# Patient Record
Sex: FEMALE | Race: OTHER | Employment: UNEMPLOYED
[De-identification: names, ages, dates, MRNs, and addresses within clinical notes are randomized per-mention and may not be internally consistent; named-entity substitution may affect disease eponyms.]

---

## 2017-01-30 ENCOUNTER — OFFICE VISIT (OUTPATIENT)
Dept: FAMILY MEDICINE CLINIC | Facility: CLINIC | Age: 37
End: 2017-01-30

## 2017-01-30 VITALS
HEIGHT: 67 IN | BODY MASS INDEX: 34.78 KG/M2 | WEIGHT: 221.6 LBS | SYSTOLIC BLOOD PRESSURE: 107 MMHG | HEART RATE: 65 BPM | DIASTOLIC BLOOD PRESSURE: 74 MMHG

## 2017-01-30 DIAGNOSIS — E66.9 OBESITY (BMI 30.0-34.9): Primary | ICD-10-CM

## 2017-01-30 DIAGNOSIS — H93.11 TINNITUS, RIGHT: ICD-10-CM

## 2017-01-30 DIAGNOSIS — D23.72 BENIGN NEOPLASM OF SKIN OF LEFT LOWER EXTREMITY: ICD-10-CM

## 2017-01-30 PROCEDURE — 99213 OFFICE O/P EST LOW 20 MIN: CPT | Performed by: FAMILY MEDICINE

## 2017-01-30 RX ORDER — PHENTERMINE HYDROCHLORIDE 37.5 MG/1
37.5 TABLET ORAL
Qty: 30 TABLET | Refills: 0 | Status: SHIPPED | OUTPATIENT
Start: 2017-01-30 | End: 2017-03-01

## 2017-02-23 ENCOUNTER — ANESTHESIA EVENT (OUTPATIENT)
Dept: OPERATING ROOM | Age: 37
End: 2017-02-23
Payer: MEDICARE

## 2017-02-24 ENCOUNTER — HOSPITAL ENCOUNTER (OUTPATIENT)
Age: 37
Setting detail: OUTPATIENT SURGERY
Discharge: HOME | End: 2017-02-24
Attending: SURGERY | Admitting: SURGERY
Payer: MEDICARE

## 2017-02-24 ENCOUNTER — SURGERY (OUTPATIENT)
Age: 37
End: 2017-02-24

## 2017-02-24 ENCOUNTER — ANESTHESIA (OUTPATIENT)
Dept: OPERATING ROOM | Age: 37
End: 2017-02-24
Payer: MEDICARE

## 2017-02-24 VITALS
WEIGHT: 220 LBS | TEMPERATURE: 98.1 F | RESPIRATION RATE: 16 BRPM | SYSTOLIC BLOOD PRESSURE: 107 MMHG | BODY MASS INDEX: 35.36 KG/M2 | DIASTOLIC BLOOD PRESSURE: 74 MMHG | OXYGEN SATURATION: 98 % | HEIGHT: 66 IN | HEART RATE: 63 BPM

## 2017-02-24 VITALS — SYSTOLIC BLOOD PRESSURE: 97 MMHG | DIASTOLIC BLOOD PRESSURE: 43 MMHG | OXYGEN SATURATION: 99 %

## 2017-02-24 LAB
ANION GAP SERPL CALCULATED.3IONS-SCNC: 16 MMOL/L (ref 9–17)
BUN BLDV-MCNC: 11 MG/DL (ref 6–20)
BUN/CREAT BLD: NORMAL (ref 9–20)
CALCIUM SERPL-MCNC: 8.7 MG/DL (ref 8.6–10.4)
CHLORIDE BLD-SCNC: 105 MMOL/L (ref 98–107)
CO2: 20 MMOL/L (ref 20–31)
CREAT SERPL-MCNC: 0.61 MG/DL (ref 0.5–0.9)
GFR AFRICAN AMERICAN: >60 ML/MIN
GFR NON-AFRICAN AMERICAN: >60 ML/MIN
GFR SERPL CREATININE-BSD FRML MDRD: NORMAL ML/MIN/{1.73_M2}
GFR SERPL CREATININE-BSD FRML MDRD: NORMAL ML/MIN/{1.73_M2}
GLUCOSE BLD-MCNC: 77 MG/DL (ref 70–99)
HCG, PREGNANCY URINE (POC): NEGATIVE
POTASSIUM SERPL-SCNC: 3.9 MMOL/L (ref 3.7–5.3)
SODIUM BLD-SCNC: 141 MMOL/L (ref 135–144)

## 2017-02-24 PROCEDURE — 84703 CHORIONIC GONADOTROPIN ASSAY: CPT

## 2017-02-24 PROCEDURE — 3600000002 HC SURGERY LEVEL 2 BASE: Performed by: SURGERY

## 2017-02-24 PROCEDURE — 2500000003 HC RX 250 WO HCPCS: Performed by: SPECIALIST

## 2017-02-24 PROCEDURE — 7100000010 HC PHASE II RECOVERY - FIRST 15 MIN: Performed by: SURGERY

## 2017-02-24 PROCEDURE — 2580000003 HC RX 258: Performed by: ANESTHESIOLOGY

## 2017-02-24 PROCEDURE — 7100000011 HC PHASE II RECOVERY - ADDTL 15 MIN: Performed by: SURGERY

## 2017-02-24 PROCEDURE — 88304 TISSUE EXAM BY PATHOLOGIST: CPT

## 2017-02-24 PROCEDURE — 6360000002 HC RX W HCPCS: Performed by: SURGERY

## 2017-02-24 PROCEDURE — 3700000000 HC ANESTHESIA ATTENDED CARE: Performed by: SURGERY

## 2017-02-24 PROCEDURE — 2500000003 HC RX 250 WO HCPCS: Performed by: SURGERY

## 2017-02-24 PROCEDURE — 80048 BASIC METABOLIC PNL TOTAL CA: CPT

## 2017-02-24 PROCEDURE — 3700000001 HC ADD 15 MINUTES (ANESTHESIA): Performed by: SURGERY

## 2017-02-24 PROCEDURE — 6370000000 HC RX 637 (ALT 250 FOR IP): Performed by: ANESTHESIOLOGY

## 2017-02-24 PROCEDURE — 6360000002 HC RX W HCPCS: Performed by: SPECIALIST

## 2017-02-24 PROCEDURE — 3600000012 HC SURGERY LEVEL 2 ADDTL 15MIN: Performed by: SURGERY

## 2017-02-24 RX ORDER — BUPIVACAINE HYDROCHLORIDE AND EPINEPHRINE 5; 5 MG/ML; UG/ML
INJECTION, SOLUTION PERINEURAL PRN
Status: DISCONTINUED | OUTPATIENT
Start: 2017-02-24 | End: 2017-02-24 | Stop reason: HOSPADM

## 2017-02-24 RX ORDER — OXYCODONE HYDROCHLORIDE AND ACETAMINOPHEN 5; 325 MG/1; MG/1
2 TABLET ORAL ONCE
Status: COMPLETED | OUTPATIENT
Start: 2017-02-24 | End: 2017-02-24

## 2017-02-24 RX ORDER — ONDANSETRON 2 MG/ML
4 INJECTION INTRAMUSCULAR; INTRAVENOUS
Status: DISCONTINUED | OUTPATIENT
Start: 2017-02-24 | End: 2017-02-24 | Stop reason: HOSPADM

## 2017-02-24 RX ORDER — MIDAZOLAM HYDROCHLORIDE 1 MG/ML
INJECTION INTRAMUSCULAR; INTRAVENOUS PRN
Status: DISCONTINUED | OUTPATIENT
Start: 2017-02-24 | End: 2017-02-24 | Stop reason: SDUPTHER

## 2017-02-24 RX ORDER — OXYCODONE HYDROCHLORIDE AND ACETAMINOPHEN 5; 325 MG/1; MG/1
1 TABLET ORAL EVERY 6 HOURS PRN
Qty: 20 TABLET | Refills: 0 | Status: SHIPPED | OUTPATIENT
Start: 2017-02-24 | End: 2017-03-03

## 2017-02-24 RX ORDER — OXYCODONE HYDROCHLORIDE AND ACETAMINOPHEN 5; 325 MG/1; MG/1
1 TABLET ORAL ONCE
Status: COMPLETED | OUTPATIENT
Start: 2017-02-24 | End: 2017-02-24

## 2017-02-24 RX ORDER — FENTANYL CITRATE 50 UG/ML
INJECTION, SOLUTION INTRAMUSCULAR; INTRAVENOUS PRN
Status: DISCONTINUED | OUTPATIENT
Start: 2017-02-24 | End: 2017-02-24 | Stop reason: SDUPTHER

## 2017-02-24 RX ORDER — DIPHENHYDRAMINE HYDROCHLORIDE 50 MG/ML
12.5 INJECTION INTRAMUSCULAR; INTRAVENOUS
Status: DISCONTINUED | OUTPATIENT
Start: 2017-02-24 | End: 2017-02-24 | Stop reason: HOSPADM

## 2017-02-24 RX ORDER — PROPOFOL 10 MG/ML
INJECTION, EMULSION INTRAVENOUS PRN
Status: DISCONTINUED | OUTPATIENT
Start: 2017-02-24 | End: 2017-02-24 | Stop reason: SDUPTHER

## 2017-02-24 RX ORDER — LIDOCAINE HYDROCHLORIDE 10 MG/ML
INJECTION, SOLUTION EPIDURAL; INFILTRATION; INTRACAUDAL; PERINEURAL PRN
Status: DISCONTINUED | OUTPATIENT
Start: 2017-02-24 | End: 2017-02-24 | Stop reason: SDUPTHER

## 2017-02-24 RX ORDER — PROMETHAZINE HYDROCHLORIDE 25 MG/ML
12.5 INJECTION, SOLUTION INTRAMUSCULAR; INTRAVENOUS
Status: DISCONTINUED | OUTPATIENT
Start: 2017-02-24 | End: 2017-02-24 | Stop reason: HOSPADM

## 2017-02-24 RX ORDER — SODIUM CHLORIDE, SODIUM LACTATE, POTASSIUM CHLORIDE, CALCIUM CHLORIDE 600; 310; 30; 20 MG/100ML; MG/100ML; MG/100ML; MG/100ML
INJECTION, SOLUTION INTRAVENOUS CONTINUOUS
Status: DISCONTINUED | OUTPATIENT
Start: 2017-02-24 | End: 2017-02-24 | Stop reason: HOSPADM

## 2017-02-24 RX ORDER — DOCUSATE SODIUM 100 MG/1
100 CAPSULE, LIQUID FILLED ORAL 2 TIMES DAILY
Qty: 60 CAPSULE | Refills: 0 | Status: SHIPPED | OUTPATIENT
Start: 2017-02-24 | End: 2017-03-15 | Stop reason: ALTCHOICE

## 2017-02-24 RX ORDER — LIDOCAINE HYDROCHLORIDE 10 MG/ML
1 INJECTION, SOLUTION EPIDURAL; INFILTRATION; INTRACAUDAL; PERINEURAL
Status: DISCONTINUED | OUTPATIENT
Start: 2017-02-24 | End: 2017-02-24 | Stop reason: HOSPADM

## 2017-02-24 RX ADMIN — PROPOFOL 100 MG: 10 INJECTION, EMULSION INTRAVENOUS at 14:35

## 2017-02-24 RX ADMIN — PROPOFOL 50 MG: 10 INJECTION, EMULSION INTRAVENOUS at 14:55

## 2017-02-24 RX ADMIN — PROPOFOL 100 MG: 10 INJECTION, EMULSION INTRAVENOUS at 14:40

## 2017-02-24 RX ADMIN — FENTANYL CITRATE 100 MCG: 50 INJECTION, SOLUTION INTRAMUSCULAR; INTRAVENOUS at 14:30

## 2017-02-24 RX ADMIN — MIDAZOLAM HYDROCHLORIDE 2 MG: 1 INJECTION, SOLUTION INTRAMUSCULAR; INTRAVENOUS at 14:30

## 2017-02-24 RX ADMIN — BUPIVACAINE HYDROCHLORIDE AND EPINEPHRINE BITARTRATE 10 ML: 5; .005 INJECTION, SOLUTION PERINEURAL at 15:02

## 2017-02-24 RX ADMIN — Medication 2 G: at 14:39

## 2017-02-24 RX ADMIN — LIDOCAINE HYDROCHLORIDE 50 MG: 10 INJECTION, SOLUTION EPIDURAL; INFILTRATION; INTRACAUDAL; PERINEURAL at 14:30

## 2017-02-24 RX ADMIN — PROPOFOL 100 MG: 10 INJECTION, EMULSION INTRAVENOUS at 14:45

## 2017-02-24 RX ADMIN — OXYCODONE HYDROCHLORIDE AND ACETAMINOPHEN 2 TABLET: 5; 325 TABLET ORAL at 15:55

## 2017-02-24 RX ADMIN — PROPOFOL 100 MG: 10 INJECTION, EMULSION INTRAVENOUS at 14:50

## 2017-02-24 RX ADMIN — SODIUM CHLORIDE, POTASSIUM CHLORIDE, SODIUM LACTATE AND CALCIUM CHLORIDE: 600; 310; 30; 20 INJECTION, SOLUTION INTRAVENOUS at 13:18

## 2017-02-24 ASSESSMENT — PAIN DESCRIPTION - LOCATION: LOCATION: LEG

## 2017-02-24 ASSESSMENT — PAIN SCALES - GENERAL
PAINLEVEL_OUTOF10: 7
PAINLEVEL_OUTOF10: 2
PAINLEVEL_OUTOF10: 3
PAINLEVEL_OUTOF10: 0
PAINLEVEL_OUTOF10: 3

## 2017-02-24 ASSESSMENT — PAIN - FUNCTIONAL ASSESSMENT: PAIN_FUNCTIONAL_ASSESSMENT: 0-10

## 2017-02-24 ASSESSMENT — PAIN DESCRIPTION - PAIN TYPE: TYPE: SURGICAL PAIN

## 2017-02-24 ASSESSMENT — ENCOUNTER SYMPTOMS: SHORTNESS OF BREATH: 1

## 2017-02-24 ASSESSMENT — PAIN DESCRIPTION - ORIENTATION: ORIENTATION: LEFT

## 2017-02-28 LAB — DERMATOLOGY PATHOLOGY REPORT: NORMAL

## 2017-03-13 ENCOUNTER — HOSPITAL ENCOUNTER (EMERGENCY)
Age: 37
Discharge: HOME | End: 2017-03-13
Attending: EMERGENCY MEDICINE
Payer: MEDICARE

## 2017-03-13 ENCOUNTER — APPOINTMENT (OUTPATIENT)
Dept: GENERAL RADIOLOGY | Age: 37
End: 2017-03-13
Payer: MEDICARE

## 2017-03-13 VITALS
DIASTOLIC BLOOD PRESSURE: 73 MMHG | TEMPERATURE: 99.9 F | HEART RATE: 89 BPM | BODY MASS INDEX: 35.51 KG/M2 | RESPIRATION RATE: 18 BRPM | SYSTOLIC BLOOD PRESSURE: 125 MMHG | OXYGEN SATURATION: 98 % | WEIGHT: 220 LBS

## 2017-03-13 DIAGNOSIS — S52.202A CLOSED FRACTURE OF SHAFT OF LEFT ULNA, UNSPECIFIED FRACTURE MORPHOLOGY, INITIAL ENCOUNTER: Primary | ICD-10-CM

## 2017-03-13 PROCEDURE — 99284 EMERGENCY DEPT VISIT MOD MDM: CPT

## 2017-03-13 PROCEDURE — 73090 X-RAY EXAM OF FOREARM: CPT

## 2017-03-13 PROCEDURE — 6360000002 HC RX W HCPCS: Performed by: INTERNAL MEDICINE

## 2017-03-13 PROCEDURE — 73590 X-RAY EXAM OF LOWER LEG: CPT

## 2017-03-13 PROCEDURE — 73070 X-RAY EXAM OF ELBOW: CPT

## 2017-03-13 PROCEDURE — 96374 THER/PROPH/DIAG INJ IV PUSH: CPT

## 2017-03-13 PROCEDURE — 96376 TX/PRO/DX INJ SAME DRUG ADON: CPT

## 2017-03-13 PROCEDURE — 73502 X-RAY EXAM HIP UNI 2-3 VIEWS: CPT

## 2017-03-13 PROCEDURE — 96375 TX/PRO/DX INJ NEW DRUG ADDON: CPT

## 2017-03-13 PROCEDURE — 29125 APPL SHORT ARM SPLINT STATIC: CPT

## 2017-03-13 PROCEDURE — 72072 X-RAY EXAM THORAC SPINE 3VWS: CPT

## 2017-03-13 PROCEDURE — 73110 X-RAY EXAM OF WRIST: CPT

## 2017-03-13 PROCEDURE — 73552 X-RAY EXAM OF FEMUR 2/>: CPT

## 2017-03-13 RX ORDER — MORPHINE SULFATE 4 MG/ML
4 INJECTION, SOLUTION INTRAMUSCULAR; INTRAVENOUS
Status: DISCONTINUED | OUTPATIENT
Start: 2017-03-13 | End: 2017-03-13

## 2017-03-13 RX ORDER — OXYCODONE HYDROCHLORIDE AND ACETAMINOPHEN 5; 325 MG/1; MG/1
1 TABLET ORAL EVERY 6 HOURS PRN
Qty: 20 TABLET | Refills: 0 | Status: SHIPPED | OUTPATIENT
Start: 2017-03-13 | End: 2017-03-18

## 2017-03-13 RX ORDER — KETOROLAC TROMETHAMINE 15 MG/ML
30 INJECTION, SOLUTION INTRAMUSCULAR; INTRAVENOUS ONCE
Status: COMPLETED | OUTPATIENT
Start: 2017-03-13 | End: 2017-03-13

## 2017-03-13 RX ADMIN — MORPHINE SULFATE 4 MG: 4 INJECTION, SOLUTION INTRAMUSCULAR; INTRAVENOUS at 18:46

## 2017-03-13 RX ADMIN — HYDROMORPHONE HYDROCHLORIDE 0.5 MG: 1 INJECTION, SOLUTION INTRAMUSCULAR; INTRAVENOUS; SUBCUTANEOUS at 21:43

## 2017-03-13 RX ADMIN — HYDROMORPHONE HYDROCHLORIDE 1 MG: 1 INJECTION, SOLUTION INTRAMUSCULAR; INTRAVENOUS; SUBCUTANEOUS at 20:41

## 2017-03-13 RX ADMIN — KETOROLAC TROMETHAMINE 30 MG: 15 INJECTION, SOLUTION INTRAMUSCULAR; INTRAVENOUS at 22:47

## 2017-03-13 ASSESSMENT — PAIN SCALES - GENERAL
PAINLEVEL_OUTOF10: 10
PAINLEVEL_OUTOF10: 10
PAINLEVEL_OUTOF10: 9
PAINLEVEL_OUTOF10: 10
PAINLEVEL_OUTOF10: 9

## 2017-03-13 ASSESSMENT — ENCOUNTER SYMPTOMS
EYE DISCHARGE: 0
SHORTNESS OF BREATH: 0
WHEEZING: 0
ANAL BLEEDING: 0
BACK PAIN: 1
ABDOMINAL DISTENTION: 0
CHEST TIGHTNESS: 0
EYE PAIN: 0

## 2017-03-13 ASSESSMENT — PAIN DESCRIPTION - ORIENTATION: ORIENTATION: LEFT

## 2017-03-13 ASSESSMENT — PAIN DESCRIPTION - DESCRIPTORS: DESCRIPTORS: SHARP

## 2017-03-13 ASSESSMENT — PAIN DESCRIPTION - LOCATION: LOCATION: ARM

## 2017-03-13 ASSESSMENT — PAIN SCALES - WONG BAKER: WONGBAKER_NUMERICALRESPONSE: 10

## 2017-03-13 ASSESSMENT — PAIN DESCRIPTION - PAIN TYPE: TYPE: ACUTE PAIN

## 2017-03-15 ENCOUNTER — HOSPITAL ENCOUNTER (OUTPATIENT)
Age: 37
Setting detail: SPECIMEN
Discharge: HOME | End: 2017-03-15
Payer: MEDICARE

## 2017-03-15 ENCOUNTER — OFFICE VISIT (OUTPATIENT)
Dept: FAMILY MEDICINE CLINIC | Age: 37
End: 2017-03-15
Payer: MEDICARE

## 2017-03-15 VITALS
SYSTOLIC BLOOD PRESSURE: 107 MMHG | HEIGHT: 66 IN | DIASTOLIC BLOOD PRESSURE: 74 MMHG | OXYGEN SATURATION: 99 % | TEMPERATURE: 97 F | HEART RATE: 64 BPM | RESPIRATION RATE: 12 BRPM

## 2017-03-15 DIAGNOSIS — R30.0 DYSURIA: ICD-10-CM
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
## 2017-03-15 LAB
BILIRUBIN URINE: NEGATIVE
BILIRUBIN, POC: NEGATIVE
BLOOD URINE, POC: NEGATIVE
CLARITY, POC: CLEAR
COLOR, POC: YELLOW
COLOR: YELLOW
COMMENT UA: NORMAL
GLUCOSE URINE, POC: NEGATIVE
GLUCOSE URINE: NEGATIVE
KETONES, POC: NEGATIVE
KETONES, URINE: NEGATIVE
LEUKOCYTE EST, POC: ABNORMAL
LEUKOCYTE ESTERASE, URINE: NEGATIVE
NITRITE, POC: NEGATIVE
NITRITE, URINE: NEGATIVE
PH UA: 6 (ref 5–8)
PH, POC: 5
PROTEIN UA: NEGATIVE
PROTEIN, POC: ABNORMAL
SPECIFIC GRAVITY UA: 1.02 (ref 1–1.03)
SPECIFIC GRAVITY, POC: 1.02
TURBIDITY: CLEAR
URINE HGB: NEGATIVE
UROBILINOGEN, POC: NEGATIVE
UROBILINOGEN, URINE: NORMAL

## 2017-03-15 PROCEDURE — 99214 OFFICE O/P EST MOD 30 MIN: CPT | Performed by: FAMILY MEDICINE

## 2017-03-15 PROCEDURE — 81002 URINALYSIS NONAUTO W/O SCOPE: CPT | Performed by: FAMILY MEDICINE

## 2017-03-15 RX ORDER — NAPROXEN 500 MG/1
500 TABLET ORAL 2 TIMES DAILY WITH MEALS
Qty: 60 TABLET | Refills: 3 | Status: SHIPPED | OUTPATIENT
Start: 2017-03-15 | End: 2017-10-16 | Stop reason: ALTCHOICE

## 2017-03-15 RX ORDER — CRUTCH
1 EACH MISCELLANEOUS DAILY PRN
Qty: 1 EACH | Refills: 0 | Status: SHIPPED | OUTPATIENT
Start: 2017-03-15 | End: 2017-04-24 | Stop reason: ALTCHOICE

## 2017-03-15 RX ORDER — TIZANIDINE 4 MG/1
4 TABLET ORAL 3 TIMES DAILY
Qty: 90 TABLET | Refills: 0 | Status: SHIPPED | OUTPATIENT
Start: 2017-03-15 | End: 2017-04-24 | Stop reason: ALTCHOICE

## 2017-03-16 ENCOUNTER — TELEPHONE (OUTPATIENT)
Dept: FAMILY MEDICINE CLINIC | Age: 37
End: 2017-03-16

## 2017-03-16 LAB
CULTURE: NORMAL
CULTURE: NORMAL
Lab: NORMAL
SPECIMEN DESCRIPTION: NORMAL
STATUS: NORMAL

## 2017-03-16 RX ORDER — HYDROCODONE BITARTRATE AND ACETAMINOPHEN 7.5; 325 MG/1; MG/1
1 TABLET ORAL 2 TIMES DAILY
Qty: 30 TABLET | Refills: 0 | Status: SHIPPED | OUTPATIENT
Start: 2017-03-16 | End: 2017-03-31

## 2017-03-22 ENCOUNTER — OFFICE VISIT (OUTPATIENT)
Dept: FAMILY MEDICINE CLINIC | Age: 37
End: 2017-03-22
Payer: MEDICARE

## 2017-03-22 ENCOUNTER — OFFICE VISIT (OUTPATIENT)
Dept: ORTHOPEDIC SURGERY | Age: 37
End: 2017-03-22
Payer: MEDICARE

## 2017-03-22 VITALS — HEIGHT: 66 IN | BODY MASS INDEX: 36.48 KG/M2 | WEIGHT: 227 LBS

## 2017-03-22 VITALS
RESPIRATION RATE: 12 BRPM | HEIGHT: 66 IN | BODY MASS INDEX: 36.48 KG/M2 | HEART RATE: 79 BPM | DIASTOLIC BLOOD PRESSURE: 80 MMHG | OXYGEN SATURATION: 97 % | SYSTOLIC BLOOD PRESSURE: 112 MMHG | WEIGHT: 227 LBS

## 2017-03-22 DIAGNOSIS — F43.29 STRESS AND ADJUSTMENT REACTION: Primary | ICD-10-CM

## 2017-03-22 DIAGNOSIS — S52.235D CLOSED NONDISPLACED OBLIQUE FRACTURE OF SHAFT OF LEFT ULNA WITH ROUTINE HEALING, SUBSEQUENT ENCOUNTER: Primary | ICD-10-CM

## 2017-03-22 PROBLEM — S52.235A CLOSED NONDISPLACED OBLIQUE FRACTURE OF SHAFT OF LEFT ULNA: Status: ACTIVE | Noted: 2017-03-22

## 2017-03-22 PROCEDURE — 99213 OFFICE O/P EST LOW 20 MIN: CPT | Performed by: STUDENT IN AN ORGANIZED HEALTH CARE EDUCATION/TRAINING PROGRAM

## 2017-03-22 PROCEDURE — 99214 OFFICE O/P EST MOD 30 MIN: CPT | Performed by: FAMILY MEDICINE

## 2017-03-22 RX ORDER — CLONAZEPAM 0.5 MG/1
0.5 TABLET ORAL 2 TIMES DAILY
Qty: 28 TABLET | Refills: 0 | Status: SHIPPED | OUTPATIENT
Start: 2017-03-22 | End: 2017-10-16 | Stop reason: ALTCHOICE

## 2017-04-03 ENCOUNTER — OFFICE VISIT (OUTPATIENT)
Dept: ORTHOPEDIC SURGERY | Age: 37
End: 2017-04-03
Payer: MEDICARE

## 2017-04-03 VITALS — HEIGHT: 66 IN | WEIGHT: 227.07 LBS | BODY MASS INDEX: 36.49 KG/M2

## 2017-04-03 DIAGNOSIS — S52.235D CLOSED NONDISPLACED OBLIQUE FRACTURE OF SHAFT OF LEFT ULNA WITH ROUTINE HEALING, SUBSEQUENT ENCOUNTER: Primary | ICD-10-CM

## 2017-04-03 DIAGNOSIS — S52.235A CLOSED NONDISPLACED OBLIQUE FRACTURE OF SHAFT OF LEFT ULNA, INITIAL ENCOUNTER: Primary | ICD-10-CM

## 2017-04-03 PROBLEM — S52.236D: Status: ACTIVE | Noted: 2017-04-03

## 2017-04-03 PROCEDURE — 99213 OFFICE O/P EST LOW 20 MIN: CPT | Performed by: ORTHOPAEDIC SURGERY

## 2017-04-03 ASSESSMENT — ENCOUNTER SYMPTOMS
COUGH: 0
NAUSEA: 0
EYE PAIN: 0
VOMITING: 0
WHEEZING: 0
ABDOMINAL DISTENTION: 0
ABDOMINAL PAIN: 0
COLOR CHANGE: 0
SHORTNESS OF BREATH: 0

## 2017-04-24 ENCOUNTER — OFFICE VISIT (OUTPATIENT)
Dept: ORTHOPEDIC SURGERY | Age: 37
End: 2017-04-24
Payer: MEDICARE

## 2017-04-24 VITALS — HEIGHT: 66 IN | BODY MASS INDEX: 33.75 KG/M2 | WEIGHT: 210 LBS

## 2017-04-24 DIAGNOSIS — S52.235A CLOSED NONDISPLACED OBLIQUE FRACTURE OF SHAFT OF LEFT ULNA, INITIAL ENCOUNTER: Primary | ICD-10-CM

## 2017-04-24 PROCEDURE — 99213 OFFICE O/P EST LOW 20 MIN: CPT | Performed by: STUDENT IN AN ORGANIZED HEALTH CARE EDUCATION/TRAINING PROGRAM

## 2017-04-24 RX ORDER — PHENTERMINE HYDROCHLORIDE 37.5 MG/1
37.5 TABLET ORAL DAILY
COMMUNITY
Start: 2017-02-03 | End: 2017-10-16 | Stop reason: ALTCHOICE

## 2017-04-24 ASSESSMENT — ENCOUNTER SYMPTOMS
SHORTNESS OF BREATH: 0
ABDOMINAL DISTENTION: 0
WHEEZING: 0

## 2017-04-27 ENCOUNTER — OFFICE VISIT (OUTPATIENT)
Dept: FAMILY MEDICINE CLINIC | Age: 37
End: 2017-04-27
Payer: MEDICARE

## 2017-04-27 VITALS
HEART RATE: 82 BPM | TEMPERATURE: 97.8 F | WEIGHT: 220 LBS | SYSTOLIC BLOOD PRESSURE: 130 MMHG | BODY MASS INDEX: 34.53 KG/M2 | HEIGHT: 67 IN | DIASTOLIC BLOOD PRESSURE: 84 MMHG | OXYGEN SATURATION: 97 %

## 2017-04-27 DIAGNOSIS — E66.9 OBESITY (BMI 30.0-34.9): Primary | ICD-10-CM

## 2017-04-27 PROCEDURE — 99213 OFFICE O/P EST LOW 20 MIN: CPT | Performed by: FAMILY MEDICINE

## 2017-04-27 RX ORDER — DOCUSATE SODIUM 100 MG/1
CAPSULE ORAL
COMMUNITY
Start: 2017-02-24 | End: 2017-07-10 | Stop reason: ALTCHOICE

## 2017-04-27 RX ORDER — OXYCODONE HYDROCHLORIDE AND ACETAMINOPHEN 5; 325 MG/1; MG/1
TABLET ORAL
COMMUNITY
Start: 2017-03-14 | End: 2017-05-22 | Stop reason: ALTCHOICE

## 2017-04-27 RX ORDER — HYDROCODONE BITARTRATE AND ACETAMINOPHEN 7.5; 325 MG/1; MG/1
TABLET ORAL
COMMUNITY
Start: 2017-03-16 | End: 2017-05-22 | Stop reason: ALTCHOICE

## 2017-05-18 DIAGNOSIS — S52.235A CLOSED NONDISPLACED OBLIQUE FRACTURE OF SHAFT OF LEFT ULNA, INITIAL ENCOUNTER: Primary | ICD-10-CM

## 2017-05-22 ENCOUNTER — OFFICE VISIT (OUTPATIENT)
Dept: ORTHOPEDIC SURGERY | Age: 37
End: 2017-05-22
Payer: MEDICARE

## 2017-05-22 VITALS — WEIGHT: 218.7 LBS | BODY MASS INDEX: 34.33 KG/M2 | HEIGHT: 67 IN

## 2017-05-22 DIAGNOSIS — S52.235D CLOSED NONDISPLACED OBLIQUE FRACTURE OF SHAFT OF LEFT ULNA WITH ROUTINE HEALING, SUBSEQUENT ENCOUNTER: Primary | ICD-10-CM

## 2017-05-22 PROCEDURE — 99213 OFFICE O/P EST LOW 20 MIN: CPT | Performed by: ORTHOPAEDIC SURGERY

## 2017-05-22 ASSESSMENT — ENCOUNTER SYMPTOMS
VOMITING: 0
NAUSEA: 0
SHORTNESS OF BREATH: 0

## 2017-06-01 ENCOUNTER — HOSPITAL ENCOUNTER (OUTPATIENT)
Dept: PHYSICAL THERAPY | Age: 37
Setting detail: THERAPIES SERIES
Discharge: HOME | End: 2017-06-01
Payer: MEDICARE

## 2017-06-07 ENCOUNTER — HOSPITAL ENCOUNTER (OUTPATIENT)
Dept: PHYSICAL THERAPY | Age: 37
Setting detail: THERAPIES SERIES
Discharge: HOME | End: 2017-06-07
Payer: MEDICARE

## 2017-06-07 PROCEDURE — 97110 THERAPEUTIC EXERCISES: CPT

## 2017-06-07 PROCEDURE — 97162 PT EVAL MOD COMPLEX 30 MIN: CPT

## 2017-06-08 ENCOUNTER — HOSPITAL ENCOUNTER (OUTPATIENT)
Dept: PHYSICAL THERAPY | Age: 37
Setting detail: THERAPIES SERIES
Discharge: HOME | End: 2017-06-08
Payer: MEDICARE

## 2017-06-08 PROCEDURE — 97140 MANUAL THERAPY 1/> REGIONS: CPT

## 2017-06-08 PROCEDURE — 97110 THERAPEUTIC EXERCISES: CPT

## 2017-06-13 ENCOUNTER — HOSPITAL ENCOUNTER (OUTPATIENT)
Dept: PHYSICAL THERAPY | Age: 37
Setting detail: THERAPIES SERIES
Discharge: HOME | End: 2017-06-13
Payer: MEDICARE

## 2017-06-15 ENCOUNTER — HOSPITAL ENCOUNTER (OUTPATIENT)
Dept: PHYSICAL THERAPY | Age: 37
Setting detail: THERAPIES SERIES
Discharge: HOME | End: 2017-06-15
Payer: MEDICARE

## 2017-06-27 ENCOUNTER — HOSPITAL ENCOUNTER (OUTPATIENT)
Dept: PHYSICAL THERAPY | Age: 37
Setting detail: THERAPIES SERIES
Discharge: HOME | End: 2017-06-27
Payer: MEDICARE

## 2017-06-27 PROCEDURE — 97110 THERAPEUTIC EXERCISES: CPT

## 2017-07-10 ENCOUNTER — OFFICE VISIT (OUTPATIENT)
Dept: ORTHOPEDIC SURGERY | Age: 37
End: 2017-07-10
Payer: MEDICARE

## 2017-07-10 VITALS — BODY MASS INDEX: 34.33 KG/M2 | HEIGHT: 67 IN | WEIGHT: 218.7 LBS

## 2017-07-10 DIAGNOSIS — M25.532 WRIST PAIN, CHRONIC, LEFT: ICD-10-CM

## 2017-07-10 DIAGNOSIS — G89.29 WRIST PAIN, CHRONIC, LEFT: ICD-10-CM

## 2017-07-10 DIAGNOSIS — S80.11XA CONTUSION OF RIGHT TIBIA: ICD-10-CM

## 2017-07-10 DIAGNOSIS — S52.235D CLOSED NONDISPLACED OBLIQUE FRACTURE OF SHAFT OF LEFT ULNA WITH ROUTINE HEALING, SUBSEQUENT ENCOUNTER: Primary | ICD-10-CM

## 2017-07-10 PROCEDURE — 99214 OFFICE O/P EST MOD 30 MIN: CPT | Performed by: ORTHOPAEDIC SURGERY

## 2017-07-10 RX ORDER — NAPROXEN 500 MG/1
500 TABLET ORAL 2 TIMES DAILY WITH MEALS
Qty: 28 TABLET | Refills: 0
Start: 2017-07-10 | End: 2017-10-16 | Stop reason: ALTCHOICE

## 2017-07-21 ENCOUNTER — HOSPITAL ENCOUNTER (OUTPATIENT)
Dept: INTERVENTIONAL RADIOLOGY/VASCULAR | Age: 37
Discharge: HOME | End: 2017-07-21
Payer: MEDICARE

## 2017-07-21 ENCOUNTER — HOSPITAL ENCOUNTER (OUTPATIENT)
Dept: MRI IMAGING | Age: 37
Discharge: HOME | End: 2017-07-21
Payer: MEDICARE

## 2017-07-21 DIAGNOSIS — G89.29 WRIST PAIN, CHRONIC, LEFT: ICD-10-CM

## 2017-07-21 DIAGNOSIS — S52.235D CLOSED NONDISPLACED OBLIQUE FRACTURE OF SHAFT OF LEFT ULNA WITH ROUTINE HEALING, SUBSEQUENT ENCOUNTER: ICD-10-CM

## 2017-07-21 DIAGNOSIS — M25.532 WRIST PAIN, CHRONIC, LEFT: ICD-10-CM

## 2017-07-21 PROCEDURE — 77002 NEEDLE LOCALIZATION BY XRAY: CPT | Performed by: RADIOLOGY

## 2017-07-21 PROCEDURE — 6360000004 HC RX CONTRAST MEDICATION: Performed by: RADIOLOGY

## 2017-07-21 PROCEDURE — 6360000004 HC RX CONTRAST MEDICATION: Performed by: ORTHOPAEDIC SURGERY

## 2017-07-21 PROCEDURE — A9579 GAD-BASE MR CONTRAST NOS,1ML: HCPCS | Performed by: ORTHOPAEDIC SURGERY

## 2017-07-21 PROCEDURE — 73222 MRI JOINT UPR EXTREM W/DYE: CPT

## 2017-07-21 PROCEDURE — 25246 INJECTION FOR WRIST X-RAY: CPT | Performed by: RADIOLOGY

## 2017-07-21 RX ADMIN — IOTHALAMATE MEGLUMINE 14 ML: 430 INJECTION INTRAVASCULAR at 15:04

## 2017-07-21 RX ADMIN — GADOPENTETATE DIMEGLUMINE 1 ML: 469.01 INJECTION INTRAVENOUS at 15:55

## 2017-07-28 ENCOUNTER — HOSPITAL ENCOUNTER (OUTPATIENT)
Dept: PHYSICAL THERAPY | Age: 37
Setting detail: THERAPIES SERIES
Discharge: HOME | End: 2017-07-28
Payer: MEDICARE

## 2017-07-28 PROCEDURE — 97110 THERAPEUTIC EXERCISES: CPT

## 2017-07-28 PROCEDURE — 97164 PT RE-EVAL EST PLAN CARE: CPT

## 2017-08-02 ENCOUNTER — HOSPITAL ENCOUNTER (OUTPATIENT)
Dept: PHYSICAL THERAPY | Age: 37
Setting detail: THERAPIES SERIES
Discharge: HOME | End: 2017-08-02
Payer: MEDICARE

## 2017-08-02 PROCEDURE — 97110 THERAPEUTIC EXERCISES: CPT

## 2017-08-02 PROCEDURE — 97140 MANUAL THERAPY 1/> REGIONS: CPT

## 2017-08-04 ENCOUNTER — HOSPITAL ENCOUNTER (OUTPATIENT)
Dept: PHYSICAL THERAPY | Age: 37
Setting detail: THERAPIES SERIES
Discharge: HOME | End: 2017-08-04
Payer: MEDICARE

## 2017-10-16 ENCOUNTER — OFFICE VISIT (OUTPATIENT)
Dept: FAMILY MEDICINE CLINIC | Age: 37
End: 2017-10-16
Payer: MEDICARE

## 2017-10-16 ENCOUNTER — HOSPITAL ENCOUNTER (OUTPATIENT)
Age: 37
Setting detail: SPECIMEN
Discharge: HOME | End: 2017-10-16
Payer: MEDICARE

## 2017-10-16 VITALS
SYSTOLIC BLOOD PRESSURE: 114 MMHG | OXYGEN SATURATION: 98 % | WEIGHT: 220 LBS | BODY MASS INDEX: 34.53 KG/M2 | HEART RATE: 76 BPM | DIASTOLIC BLOOD PRESSURE: 76 MMHG | RESPIRATION RATE: 20 BRPM | HEIGHT: 67 IN

## 2017-10-16 DIAGNOSIS — E66.09 CLASS 1 OBESITY DUE TO EXCESS CALORIES WITHOUT SERIOUS COMORBIDITY WITH BODY MASS INDEX (BMI) OF 34.0 TO 34.9 IN ADULT: ICD-10-CM

## 2017-10-16 DIAGNOSIS — E66.09 CLASS 1 OBESITY DUE TO EXCESS CALORIES WITHOUT SERIOUS COMORBIDITY WITH BODY MASS INDEX (BMI) OF 34.0 TO 34.9 IN ADULT: Primary | ICD-10-CM

## 2017-10-16 LAB
ALBUMIN SERPL-MCNC: 3.8 G/DL (ref 3.5–5.2)
ALBUMIN/GLOBULIN RATIO: 1.1 (ref 1–2.5)
ALP BLD-CCNC: 58 U/L (ref 35–104)
ALT SERPL-CCNC: 19 U/L (ref 5–33)
ANION GAP SERPL CALCULATED.3IONS-SCNC: 14 MMOL/L (ref 9–17)
AST SERPL-CCNC: 16 U/L
BILIRUB SERPL-MCNC: <0.1 MG/DL (ref 0.3–1.2)
BUN BLDV-MCNC: 10 MG/DL (ref 6–20)
BUN/CREAT BLD: ABNORMAL (ref 9–20)
CALCIUM SERPL-MCNC: 9.1 MG/DL (ref 8.6–10.4)
CHLORIDE BLD-SCNC: 103 MMOL/L (ref 98–107)
CO2: 25 MMOL/L (ref 20–31)
CREAT SERPL-MCNC: 0.6 MG/DL (ref 0.5–0.9)
GFR AFRICAN AMERICAN: >60 ML/MIN
GFR NON-AFRICAN AMERICAN: >60 ML/MIN
GFR SERPL CREATININE-BSD FRML MDRD: ABNORMAL ML/MIN/{1.73_M2}
GFR SERPL CREATININE-BSD FRML MDRD: ABNORMAL ML/MIN/{1.73_M2}
GLUCOSE BLD-MCNC: 92 MG/DL (ref 70–99)
POTASSIUM SERPL-SCNC: 4.5 MMOL/L (ref 3.7–5.3)
SODIUM BLD-SCNC: 142 MMOL/L (ref 135–144)
THYROXINE, FREE: 0.91 NG/DL (ref 0.93–1.7)
TOTAL PROTEIN: 7.2 G/DL (ref 6.4–8.3)
TSH SERPL DL<=0.05 MIU/L-ACNC: 0.98 MIU/L (ref 0.3–5)

## 2017-10-16 PROCEDURE — 99213 OFFICE O/P EST LOW 20 MIN: CPT | Performed by: FAMILY MEDICINE

## 2017-10-16 RX ORDER — PHENTERMINE HYDROCHLORIDE 37.5 MG/1
37.5 TABLET ORAL
Qty: 30 TABLET | Refills: 0 | Status: SHIPPED | OUTPATIENT
Start: 2017-10-16 | End: 2017-11-15

## 2017-10-16 ASSESSMENT — PATIENT HEALTH QUESTIONNAIRE - PHQ9
1. LITTLE INTEREST OR PLEASURE IN DOING THINGS: 0
SUM OF ALL RESPONSES TO PHQ9 QUESTIONS 1 & 2: 0
SUM OF ALL RESPONSES TO PHQ QUESTIONS 1-9: 0
2. FEELING DOWN, DEPRESSED OR HOPELESS: 0

## 2017-10-16 NOTE — PROGRESS NOTES
Visit Information    Have you changed or started any medications since your last visit including any over-the-counter medicines, vitamins, or herbal medicines? no   Have you stopped taking any of your medications? Is so, why? -  no  Are you having any side effects from any of your medications? - no    Have you seen any other physician or provider since your last visit? yes - Orthopedic Surgeon - with [facility], unsure of name   Have you had any other diagnostic tests since your last visit? yes - MRI and xray with [facility]   Have you been seen in the emergency room and/or had an admission in a hospital since we last saw you?  no   Have you had your routine dental cleaning in the past 6 months?  no     Do you have an active MyChart account? If no, what is the barrier?   No: declined    Patient Care Team:  Jalil Kruse MD as PCP - General (Family Medicine)    Medical History Review  Past Medical, Family, and Social History reviewed and does not contribute to the patient presenting condition    Health Maintenance   Topic Date Due    Flu vaccine (1) 09/01/2017    Cervical cancer screen  11/10/2018    DTaP/Tdap/Td vaccine (2 - Td) 08/13/2019    HIV screen  Completed
Thyroid Peroxidase Antibody       Plan:   Patient will start Adipex again. Additional blood work ordered. Patient will continue current diet and start exercise regimen. I discussed with her to exercise at least 30 minutes 5 times a week. Decrease carbohydrate intake. Increase fibers and protein. See me back in 4 weeks for weight check. Call if any changes. Stop Adipex if you have any side effects. Call or return to clinic prn if these symptoms worsen or fail to improve as anticipated. I have reviewed the instructions with the patient, answering all questions to her satisfaction. Return in about 4 weeks (around 11/13/2017), or if symptoms worsen or fail to improve, for weight.   Orders Placed This Encounter   Procedures    Comprehensive Metabolic Panel     Standing Status:   Future     Number of Occurrences:   1     Standing Expiration Date:   10/16/2018    T4, Free     Standing Status:   Future     Number of Occurrences:   1     Standing Expiration Date:   10/16/2018    TSH without Reflex     Standing Status:   Future     Number of Occurrences:   1     Standing Expiration Date:   10/16/2018    Thyroid Peroxidase Antibody     Standing Status:   Future     Number of Occurrences:   1     Standing Expiration Date:   10/16/2018     Orders Placed This Encounter   Medications    phentermine (ADIPEX-P) 37.5 MG tablet     Sig: Take 1 tablet by mouth every morning (before breakfast)     Dispense:  30 tablet     Refill:  0       Electronically signed by Aminata Farfan MD on 10/17/2017 at 5:25 AM       (Please note that portions of this note were completed with a voice recognition program. Efforts were made to edit the dictations but occasionally words are mis-transcribed.)

## 2017-10-18 LAB — THYROID PEROXIDASE (TPO) AB: <10 IU/ML (ref 0–35)

## 2017-11-22 ENCOUNTER — OFFICE VISIT (OUTPATIENT)
Dept: FAMILY MEDICINE CLINIC | Age: 37
End: 2017-11-22
Payer: MEDICARE

## 2017-11-22 VITALS
WEIGHT: 219 LBS | OXYGEN SATURATION: 97 % | HEART RATE: 80 BPM | DIASTOLIC BLOOD PRESSURE: 83 MMHG | SYSTOLIC BLOOD PRESSURE: 122 MMHG | RESPIRATION RATE: 16 BRPM | HEIGHT: 67 IN | BODY MASS INDEX: 34.37 KG/M2

## 2017-11-22 DIAGNOSIS — E66.9 OBESITY (BMI 30.0-34.9): Primary | ICD-10-CM

## 2017-11-22 PROCEDURE — 99213 OFFICE O/P EST LOW 20 MIN: CPT | Performed by: FAMILY MEDICINE

## 2017-11-22 PROCEDURE — G8417 CALC BMI ABV UP PARAM F/U: HCPCS | Performed by: FAMILY MEDICINE

## 2017-11-22 PROCEDURE — G8484 FLU IMMUNIZE NO ADMIN: HCPCS | Performed by: FAMILY MEDICINE

## 2017-11-22 PROCEDURE — 1036F TOBACCO NON-USER: CPT | Performed by: FAMILY MEDICINE

## 2017-11-22 PROCEDURE — G8427 DOCREV CUR MEDS BY ELIG CLIN: HCPCS | Performed by: FAMILY MEDICINE

## 2017-11-22 RX ORDER — PHENTERMINE HYDROCHLORIDE 37.5 MG/1
37.5 TABLET ORAL
Qty: 30 TABLET | Refills: 0 | Status: SHIPPED | OUTPATIENT
Start: 2017-11-22 | End: 2017-12-12 | Stop reason: SDUPTHER

## 2017-11-22 NOTE — PROGRESS NOTES
General FM note    Marci Dandy is a 39 y.o. female who presents today for follow up on her  medical conditions as noted below. Marci Dandy is c/o of   Chief Complaint   Patient presents with    Weight Management     needs medication refill on adipex       Patient Active Problem List:     Suicide attempt     Bipolar 1 disorder ([address]     Postnasal drip     Marijuana abuse     Obesity (BMI 30.0-34. 9)     Closed nondisplaced oblique fracture of shaft of left ulna     Closed nondisplaced oblique fracture of shaft of ulna with routine healing     Past Medical History:   Diagnosis Date    ADD (attention deficit disorder)     ADHD (attention deficit hyperactivity disorder)     Bipolar 1 disorder (HCC)     Bronchitis     Constipation     Depression     Diarrhea     Difficulty swallowing     Dizziness     Fibromyalgia     Fibromyalgia     Headache     HLD (hyperlipidemia)     IBS (irritable bowel syndrome)     Nausea & vomiting     Nervously anxious     SOB (shortness of breath)     Thyroid disorder     Wears glasses     Weight gain       Past Surgical History:   Procedure Laterality Date    DILATION AND CURETTAGE OF UTERUS      EXCISION / BIOPSY SKIN LESION OF LEG Left 2/24/2017    EXCISION MEDIAL THIGH LESION performed by Behzad Ovalles IV, DO at [address] [place] [address] Avenue      2 cone biopsys    PRE-MALIGNANT / BENIGN SKIN LESION EXCISION Left 02/24/2017    inner thigh     TONSILLECTOMY      adenoids     Family History   Problem Relation Age of Onset    Hypertension Mother     Diabetes Father     Brain Cancer Maternal Grandmother     Heart Defect Maternal Grandfather      Current Outpatient Prescriptions   Medication Sig Dispense Refill    phentermine (ADIPEX-P) 37.5 MG tablet Take 1 tablet by mouth every morning (before breakfast) . 30 tablet 0     No current facility-administered medications for this visit.       ALLERGIES:    Allergies   Allergen Reactions    Sulfamethoxazole-Trimethoprim      dont remember RX       Social History   Substance Use Topics    Smoking status: Never Smoker    Smokeless tobacco: Never Used    Alcohol use 0.0 oz/week      Body mass index is 34.37 kg/m². /83   Pulse 80   Resp 16   Ht 5' 6.93\" (1.7 m)   Wt 219 lb (99.3 kg)   LMP 11/05/2017   SpO2 97%   BMI 34.37 kg/m²     Subjective:      HPI  39 at female coming today for recheck. She just lost 1 pound over last 4 weeks. She states she was unable to take the medication daily because of increased back pain. She states that she did not change her diet. She tries not to drink any pop, but otherwise did not make any changes in her daily life. Did not start exercising. Denies any SE. Review of Systems   Constitutional: Negative for fever and unexpected weight change. Respiratory: Negative for cough and shortness of breath. Cardiovascular: Negative for chest pain and leg swelling. Gastrointestinal: Negative for diarrhea, constipation and blood in stool. Skin: Negative for color change and rash. Objective:   Physical Exam  Constitutional: VS (see above). General appearance: normal development, habitus and attention, no deformities. Eyes: normal conjunctiva and lids. CAV: RRR, no RMG. No edema lower extremities. Pulmo: CTA bilateral, no CWR. Skin: no rashes, lesions or ulcers. Musculoskeletal: normal gait. Nails: no clubbing or cyanosis. Psychiatric: alert and oriented to place, time and person. Normal mood and affect. Assessment:      1. Obesity (BMI 30.0-34. 9)         Plan:   Sec script provided. Patient will continue current diet and exercise regimen. I discussed with her to exercise at least 30 minutes 5 times a week. Decrease carbohydrate intake. Increase fibers and protein. See me back in 4 weeks for weight check. Call if any changes. Stop Adipex if you have any side effects.   Call or return to clinic prn if these symptoms worsen or fail to improve as anticipated. I have reviewed the instructions with the patient, answering all questions to her satisfaction. Return in about 4 weeks (around 12/20/2017), or if symptoms worsen or fail to improve, for weight. No orders of the defined types were placed in this encounter. Orders Placed This Encounter   Medications    phentermine (ADIPEX-P) 37.5 MG tablet     Sig: Take 1 tablet by mouth every morning (before breakfast) .      Dispense:  30 tablet     Refill:  0       Electronically signed by Brad Garcia MD on 11/22/2017 at 1:45 PM       (Please note that portions of this note were completed with a voice recognition program. Efforts were made to edit the dictations but occasionally words are mis-transcribed.)

## 2017-12-12 ENCOUNTER — OFFICE VISIT (OUTPATIENT)
Dept: FAMILY MEDICINE CLINIC | Age: 37
End: 2017-12-12
Payer: MEDICARE

## 2017-12-12 VITALS
HEIGHT: 67 IN | OXYGEN SATURATION: 95 % | RESPIRATION RATE: 16 BRPM | SYSTOLIC BLOOD PRESSURE: 125 MMHG | BODY MASS INDEX: 33.74 KG/M2 | DIASTOLIC BLOOD PRESSURE: 85 MMHG | HEART RATE: 78 BPM | WEIGHT: 215 LBS

## 2017-12-12 DIAGNOSIS — E66.9 OBESITY (BMI 30.0-34.9): Primary | ICD-10-CM

## 2017-12-12 PROCEDURE — G8417 CALC BMI ABV UP PARAM F/U: HCPCS | Performed by: FAMILY MEDICINE

## 2017-12-12 PROCEDURE — G8484 FLU IMMUNIZE NO ADMIN: HCPCS | Performed by: FAMILY MEDICINE

## 2017-12-12 PROCEDURE — 99213 OFFICE O/P EST LOW 20 MIN: CPT | Performed by: FAMILY MEDICINE

## 2017-12-12 PROCEDURE — 1036F TOBACCO NON-USER: CPT | Performed by: FAMILY MEDICINE

## 2017-12-12 PROCEDURE — G8427 DOCREV CUR MEDS BY ELIG CLIN: HCPCS | Performed by: FAMILY MEDICINE

## 2017-12-12 RX ORDER — PHENTERMINE HYDROCHLORIDE 37.5 MG/1
37.5 TABLET ORAL
Qty: 30 TABLET | Refills: 0 | Status: SHIPPED | OUTPATIENT
Start: 2017-12-12 | End: 2018-01-11

## 2017-12-12 NOTE — PROGRESS NOTES
General FM note    David Ortiz is a 40 y.o. female who presents today for follow up on her  medical conditions as noted below. David Ortiz is c/o of   Chief Complaint   Patient presents with    Medication Refill       Patient Active Problem List:     Suicide attempt     Bipolar 1 disorder ([address]     Postnasal drip     Marijuana abuse     Obesity (BMI 30.0-34. 9)     Closed nondisplaced oblique fracture of shaft of left ulna     Closed nondisplaced oblique fracture of shaft of ulna with routine healing     Past Medical History:   Diagnosis Date    ADD (attention deficit disorder)     ADHD (attention deficit hyperactivity disorder)     Bipolar 1 disorder (HCC)     Bronchitis     Constipation     Depression     Diarrhea     Difficulty swallowing     Dizziness     Fibromyalgia     Fibromyalgia     Headache     HLD (hyperlipidemia)     IBS (irritable bowel syndrome)     Nausea & vomiting     Nervously anxious     SOB (shortness of breath)     Thyroid disorder     Wears glasses     Weight gain       Past Surgical History:   Procedure Laterality Date    DILATION AND CURETTAGE OF UTERUS      EXCISION / BIOPSY SKIN LESION OF LEG Left 2/24/2017    EXCISION MEDIAL THIGH LESION performed by Hussain Ovalles IV, DO at [address] [place] [address]      2 cone biopsys    PRE-MALIGNANT / BENIGN SKIN LESION EXCISION Left 02/24/2017    inner thigh     TONSILLECTOMY      adenoids     Family History   Problem Relation Age of Onset    Hypertension Mother     Diabetes Father     Brain Cancer Maternal Grandmother     Heart Defect Maternal Grandfather      Current Outpatient Prescriptions   Medication Sig Dispense Refill    phentermine (ADIPEX-P) 37.5 MG tablet Take 1 tablet by mouth every morning (before breakfast) . 30 tablet 0     No current facility-administered medications for this visit.       ALLERGIES:    Allergies   Allergen Reactions    Sulfamethoxazole-Trimethoprim      dont remember [facility] Social History   Substance Use Topics    Smoking status: Never Smoker    Smokeless tobacco: Never Used    Alcohol use 0.0 oz/week      Body mass index is 33.75 kg/m². /85   Pulse 78   Resp 16   Ht 5' 6.93\" (1.7 m)   Wt 215 lb (97.5 kg)   LMP 11/05/2017   SpO2 95%   BMI 33.75 kg/m²     Subjective:      HPI  40-year-old female coming today for weight check. She lost 4 pounds the second prescription of Adipex. Patient tells me that she did cut out pop. She has been trying to change her diet more foods more vegetables, less fast food. She also started medication membership. She has been there now for multiple times. After this appointment. She will go to her again. She denies any side effects from the Adipex. Review of Systems   Constitutional: Negative for fever and unexpected weight change. Respiratory: Negative for cough and shortness of breath. Cardiovascular: Negative for chest pain and leg swelling. Gastrointestinal: Negative for diarrhea, constipation and blood in stool. Skin: Negative for color change and rash. Objective:   Physical Exam  Constitutional: VS (see above). General appearance: normal development, habitus and attention, no deformities. Eyes: normal conjunctiva and lids. CAV: RRR, no RMG. No edema lower extremities. Pulmo: CTA bilateral, no CWR. Skin: no rashes, lesions or ulcers. Musculoskeletal: normal gait. Nails: no clubbing or cyanosis. Psychiatric: alert and oriented to place, time and person. Normal mood and affect. Assessment:      1. Obesity (BMI 30.0-34.9)  phentermine (ADIPEX-P) 37.5 MG tablet       Plan:   3rd prescription of Adipex provided. Patient will continue current diet and exercise regimen. I discussed with her to exercise at least 30 minutes 5 times a week. Decrease carbohydrate intake. Increase fibers and protein. Stop Adipex if you have any side effects.   Call or return to clinic prn if these symptoms worsen or fail

## 2018-05-11 DIAGNOSIS — S52.235D CLOSED NONDISPLACED OBLIQUE FRACTURE OF SHAFT OF LEFT ULNA WITH ROUTINE HEALING, SUBSEQUENT ENCOUNTER: Primary | ICD-10-CM

## 2018-05-14 ENCOUNTER — OFFICE VISIT (OUTPATIENT)
Dept: ORTHOPEDIC SURGERY | Age: 38
End: 2018-05-14
Payer: MEDICARE

## 2018-05-14 VITALS — HEIGHT: 67 IN | WEIGHT: 213.85 LBS | BODY MASS INDEX: 33.56 KG/M2

## 2018-05-14 DIAGNOSIS — S52.571A OTHER CLOSED INTRA-ARTICULAR FRACTURE OF DISTAL END OF RIGHT RADIUS, INITIAL ENCOUNTER: ICD-10-CM

## 2018-05-14 DIAGNOSIS — M25.572 ACUTE LEFT ANKLE PAIN: Primary | ICD-10-CM

## 2018-05-14 PROCEDURE — 1036F TOBACCO NON-USER: CPT | Performed by: STUDENT IN AN ORGANIZED HEALTH CARE EDUCATION/TRAINING PROGRAM

## 2018-05-14 PROCEDURE — G8427 DOCREV CUR MEDS BY ELIG CLIN: HCPCS | Performed by: STUDENT IN AN ORGANIZED HEALTH CARE EDUCATION/TRAINING PROGRAM

## 2018-05-14 PROCEDURE — 99213 OFFICE O/P EST LOW 20 MIN: CPT | Performed by: STUDENT IN AN ORGANIZED HEALTH CARE EDUCATION/TRAINING PROGRAM

## 2018-05-14 PROCEDURE — G8417 CALC BMI ABV UP PARAM F/U: HCPCS | Performed by: STUDENT IN AN ORGANIZED HEALTH CARE EDUCATION/TRAINING PROGRAM

## 2018-05-14 RX ORDER — IBUPROFEN 600 MG/1
600 TABLET ORAL EVERY 6 HOURS PRN
Status: ON HOLD | COMMUNITY
End: 2020-07-01

## 2018-05-14 RX ORDER — ACETAMINOPHEN AND CODEINE PHOSPHATE 300; 30 MG/1; MG/1
1 TABLET ORAL
Qty: 25 TABLET | Refills: 0 | Status: SHIPPED | OUTPATIENT
Start: 2018-05-14 | End: 2018-05-21

## 2018-06-29 DIAGNOSIS — S52.235D CLOSED NONDISPLACED OBLIQUE FRACTURE OF SHAFT OF LEFT ULNA WITH ROUTINE HEALING, SUBSEQUENT ENCOUNTER: Primary | ICD-10-CM

## 2018-07-02 ENCOUNTER — OFFICE VISIT (OUTPATIENT)
Dept: ORTHOPEDIC SURGERY | Age: 38
End: 2018-07-02
Payer: COMMERCIAL

## 2018-07-02 VITALS — HEIGHT: 66 IN | BODY MASS INDEX: 34.07 KG/M2 | WEIGHT: 212 LBS

## 2018-07-02 DIAGNOSIS — S52.571D OTHER CLOSED INTRA-ARTICULAR FRACTURE OF DISTAL END OF RIGHT RADIUS WITH ROUTINE HEALING, SUBSEQUENT ENCOUNTER: Primary | ICD-10-CM

## 2018-07-02 PROBLEM — S52.501D CLOSED FRACTURE OF LOWER END OF RIGHT RADIUS WITH ROUTINE HEALING: Status: ACTIVE | Noted: 2018-07-02

## 2018-07-02 PROCEDURE — G8427 DOCREV CUR MEDS BY ELIG CLIN: HCPCS | Performed by: STUDENT IN AN ORGANIZED HEALTH CARE EDUCATION/TRAINING PROGRAM

## 2018-07-02 PROCEDURE — G8417 CALC BMI ABV UP PARAM F/U: HCPCS | Performed by: STUDENT IN AN ORGANIZED HEALTH CARE EDUCATION/TRAINING PROGRAM

## 2018-07-02 PROCEDURE — 1036F TOBACCO NON-USER: CPT | Performed by: STUDENT IN AN ORGANIZED HEALTH CARE EDUCATION/TRAINING PROGRAM

## 2018-07-02 PROCEDURE — 99213 OFFICE O/P EST LOW 20 MIN: CPT | Performed by: STUDENT IN AN ORGANIZED HEALTH CARE EDUCATION/TRAINING PROGRAM

## 2018-07-02 NOTE — PROGRESS NOTES
stiffness. Patient is able actively flex and extend the fingers. Radial, median, ulnar sensation is intact grossly. Radial/median fall just ulnar/AIN/PIN motor complex intact. Patient does note to have weakness of flexion of the 5th digit which inhibits her  strength.  strength is noted to be 4-5 on the right compared to 4+ out of 5 on the left. Mild tenderness to palpation over the distal radius, tenderness is mostly noted with attempted passive range of motion of the wrist.  Neuro: alert. oriented  Eyes: Extra-ocular muscles intact  Mouth: Oral mucosa moist. No perioral lesions  Pulm: Respirations unlabored and regular. Skin: warm, well perfused  Psych:   Patient has good fund of knowledge and displays understanging of exam, diagnosis, and plan. Radiology:   History:   Distal radius fracture status post fall from horse     Comparison:   Radiographs from May 14, 2018 available for comparison    Findings:   3 views of the right wrist demonstrate a healing distal radius fracture. There has been interval shortening compared to previous radiographs as well as some loss of radial inclination. The radiocarpal joint is noted to have decreased space compared to previous radiographs. Lateral regress demonstrate relative maintenance of the volar tilt, with minimal displacement of the volar shear fragment. Impression:  Healing right distal radius fracture with intra-articular extension, relative maintenance of position with some loss of radial inclination and height compared to previous radiographs    Assessment:      1. Other closed intra-articular fracture of distal end of right radius with routine healing, subsequent encounter       Plan:   1.  Long discussion with the patient due to her lack of follow-up and what this means for her wrist. At this time patient does not require surgery but will need intensive therapy and occupational therapy in order to regain the motion of her right hand especially because

## 2018-08-13 ENCOUNTER — TELEPHONE (OUTPATIENT)
Dept: INTERNAL MEDICINE CLINIC | Age: 38
End: 2018-08-13

## 2018-10-15 ENCOUNTER — OFFICE VISIT (OUTPATIENT)
Dept: FAMILY MEDICINE CLINIC | Age: 38
End: 2018-10-15
Payer: COMMERCIAL

## 2018-10-15 VITALS
HEIGHT: 66 IN | WEIGHT: 224 LBS | TEMPERATURE: 97.7 F | OXYGEN SATURATION: 96 % | RESPIRATION RATE: 16 BRPM | SYSTOLIC BLOOD PRESSURE: 104 MMHG | DIASTOLIC BLOOD PRESSURE: 74 MMHG | BODY MASS INDEX: 36 KG/M2 | HEART RATE: 71 BPM

## 2018-10-15 DIAGNOSIS — R20.0 NUMBNESS AND TINGLING IN RIGHT HAND: Primary | ICD-10-CM

## 2018-10-15 DIAGNOSIS — S52.235S: ICD-10-CM

## 2018-10-15 DIAGNOSIS — R20.2 NUMBNESS AND TINGLING IN RIGHT HAND: Primary | ICD-10-CM

## 2018-10-15 DIAGNOSIS — F31.9 BIPOLAR 1 DISORDER (HCC): ICD-10-CM

## 2018-10-15 DIAGNOSIS — M25.531 PAIN, WRIST, RIGHT: ICD-10-CM

## 2018-10-15 PROCEDURE — G8427 DOCREV CUR MEDS BY ELIG CLIN: HCPCS | Performed by: FAMILY MEDICINE

## 2018-10-15 PROCEDURE — G8417 CALC BMI ABV UP PARAM F/U: HCPCS | Performed by: FAMILY MEDICINE

## 2018-10-15 PROCEDURE — G8484 FLU IMMUNIZE NO ADMIN: HCPCS | Performed by: FAMILY MEDICINE

## 2018-10-15 PROCEDURE — 1036F TOBACCO NON-USER: CPT | Performed by: FAMILY MEDICINE

## 2018-10-15 PROCEDURE — 99213 OFFICE O/P EST LOW 20 MIN: CPT | Performed by: FAMILY MEDICINE

## 2018-10-15 RX ORDER — GABAPENTIN 300 MG/1
300 CAPSULE ORAL 3 TIMES DAILY
Qty: 90 CAPSULE | Refills: 3 | Status: SHIPPED | OUTPATIENT
Start: 2018-10-15 | End: 2019-01-07 | Stop reason: SDUPTHER

## 2018-10-15 ASSESSMENT — PATIENT HEALTH QUESTIONNAIRE - PHQ9
SUM OF ALL RESPONSES TO PHQ9 QUESTIONS 1 & 2: 0
1. LITTLE INTEREST OR PLEASURE IN DOING THINGS: 0
2. FEELING DOWN, DEPRESSED OR HOPELESS: 0
SUM OF ALL RESPONSES TO PHQ QUESTIONS 1-9: 0
SUM OF ALL RESPONSES TO PHQ QUESTIONS 1-9: 0

## 2018-10-15 NOTE — PROGRESS NOTES
Visit Information    Have you changed or started any medications since your last visit including any over-the-counter medicines, vitamins, or herbal medicines? no   Are you having any side effects from any of your medications? -  no  Have you stopped taking any of your medications? Is so, why? -  no    Have you seen any other physician or provider since your last visit? No  Have you had any other diagnostic tests since your last visit? No  Have you been seen in the emergency room and/or had an admission to a hospital since we last saw you? No  Have you had your routine dental cleaning in the past 6 months? yes    Have you activated your [employer] account? If not, what are your barriers?  No:    Patient Care Team:  Rosa Vernon MD as PCP - General (Family Medicine)  Rosa Vernon MD as PCP - [facility] Attributed Provider    Medical History Review  Past Medical, Family, and Social History reviewed and does not contribute to the patient presenting condition    Health Maintenance   Topic Date Due    Cervical cancer screen  11/10/2018    Flu vaccine (1) 10/16/2018 (Originally 9/1/2018)    DTaP/Tdap/Td vaccine (2 - Tdap) 08/13/2019    HIV screen  Completed

## 2018-10-22 ENCOUNTER — TELEPHONE (OUTPATIENT)
Dept: FAMILY MEDICINE CLINIC | Age: 38
End: 2018-10-22

## 2018-10-23 DIAGNOSIS — R20.0 HAND NUMBNESS: Primary | ICD-10-CM

## 2018-10-25 DIAGNOSIS — S52.235D CLOSED NONDISPLACED OBLIQUE FRACTURE OF SHAFT OF LEFT ULNA WITH ROUTINE HEALING, SUBSEQUENT ENCOUNTER: Primary | ICD-10-CM

## 2018-10-29 ENCOUNTER — OFFICE VISIT (OUTPATIENT)
Dept: ORTHOPEDIC SURGERY | Age: 38
End: 2018-10-29
Payer: COMMERCIAL

## 2018-10-29 VITALS — HEIGHT: 66 IN | BODY MASS INDEX: 35.36 KG/M2 | WEIGHT: 220 LBS

## 2018-10-29 DIAGNOSIS — S52.571D OTHER CLOSED INTRA-ARTICULAR FRACTURE OF DISTAL END OF RIGHT RADIUS WITH ROUTINE HEALING, SUBSEQUENT ENCOUNTER: Primary | ICD-10-CM

## 2018-10-29 PROCEDURE — 1036F TOBACCO NON-USER: CPT | Performed by: STUDENT IN AN ORGANIZED HEALTH CARE EDUCATION/TRAINING PROGRAM

## 2018-10-29 PROCEDURE — G8427 DOCREV CUR MEDS BY ELIG CLIN: HCPCS | Performed by: STUDENT IN AN ORGANIZED HEALTH CARE EDUCATION/TRAINING PROGRAM

## 2018-10-29 PROCEDURE — G8417 CALC BMI ABV UP PARAM F/U: HCPCS | Performed by: STUDENT IN AN ORGANIZED HEALTH CARE EDUCATION/TRAINING PROGRAM

## 2018-10-29 PROCEDURE — 99213 OFFICE O/P EST LOW 20 MIN: CPT | Performed by: STUDENT IN AN ORGANIZED HEALTH CARE EDUCATION/TRAINING PROGRAM

## 2018-10-29 PROCEDURE — G8484 FLU IMMUNIZE NO ADMIN: HCPCS | Performed by: STUDENT IN AN ORGANIZED HEALTH CARE EDUCATION/TRAINING PROGRAM

## 2018-10-29 RX ORDER — IBUPROFEN 400 MG/1
800 TABLET ORAL EVERY 8 HOURS PRN
Qty: 120 TABLET | Refills: 0 | Status: ON HOLD | OUTPATIENT
Start: 2018-10-29 | End: 2020-07-01

## 2018-10-29 ASSESSMENT — ENCOUNTER SYMPTOMS
SHORTNESS OF BREATH: 0
NAUSEA: 0
VOMITING: 0

## 2018-10-29 NOTE — PROGRESS NOTES
I performed a history and physical examination of the patient and discussed management with the resident. I reviewed the residents note and agree with the documented findings and plan of care. Any areas of disagreement are noted on the chart. I have personally evaluated this patient and have completed at least one if not all key elements of the E/M (history, physical exam, and MDM). Additional findings are as noted. I agree with the chief complaint, past medical history, past surgical history, allergies, medications, social and family history as documented unless otherwise noted below.      Electronically signed by Jose Angel Thorne DO on 10/29/2018 at 11:53 AM
oriented and sitting comfortably in our office. Ortho Exam    RUE: Deformity to wrist. PROM: 30 degrees of flexion at wrist, 0 degrees of extension, 70 degrees of supination, 80 degrees of pronation. Positive tinel's sign at cubital tunnel and ulnar-sided numbness on Ang's exam.  Neuro: alert and oriented to person and place. Eyes: Extra-ocular muscles intact  Mouth: Oral mucosa moist. No perioral lesions  Pulm: Respirations unlabored and regular. Symmetric chest excursion without outward deformity is noted. Skin: warm, well perfused  Psych: Patient has good fund of knowledge and displays understanging of exam, diagnosis, and plan. Radiology:   History:   40 F with R wrist fx  Comparison:   July 2018  Findings:   3 views of the R wrist demonstrate a healed distal radius fracture with loss of radial height and loss of volar tilt  Impression:  Healed R distal radius fx    Assessment:      1. Other closed intra-articular fracture of distal end of right radius with routine healing, subsequent encounter       Plan:      Had lengthy discussion with pt in regards to treatment moving forward, we will attempt non-surgical management in the form of hand therapy at this time. She relays that since she now has insurance, this should not be an issue. We hope that pt will be able to regain functional extension/flexion but due to the prolonged nature of the immobilization, we are concerned that she will not be to. A prescription for ibuprofen was provided in office today as she was unable to fill her previous prescription. She denies having any issues taking NSAIDs and reports that she tolerates them well. Follow up in 8 weeks for re-evaluation     Follow up:No Follow-up on file. No orders of the defined types were placed in this encounter.         Orders Placed This Encounter   Procedures    XR WRIST RIGHT (MIN 3 VIEWS)     Electronically signed by Mando Ray DO on 10/29/2018 at 11:10 AM

## 2018-11-02 ENCOUNTER — HOSPITAL ENCOUNTER (OUTPATIENT)
Dept: OCCUPATIONAL THERAPY | Age: 38
Setting detail: THERAPIES SERIES
Discharge: HOME | End: 2018-11-02
Payer: COMMERCIAL

## 2018-11-02 PROCEDURE — 97110 THERAPEUTIC EXERCISES: CPT

## 2018-11-02 PROCEDURE — 97140 MANUAL THERAPY 1/> REGIONS: CPT

## 2018-11-02 PROCEDURE — 97165 OT EVAL LOW COMPLEX 30 MIN: CPT

## 2018-11-02 NOTE — CONSULTS
restrictions) [x]  1-2 []  3 []  4+   Decision Making [x]  Low []  Moderate []  High   ? [x]  Low Complexity []  Moderate Complexity []  High Complexity       Total Treatment Time: 35    Time In: 1100    Time Out: 1200       Electronically signed by DINAH Simms on 11/2/2018 at 11:09 AM        Physician Signature: _________________________ Date: _______________  By signing above or cosigning this note, I have reviewed this plan of care and certify a need for medically necessary rehabilitation services.      *PLEASE SIGN ABOVE AND FAX BACK ALL PAGES*

## 2018-11-08 ENCOUNTER — OFFICE VISIT (OUTPATIENT)
Dept: FAMILY MEDICINE CLINIC | Age: 38
End: 2018-11-08
Payer: COMMERCIAL

## 2018-11-08 VITALS
SYSTOLIC BLOOD PRESSURE: 118 MMHG | HEART RATE: 68 BPM | DIASTOLIC BLOOD PRESSURE: 79 MMHG | OXYGEN SATURATION: 100 % | BODY MASS INDEX: 36.48 KG/M2 | WEIGHT: 226 LBS

## 2018-11-08 DIAGNOSIS — S33.5XXD SPRAIN OF LOW BACK, SUBSEQUENT ENCOUNTER: Primary | ICD-10-CM

## 2018-11-08 PROCEDURE — 99213 OFFICE O/P EST LOW 20 MIN: CPT | Performed by: FAMILY MEDICINE

## 2018-11-08 PROCEDURE — G8417 CALC BMI ABV UP PARAM F/U: HCPCS | Performed by: FAMILY MEDICINE

## 2018-11-08 PROCEDURE — G8484 FLU IMMUNIZE NO ADMIN: HCPCS | Performed by: FAMILY MEDICINE

## 2018-11-08 PROCEDURE — 1036F TOBACCO NON-USER: CPT | Performed by: FAMILY MEDICINE

## 2018-11-08 PROCEDURE — G8427 DOCREV CUR MEDS BY ELIG CLIN: HCPCS | Performed by: FAMILY MEDICINE

## 2018-11-08 RX ORDER — METHOCARBAMOL 500 MG/1
500 TABLET, FILM COATED ORAL 4 TIMES DAILY
Qty: 40 TABLET | Refills: 0 | Status: SHIPPED | OUTPATIENT
Start: 2018-11-08 | End: 2018-11-18

## 2018-11-08 RX ORDER — LIDOCAINE 50 MG/G
OINTMENT TOPICAL
Qty: 240 G | Refills: 1 | Status: SHIPPED | OUTPATIENT
Start: 2018-11-08 | End: 2019-07-25 | Stop reason: SDUPTHER

## 2018-11-09 ENCOUNTER — HOSPITAL ENCOUNTER (OUTPATIENT)
Dept: OCCUPATIONAL THERAPY | Age: 38
Setting detail: THERAPIES SERIES
Discharge: HOME | End: 2018-11-09
Payer: COMMERCIAL

## 2018-11-09 PROCEDURE — 97110 THERAPEUTIC EXERCISES: CPT

## 2018-11-09 PROCEDURE — 97140 MANUAL THERAPY 1/> REGIONS: CPT

## 2018-11-12 ENCOUNTER — TELEPHONE (OUTPATIENT)
Dept: FAMILY MEDICINE CLINIC | Age: 38
End: 2018-11-12

## 2018-11-12 DIAGNOSIS — G56.00 CARPAL TUNNEL SYNDROME, UNSPECIFIED LATERALITY: Primary | ICD-10-CM

## 2018-11-12 DIAGNOSIS — R20.2 NUMBNESS AND TINGLING IN RIGHT HAND: ICD-10-CM

## 2018-11-12 DIAGNOSIS — R20.0 NUMBNESS AND TINGLING IN RIGHT HAND: ICD-10-CM

## 2018-11-15 ENCOUNTER — TELEPHONE (OUTPATIENT)
Dept: FAMILY MEDICINE CLINIC | Age: 38
End: 2018-11-15

## 2018-11-15 DIAGNOSIS — S33.5XXS SPRAIN OF LOW BACK, SEQUELA: Primary | ICD-10-CM

## 2018-11-15 RX ORDER — TRAMADOL HYDROCHLORIDE 100 MG/1
100 TABLET, FILM COATED, EXTENDED RELEASE ORAL DAILY
Qty: 7 TABLET | Refills: 0 | Status: SHIPPED | OUTPATIENT
Start: 2018-11-15 | End: 2018-11-22

## 2018-11-15 NOTE — TELEPHONE ENCOUNTER
Pt states she found an old Rx of Tramadol and was able to sleep and wake up feeling like she could really move. Muscle relaxer does not seem to be helping. Wondering if she could be prescribed Tramadol 50 mg. Only has 2 pills left from old script to take.

## 2018-11-16 ENCOUNTER — HOSPITAL ENCOUNTER (OUTPATIENT)
Dept: OCCUPATIONAL THERAPY | Age: 38
Setting detail: THERAPIES SERIES
Discharge: HOME | End: 2018-11-16
Payer: COMMERCIAL

## 2018-11-30 ENCOUNTER — HOSPITAL ENCOUNTER (OUTPATIENT)
Dept: OCCUPATIONAL THERAPY | Age: 38
Setting detail: THERAPIES SERIES
Discharge: HOME | End: 2018-11-30
Payer: COMMERCIAL

## 2018-11-30 PROCEDURE — 97140 MANUAL THERAPY 1/> REGIONS: CPT

## 2018-11-30 PROCEDURE — 97110 THERAPEUTIC EXERCISES: CPT

## 2019-01-07 ENCOUNTER — OFFICE VISIT (OUTPATIENT)
Dept: FAMILY MEDICINE CLINIC | Age: 39
End: 2019-01-07
Payer: MEDICARE

## 2019-01-07 VITALS
WEIGHT: 230 LBS | SYSTOLIC BLOOD PRESSURE: 113 MMHG | OXYGEN SATURATION: 98 % | HEART RATE: 90 BPM | DIASTOLIC BLOOD PRESSURE: 74 MMHG | BODY MASS INDEX: 37.12 KG/M2 | TEMPERATURE: 98.1 F

## 2019-01-07 DIAGNOSIS — R20.0 NUMBNESS AND TINGLING IN RIGHT HAND: ICD-10-CM

## 2019-01-07 DIAGNOSIS — E66.09 CLASS 2 OBESITY DUE TO EXCESS CALORIES WITHOUT SERIOUS COMORBIDITY WITH BODY MASS INDEX (BMI) OF 37.0 TO 37.9 IN ADULT: Primary | ICD-10-CM

## 2019-01-07 DIAGNOSIS — F31.9 BIPOLAR 1 DISORDER (HCC): ICD-10-CM

## 2019-01-07 DIAGNOSIS — R20.2 NUMBNESS AND TINGLING IN RIGHT HAND: ICD-10-CM

## 2019-01-07 DIAGNOSIS — M25.531 PAIN, WRIST, RIGHT: ICD-10-CM

## 2019-01-07 PROCEDURE — 1036F TOBACCO NON-USER: CPT | Performed by: FAMILY MEDICINE

## 2019-01-07 PROCEDURE — G8427 DOCREV CUR MEDS BY ELIG CLIN: HCPCS | Performed by: FAMILY MEDICINE

## 2019-01-07 PROCEDURE — 99213 OFFICE O/P EST LOW 20 MIN: CPT | Performed by: FAMILY MEDICINE

## 2019-01-07 PROCEDURE — G8484 FLU IMMUNIZE NO ADMIN: HCPCS | Performed by: FAMILY MEDICINE

## 2019-01-07 PROCEDURE — G8417 CALC BMI ABV UP PARAM F/U: HCPCS | Performed by: FAMILY MEDICINE

## 2019-01-07 RX ORDER — GABAPENTIN 300 MG/1
300 CAPSULE ORAL 3 TIMES DAILY
Qty: 90 CAPSULE | Refills: 3 | Status: SHIPPED | OUTPATIENT
Start: 2019-01-07 | End: 2019-07-07 | Stop reason: SDUPTHER

## 2019-01-07 ASSESSMENT — PATIENT HEALTH QUESTIONNAIRE - PHQ9
2. FEELING DOWN, DEPRESSED OR HOPELESS: 0
SUM OF ALL RESPONSES TO PHQ QUESTIONS 1-9: 0
1. LITTLE INTEREST OR PLEASURE IN DOING THINGS: 0
SUM OF ALL RESPONSES TO PHQ QUESTIONS 1-9: 0
SUM OF ALL RESPONSES TO PHQ9 QUESTIONS 1 & 2: 0

## 2019-01-14 DIAGNOSIS — E66.09 OBESITY DUE TO EXCESS CALORIES WITHOUT SERIOUS COMORBIDITY, UNSPECIFIED CLASSIFICATION: Primary | ICD-10-CM

## 2019-01-14 RX ORDER — PHENTERMINE HYDROCHLORIDE 37.5 MG/1
TABLET ORAL
Qty: 30 TABLET | Refills: 0 | Status: SHIPPED | OUTPATIENT
Start: 2019-01-14 | End: 2019-02-13

## 2019-02-25 ENCOUNTER — OFFICE VISIT (OUTPATIENT)
Dept: FAMILY MEDICINE CLINIC | Age: 39
End: 2019-02-25
Payer: MEDICARE

## 2019-02-25 VITALS
HEART RATE: 58 BPM | DIASTOLIC BLOOD PRESSURE: 73 MMHG | OXYGEN SATURATION: 95 % | SYSTOLIC BLOOD PRESSURE: 111 MMHG | WEIGHT: 229 LBS | BODY MASS INDEX: 36.96 KG/M2

## 2019-02-25 DIAGNOSIS — M54.41 CHRONIC BILATERAL LOW BACK PAIN WITH BILATERAL SCIATICA: ICD-10-CM

## 2019-02-25 DIAGNOSIS — M54.42 CHRONIC BILATERAL LOW BACK PAIN WITH BILATERAL SCIATICA: ICD-10-CM

## 2019-02-25 DIAGNOSIS — G89.29 CHRONIC BILATERAL LOW BACK PAIN WITH BILATERAL SCIATICA: ICD-10-CM

## 2019-02-25 DIAGNOSIS — M25.562 CHRONIC PAIN OF LEFT KNEE: Primary | ICD-10-CM

## 2019-02-25 DIAGNOSIS — G89.29 CHRONIC PAIN OF LEFT KNEE: Primary | ICD-10-CM

## 2019-02-25 PROCEDURE — 99213 OFFICE O/P EST LOW 20 MIN: CPT | Performed by: FAMILY MEDICINE

## 2019-02-25 PROCEDURE — 1036F TOBACCO NON-USER: CPT | Performed by: FAMILY MEDICINE

## 2019-02-25 PROCEDURE — G8417 CALC BMI ABV UP PARAM F/U: HCPCS | Performed by: FAMILY MEDICINE

## 2019-02-25 PROCEDURE — G8484 FLU IMMUNIZE NO ADMIN: HCPCS | Performed by: FAMILY MEDICINE

## 2019-02-25 PROCEDURE — G8427 DOCREV CUR MEDS BY ELIG CLIN: HCPCS | Performed by: FAMILY MEDICINE

## 2019-07-07 DIAGNOSIS — M25.531 PAIN, WRIST, RIGHT: ICD-10-CM

## 2019-07-07 DIAGNOSIS — R20.2 NUMBNESS AND TINGLING IN RIGHT HAND: ICD-10-CM

## 2019-07-07 DIAGNOSIS — R20.0 NUMBNESS AND TINGLING IN RIGHT HAND: ICD-10-CM

## 2019-07-09 RX ORDER — GABAPENTIN 300 MG/1
CAPSULE ORAL
Qty: 90 CAPSULE | Refills: 3 | Status: SHIPPED | OUTPATIENT
Start: 2019-07-09 | End: 2019-11-09 | Stop reason: SDUPTHER

## 2019-07-25 ENCOUNTER — HOSPITAL ENCOUNTER (OUTPATIENT)
Age: 39
Setting detail: SPECIMEN
Discharge: HOME | End: 2019-07-25
Payer: MEDICARE

## 2019-07-25 ENCOUNTER — OFFICE VISIT (OUTPATIENT)
Dept: FAMILY MEDICINE CLINIC | Age: 39
End: 2019-07-25
Payer: MEDICARE

## 2019-07-25 VITALS
WEIGHT: 219 LBS | SYSTOLIC BLOOD PRESSURE: 110 MMHG | OXYGEN SATURATION: 97 % | BODY MASS INDEX: 35.35 KG/M2 | DIASTOLIC BLOOD PRESSURE: 78 MMHG | HEART RATE: 53 BPM

## 2019-07-25 DIAGNOSIS — E66.09 CLASS 2 OBESITY DUE TO EXCESS CALORIES WITHOUT SERIOUS COMORBIDITY WITH BODY MASS INDEX (BMI) OF 35.0 TO 35.9 IN ADULT: ICD-10-CM

## 2019-07-25 DIAGNOSIS — S33.5XXD SPRAIN OF LOW BACK, SUBSEQUENT ENCOUNTER: ICD-10-CM

## 2019-07-25 DIAGNOSIS — Z00.01 ENCOUNTER FOR WELL ADULT EXAM WITH ABNORMAL FINDINGS: Primary | ICD-10-CM

## 2019-07-25 DIAGNOSIS — Z00.01 ENCOUNTER FOR WELL ADULT EXAM WITH ABNORMAL FINDINGS: ICD-10-CM

## 2019-07-25 LAB
ABSOLUTE EOS #: 0.29 K/UL (ref 0–0.44)
ABSOLUTE IMMATURE GRANULOCYTE: <0.03 K/UL (ref 0–0.3)
ABSOLUTE LYMPH #: 1.35 K/UL (ref 1.1–3.7)
ABSOLUTE MONO #: 0.37 K/UL (ref 0.1–1.2)
ALBUMIN SERPL-MCNC: 3.9 G/DL (ref 3.5–5.2)
ALBUMIN/GLOBULIN RATIO: 1.2 (ref 1–2.5)
ALP BLD-CCNC: 52 U/L (ref 35–104)
ALT SERPL-CCNC: 16 U/L (ref 5–33)
ANION GAP SERPL CALCULATED.3IONS-SCNC: 12 MMOL/L (ref 9–17)
AST SERPL-CCNC: 15 U/L
BASOPHILS # BLD: 1 % (ref 0–2)
BASOPHILS ABSOLUTE: 0.03 K/UL (ref 0–0.2)
BILIRUB SERPL-MCNC: 0.23 MG/DL (ref 0.3–1.2)
BILIRUBIN URINE: NEGATIVE
BUN BLDV-MCNC: 9 MG/DL (ref 6–20)
BUN/CREAT BLD: ABNORMAL (ref 9–20)
CALCIUM SERPL-MCNC: 8.6 MG/DL (ref 8.6–10.4)
CHLORIDE BLD-SCNC: 107 MMOL/L (ref 98–107)
CO2: 23 MMOL/L (ref 20–31)
COLOR: YELLOW
COMMENT UA: NORMAL
CREAT SERPL-MCNC: 0.58 MG/DL (ref 0.5–0.9)
DIFFERENTIAL TYPE: ABNORMAL
EOSINOPHILS RELATIVE PERCENT: 6 % (ref 1–4)
GFR AFRICAN AMERICAN: >60 ML/MIN
GFR NON-AFRICAN AMERICAN: >60 ML/MIN
GFR SERPL CREATININE-BSD FRML MDRD: ABNORMAL ML/MIN/{1.73_M2}
GFR SERPL CREATININE-BSD FRML MDRD: ABNORMAL ML/MIN/{1.73_M2}
GLUCOSE BLD-MCNC: 91 MG/DL (ref 70–99)
GLUCOSE URINE: NEGATIVE
HCT VFR BLD CALC: 39.5 % (ref 36.3–47.1)
HEMOGLOBIN: 12.7 G/DL (ref 11.9–15.1)
IMMATURE GRANULOCYTES: 0 %
KETONES, URINE: NEGATIVE
LEUKOCYTE ESTERASE, URINE: NEGATIVE
LYMPHOCYTES # BLD: 26 % (ref 24–43)
MCH RBC QN AUTO: 30.2 PG (ref 25.2–33.5)
MCHC RBC AUTO-ENTMCNC: 32.2 G/DL (ref 28.4–34.8)
MCV RBC AUTO: 94 FL (ref 82.6–102.9)
MONOCYTES # BLD: 7 % (ref 3–12)
NITRITE, URINE: NEGATIVE
NRBC AUTOMATED: 0 PER 100 WBC
PDW BLD-RTO: 11.8 % (ref 11.8–14.4)
PH UA: 5.5 (ref 5–8)
PLATELET # BLD: 272 K/UL (ref 138–453)
PLATELET ESTIMATE: ABNORMAL
PMV BLD AUTO: 9.9 FL (ref 8.1–13.5)
POTASSIUM SERPL-SCNC: 4.5 MMOL/L (ref 3.7–5.3)
PROTEIN UA: NEGATIVE
RBC # BLD: 4.2 M/UL (ref 3.95–5.11)
RBC # BLD: ABNORMAL 10*6/UL
SEG NEUTROPHILS: 60 % (ref 36–65)
SEGMENTED NEUTROPHILS ABSOLUTE COUNT: 3.21 K/UL (ref 1.5–8.1)
SODIUM BLD-SCNC: 142 MMOL/L (ref 135–144)
SPECIFIC GRAVITY UA: 1.02 (ref 1–1.03)
TOTAL PROTEIN: 7.1 G/DL (ref 6.4–8.3)
TSH SERPL DL<=0.05 MIU/L-ACNC: 1.39 MIU/L (ref 0.3–5)
TURBIDITY: CLEAR
URINE HGB: NEGATIVE
UROBILINOGEN, URINE: NORMAL
WBC # BLD: 5.3 K/UL (ref 3.5–11.3)
WBC # BLD: ABNORMAL 10*3/UL

## 2019-07-25 PROCEDURE — 99395 PREV VISIT EST AGE 18-39: CPT | Performed by: FAMILY MEDICINE

## 2019-07-25 PROCEDURE — G8427 DOCREV CUR MEDS BY ELIG CLIN: HCPCS | Performed by: FAMILY MEDICINE

## 2019-07-25 PROCEDURE — 1036F TOBACCO NON-USER: CPT | Performed by: FAMILY MEDICINE

## 2019-07-25 PROCEDURE — 99213 OFFICE O/P EST LOW 20 MIN: CPT | Performed by: FAMILY MEDICINE

## 2019-07-25 PROCEDURE — G8417 CALC BMI ABV UP PARAM F/U: HCPCS | Performed by: FAMILY MEDICINE

## 2019-07-25 RX ORDER — LIDOCAINE 50 MG/G
OINTMENT TOPICAL
Qty: 240 G | Refills: 1 | Status: ON HOLD | OUTPATIENT
Start: 2019-07-25 | End: 2020-07-01 | Stop reason: ALTCHOICE

## 2019-07-25 RX ORDER — PHENTERMINE HYDROCHLORIDE 37.5 MG/1
37.5 TABLET ORAL
Qty: 30 TABLET | Refills: 0 | Status: SHIPPED | OUTPATIENT
Start: 2019-07-25 | End: 2019-08-22 | Stop reason: SDUPTHER

## 2019-07-25 NOTE — PROGRESS NOTES
Subjective:       Franklyn Watson is a 45 y.o. female and is here for a comprehensive physical exam.  The patient reports problems - : more walking and no pop. The patient has been trying to change her diet so she lost over last couple of months at least 10 pounds. She is very happy with her. She also continues to have some discomfort in her left ankle area where she had a bad fall. She states that the gabapentin helps her quite a bit with her shoulder aches after the fall.      History:  Any STD's in the past? none  Past Medical History:   Diagnosis Date    ADD (attention deficit disorder)     ADHD (attention deficit hyperactivity disorder)     Bipolar 1 disorder (HCC)     Bronchitis     Constipation     Depression     Diarrhea     Difficulty swallowing     Dizziness     Fibromyalgia     Fibromyalgia     Headache     HLD (hyperlipidemia)     IBS (irritable bowel syndrome)     Nausea & vomiting     Nervously anxious     SOB (shortness of breath)     Thyroid disorder     Wears glasses     Weight gain      Patient Active Problem List    Diagnosis Date Noted    Closed fracture of lower end of right radius with routine healing 07/02/2018    Closed nondisplaced oblique fracture of shaft of ulna with routine healing 04/03/2017    Closed nondisplaced oblique fracture of shaft of left ulna 03/22/2017    Postnasal drip 09/17/2015    Marijuana abuse 09/17/2015    Obesity (BMI 30.0-34.9) 09/17/2015    Suicide attempt ([facility] 10/18/2013    Bipolar 1 disorder (HCC)      Past Surgical History:   Procedure Laterality Date    DILATION AND CURETTAGE OF UTERUS      EXCISION / BIOPSY SKIN LESION OF LEG Left 2/24/2017    EXCISION MEDIAL THIGH LESION performed by Miguelito Ovalles IV, DO at [facility]      2 cone biopsys    PRE-MALIGNANT / BENIGN SKIN LESION EXCISION Left 02/24/2017    inner thigh     TONSILLECTOMY      adenoids     Family History   Problem Relation Age of Onset as needed.  phentermine (ADIPEX-P) 37.5 MG tablet 30 tablet 0     Sig: Take 1 tablet by mouth every morning (before breakfast) for 30 days. All patient questions answered. Patient voiced understanding. Quality Measures    Body mass index is 35.35 kg/m². Elevated. Weight control planned discussed medically supervised diet with primary care physician, daily exercise regimen and Healthy diet and regular exercise. BP: 110/78 Blood pressure is normal. Treatment plan consists of No treatment change needed.     Lab Results   Component Value Date    LDLCHOLESTEROL 131 (H) 04/15/2016    (goal LDL reduction with dx if diabetes is 50% LDL reduction)      PHQ Scores 1/7/2019 10/15/2018 10/16/2017 7/18/2016   PHQ2 Score 0 0 0 0   PHQ9 Score 0 0 0 0     Interpretation of Total Score Depression Severity: 1-4 = Minimal depression, 5-9 = Mild depression, 10-14 = Moderate depression, 15-19 = Moderately severe depression, 20-27 = Severe depression     Follow up in 4 weeks     (Please note that portions of this note were completed with a voice recognition program. Efforts were made to edit the dictations but occasionally words are mis-transcribed.)

## 2019-07-25 NOTE — PROGRESS NOTES
Visit Information    Have you changed or started any medications since your last visit including any over-the-counter medicines, vitamins, or herbal medicines? no   Are you having any side effects from any of your medications? -  no  Have you stopped taking any of your medications? Is so, why? -  no    Have you seen any other physician or provider since your last visit? No  Have you had any other diagnostic tests since your last visit? No  Have you been seen in the emergency room and/or had an admission to a hospital since we last saw you? No  Have you had your routine dental cleaning in the past 6 months? no    Have you activated your [employer] account? If not, what are your barriers?  No     Patient Care Team:  Juanis Mcnulty MD as PCP - General (Family Medicine)  Juanis Mcnulty MD as PCP - [facility]    Medical History Review  Past Medical, Family, and Social History reviewed and does not contribute to the patient presenting condition    Health Maintenance   Topic Date Due    Varicella Vaccine (1 of 2 - 13+ 2-dose series) 12/10/1993    Cervical cancer screen  11/10/2018    Flu vaccine (1) 11/08/2019 (Originally 9/1/2019)    DTaP/Tdap/Td vaccine (2 - Tdap) 08/13/2019    HIV screen  Completed    Pneumococcal 0-64 years Vaccine  Aged Out

## 2019-07-31 ENCOUNTER — HOSPITAL ENCOUNTER (OUTPATIENT)
Age: 39
Setting detail: SPECIMEN
Discharge: HOME | End: 2019-07-31
Payer: MEDICARE

## 2019-07-31 ENCOUNTER — OFFICE VISIT (OUTPATIENT)
Dept: OBGYN CLINIC | Age: 39
End: 2019-07-31
Payer: MEDICARE

## 2019-07-31 VITALS
OXYGEN SATURATION: 100 % | WEIGHT: 213 LBS | HEART RATE: 79 BPM | RESPIRATION RATE: 16 BRPM | HEIGHT: 66 IN | SYSTOLIC BLOOD PRESSURE: 116 MMHG | DIASTOLIC BLOOD PRESSURE: 83 MMHG | BODY MASS INDEX: 34.23 KG/M2

## 2019-07-31 DIAGNOSIS — R10.2 PELVIC PAIN: Primary | ICD-10-CM

## 2019-07-31 DIAGNOSIS — N89.8 VAGINAL ITCHING: ICD-10-CM

## 2019-07-31 DIAGNOSIS — R30.0 BURNING WITH URINATION: ICD-10-CM

## 2019-07-31 DIAGNOSIS — Z20.2 POSSIBLE EXPOSURE TO STD: ICD-10-CM

## 2019-07-31 DIAGNOSIS — N94.10 DYSPAREUNIA, FEMALE: ICD-10-CM

## 2019-07-31 DIAGNOSIS — N89.8 VAGINAL DISCHARGE: ICD-10-CM

## 2019-07-31 LAB
HIV AG/AB: NONREACTIVE
T. PALLIDUM, IGG: NONREACTIVE

## 2019-07-31 PROCEDURE — 99203 OFFICE O/P NEW LOW 30 MIN: CPT | Performed by: ADVANCED PRACTICE MIDWIFE

## 2019-07-31 ASSESSMENT — ENCOUNTER SYMPTOMS
RESPIRATORY NEGATIVE: 1
NAUSEA: 0
GASTROINTESTINAL NEGATIVE: 1
ALLERGIC/IMMUNOLOGIC NEGATIVE: 1
EYES NEGATIVE: 1
VOMITING: 0

## 2019-07-31 NOTE — PROGRESS NOTES
Subjective:   CC: pelvic pain, vaginal discharge, itching    HPI:  Reji Acosta" arrives as a new patient reporting pelvic pain for the past few months, intermittent, associated with deep thrusting during intercourse. She reports the pain following intercourse is sharp and resolves with time, and is mostly localized to the left side of her pelvis. She reports she started her menstrual cycle today, states they are usually monthly and associated with some cramping, reports that the previous 2 menstrual cycles seemed heavier with more cramps than usual, but this most previous one was not as bad. Reports she has intercourse with 1 male partner, not new in the past 3 months, requests STI screening including HIV and syphilis testing. She reports the past 2 weeks she has had burning and itching to her vagina, as well as vaginal discharge. She reports history of yeast infections and states it feels similar to that. Flex Martínez also reports some burning with urination, denies frequency. She has a history of abnormal pap in 1994 and 1997 with subsequent 2 cone biopsies. Negative pap in 2011 and 2013. Last pap 11/6/18 normal with Negative HPV. Review of Systems   Constitutional: Negative. HENT: Negative. Eyes: Negative. Respiratory: Negative. Cardiovascular: Negative. Gastrointestinal: Negative. Negative for nausea and vomiting. Endocrine: Negative. Genitourinary: Positive for dyspareunia (xfew months with deep thrusting), dysuria, pelvic pain (x few months, localizes to LLQ after intercourse and is sharp, resolves with time) and vaginal discharge. Negative for flank pain, frequency, genital sores and urgency. Menstrual problem: previous 2 cycles were heavier than usual, started menses today. Vaginal pain: burning and itching. Musculoskeletal: Negative. Skin: Negative. Allergic/Immunologic: Negative. Neurological: Negative. Hematological: Negative. Psychiatric/Behavioral: Negative.

## 2019-08-01 ENCOUNTER — TELEPHONE (OUTPATIENT)
Dept: OBGYN CLINIC | Age: 39
End: 2019-08-01

## 2019-08-01 ENCOUNTER — TELEPHONE (OUTPATIENT)
Dept: OBGYN | Age: 39
End: 2019-08-01

## 2019-08-01 DIAGNOSIS — N89.8 VAGINAL DISCHARGE: ICD-10-CM

## 2019-08-01 DIAGNOSIS — N89.8 VAGINAL ITCHING: Primary | ICD-10-CM

## 2019-08-01 LAB
CULTURE: NO GROWTH
Lab: NORMAL
SPECIMEN DESCRIPTION: NORMAL

## 2019-08-01 RX ORDER — FLUCONAZOLE 150 MG/1
150 TABLET ORAL ONCE
Qty: 1 TABLET | Refills: 0 | Status: SHIPPED | OUTPATIENT
Start: 2019-08-01 | End: 2019-08-01

## 2019-08-02 ENCOUNTER — TELEPHONE (OUTPATIENT)
Dept: OBGYN CLINIC | Age: 39
End: 2019-08-02

## 2019-08-02 LAB
C TRACH DNA GENITAL QL NAA+PROBE: NEGATIVE
DIRECT EXAM: ABNORMAL
Lab: ABNORMAL
N. GONORRHOEAE DNA: NEGATIVE
SPECIMEN DESCRIPTION: ABNORMAL
SPECIMEN DESCRIPTION: NORMAL

## 2019-08-22 ENCOUNTER — OFFICE VISIT (OUTPATIENT)
Dept: FAMILY MEDICINE CLINIC | Age: 39
End: 2019-08-22
Payer: MEDICARE

## 2019-08-22 VITALS
BODY MASS INDEX: 33.25 KG/M2 | HEART RATE: 69 BPM | OXYGEN SATURATION: 96 % | SYSTOLIC BLOOD PRESSURE: 124 MMHG | WEIGHT: 206 LBS | DIASTOLIC BLOOD PRESSURE: 78 MMHG

## 2019-08-22 DIAGNOSIS — E66.9 OBESITY (BMI 30.0-34.9): Primary | ICD-10-CM

## 2019-08-22 PROCEDURE — 1036F TOBACCO NON-USER: CPT | Performed by: FAMILY MEDICINE

## 2019-08-22 PROCEDURE — G8427 DOCREV CUR MEDS BY ELIG CLIN: HCPCS | Performed by: FAMILY MEDICINE

## 2019-08-22 PROCEDURE — G8417 CALC BMI ABV UP PARAM F/U: HCPCS | Performed by: FAMILY MEDICINE

## 2019-08-22 PROCEDURE — 99213 OFFICE O/P EST LOW 20 MIN: CPT | Performed by: FAMILY MEDICINE

## 2019-08-22 RX ORDER — PHENTERMINE HYDROCHLORIDE 37.5 MG/1
37.5 TABLET ORAL
Qty: 30 TABLET | Refills: 0 | Status: SHIPPED | OUTPATIENT
Start: 2019-08-22 | End: 2019-09-21

## 2019-08-22 NOTE — PROGRESS NOTES
(NEURONTIN) 300 MG capsule take 1 capsule by mouth three times a day 90 capsule 3    ibuprofen (ADVIL;MOTRIN) 400 MG tablet Take 2 tablets by mouth every 8 hours as needed for Pain 120 tablet 0    ibuprofen (ADVIL;MOTRIN) 600 MG tablet Take 600 mg by mouth every 6 hours as needed for Pain       No current facility-administered medications for this visit. ALLERGIES:    Allergies   Allergen Reactions    Sulfamethoxazole-Trimethoprim      dont remember RX       Social History     Tobacco Use    Smoking status: Never Smoker    Smokeless tobacco: Never Used   Substance Use Topics    Alcohol use: Yes     Alcohol/week: 0.0 standard drinks      Body mass index is 33.25 kg/m². /78   Pulse 69   Wt 206 lb (93.4 kg)   LMP 07/31/2019   SpO2 96%   BMI 33.25 kg/m²     Subjective:      HPI    46 yo female coming in today for weight check. The patient lost 7 lbs with the first prescription of Adipex. .  No pop. More water, not to many snacks. Smaller portions. Exercises: walks daily. No SE    Review of Systems   Constitutional: Negative for fever and unexpected weight change. Respiratory: Negative for cough and shortness of breath. Cardiovascular: Negative for chest pain and leg swelling. Gastrointestinal: Negative for diarrhea, constipation and blood in stool. Skin: Negative for color change and rash. Objective:   Physical Exam  Constitutional: VS (see above). General appearance: normal development, habitus and attention, no deformities. No distress. Eyes: normal conjunctiva and lids. CAV: RRR, no RMG. No edema lower extremities. Pulmo: CTA bilateral, no CWR. Skin: no rashes, lesions or ulcers. Musculoskeletal: normal gait. Nails: no clubbing or cyanosis. Psychiatric: alert and oriented to place, time and person. Normal mood and affect. Assessment:       Diagnosis Orders   1.  Obesity (BMI 30.0-34.9)  phentermine (ADIPEX-P) 37.5 MG tablet       Plan:   Second prescription of Adipex

## 2019-09-01 ENCOUNTER — HOSPITAL ENCOUNTER (EMERGENCY)
Age: 39
Discharge: HOME | End: 2019-09-02
Attending: EMERGENCY MEDICINE
Payer: MEDICARE

## 2019-09-01 DIAGNOSIS — R11.2 NON-INTRACTABLE VOMITING WITH NAUSEA, UNSPECIFIED VOMITING TYPE: ICD-10-CM

## 2019-09-01 DIAGNOSIS — R10.84 GENERALIZED ABDOMINAL PAIN: Primary | ICD-10-CM

## 2019-09-01 PROCEDURE — 84703 CHORIONIC GONADOTROPIN ASSAY: CPT

## 2019-09-01 PROCEDURE — 99284 EMERGENCY DEPT VISIT MOD MDM: CPT

## 2019-09-01 RX ORDER — ONDANSETRON 2 MG/ML
4 INJECTION INTRAMUSCULAR; INTRAVENOUS ONCE
Status: COMPLETED | OUTPATIENT
Start: 2019-09-02 | End: 2019-09-02

## 2019-09-01 RX ORDER — FENTANYL CITRATE 50 UG/ML
50 INJECTION, SOLUTION INTRAMUSCULAR; INTRAVENOUS ONCE
Status: COMPLETED | OUTPATIENT
Start: 2019-09-02 | End: 2019-09-02

## 2019-09-01 ASSESSMENT — ENCOUNTER SYMPTOMS
VOMITING: 1
NAUSEA: 1
DIARRHEA: 0
SORE THROAT: 0
SHORTNESS OF BREATH: 0
ABDOMINAL PAIN: 1

## 2019-09-01 ASSESSMENT — PAIN DESCRIPTION - DESCRIPTORS: DESCRIPTORS: BURNING

## 2019-09-01 ASSESSMENT — PAIN DESCRIPTION - ONSET: ONSET: SUDDEN

## 2019-09-01 ASSESSMENT — PAIN DESCRIPTION - FREQUENCY: FREQUENCY: CONTINUOUS

## 2019-09-01 ASSESSMENT — PAIN DESCRIPTION - PAIN TYPE: TYPE: ACUTE PAIN

## 2019-09-01 ASSESSMENT — PAIN DESCRIPTION - LOCATION: LOCATION: ABDOMEN

## 2019-09-01 ASSESSMENT — PAIN DESCRIPTION - ORIENTATION: ORIENTATION: LEFT

## 2019-09-02 ENCOUNTER — APPOINTMENT (OUTPATIENT)
Dept: CT IMAGING | Age: 39
End: 2019-09-02
Payer: MEDICARE

## 2019-09-02 VITALS
OXYGEN SATURATION: 98 % | SYSTOLIC BLOOD PRESSURE: 114 MMHG | DIASTOLIC BLOOD PRESSURE: 74 MMHG | HEART RATE: 70 BPM | TEMPERATURE: 98.2 F | RESPIRATION RATE: 16 BRPM

## 2019-09-02 LAB
-: ABNORMAL
ABSOLUTE EOS #: 0.3 K/UL (ref 0–0.44)
ABSOLUTE IMMATURE GRANULOCYTE: <0.03 K/UL (ref 0–0.3)
ABSOLUTE LYMPH #: 2.08 K/UL (ref 1.1–3.7)
ABSOLUTE MONO #: 0.45 K/UL (ref 0.1–1.2)
ALBUMIN SERPL-MCNC: 4.2 G/DL (ref 3.5–5.2)
ALBUMIN/GLOBULIN RATIO: 1.2 (ref 1–2.5)
ALP BLD-CCNC: 47 U/L (ref 35–104)
ALT SERPL-CCNC: 8 U/L (ref 5–33)
AMORPHOUS: ABNORMAL
ANION GAP SERPL CALCULATED.3IONS-SCNC: 12 MMOL/L (ref 9–17)
AST SERPL-CCNC: 18 U/L
BACTERIA: ABNORMAL
BASOPHILS # BLD: 1 % (ref 0–2)
BASOPHILS ABSOLUTE: 0.04 K/UL (ref 0–0.2)
BILIRUB SERPL-MCNC: 0.19 MG/DL (ref 0.3–1.2)
BILIRUBIN URINE: NEGATIVE
BUN BLDV-MCNC: 14 MG/DL (ref 6–20)
BUN/CREAT BLD: ABNORMAL (ref 9–20)
CALCIUM SERPL-MCNC: 8.8 MG/DL (ref 8.6–10.4)
CASTS UA: ABNORMAL /LPF (ref 0–8)
CHLORIDE BLD-SCNC: 107 MMOL/L (ref 98–107)
CO2: 20 MMOL/L (ref 20–31)
COLOR: YELLOW
CREAT SERPL-MCNC: 0.69 MG/DL (ref 0.5–0.9)
CRYSTALS, UA: ABNORMAL /HPF
DIFFERENTIAL TYPE: ABNORMAL
EOSINOPHILS RELATIVE PERCENT: 4 % (ref 1–4)
EPITHELIAL CELLS UA: ABNORMAL /HPF (ref 0–5)
GFR AFRICAN AMERICAN: >60 ML/MIN
GFR NON-AFRICAN AMERICAN: >60 ML/MIN
GFR SERPL CREATININE-BSD FRML MDRD: ABNORMAL ML/MIN/{1.73_M2}
GFR SERPL CREATININE-BSD FRML MDRD: ABNORMAL ML/MIN/{1.73_M2}
GLUCOSE BLD-MCNC: 94 MG/DL (ref 70–99)
GLUCOSE URINE: NEGATIVE
HCG QUALITATIVE: NEGATIVE
HCT VFR BLD CALC: 41.2 % (ref 36.3–47.1)
HEMOGLOBIN: 13.2 G/DL (ref 11.9–15.1)
IMMATURE GRANULOCYTES: 0 %
KETONES, URINE: ABNORMAL
LEUKOCYTE ESTERASE, URINE: NEGATIVE
LIPASE: 22 U/L (ref 13–60)
LYMPHOCYTES # BLD: 29 % (ref 24–43)
MCH RBC QN AUTO: 30.1 PG (ref 25.2–33.5)
MCHC RBC AUTO-ENTMCNC: 32 G/DL (ref 28.4–34.8)
MCV RBC AUTO: 94.1 FL (ref 82.6–102.9)
MONOCYTES # BLD: 6 % (ref 3–12)
MUCUS: ABNORMAL
NITRITE, URINE: NEGATIVE
NRBC AUTOMATED: 0 PER 100 WBC
OTHER OBSERVATIONS UA: ABNORMAL
PDW BLD-RTO: 11.5 % (ref 11.8–14.4)
PH UA: 5 (ref 5–8)
PLATELET # BLD: 280 K/UL (ref 138–453)
PLATELET ESTIMATE: ABNORMAL
PMV BLD AUTO: 10 FL (ref 8.1–13.5)
POTASSIUM SERPL-SCNC: 3.6 MMOL/L (ref 3.7–5.3)
PROTEIN UA: NEGATIVE
RBC # BLD: 4.38 M/UL (ref 3.95–5.11)
RBC # BLD: ABNORMAL 10*6/UL
RBC UA: ABNORMAL /HPF (ref 0–4)
RENAL EPITHELIAL, UA: ABNORMAL /HPF
SEG NEUTROPHILS: 60 % (ref 36–65)
SEGMENTED NEUTROPHILS ABSOLUTE COUNT: 4.42 K/UL (ref 1.5–8.1)
SODIUM BLD-SCNC: 139 MMOL/L (ref 135–144)
SPECIFIC GRAVITY UA: 1.03 (ref 1–1.03)
TOTAL PROTEIN: 7.6 G/DL (ref 6.4–8.3)
TRICHOMONAS: ABNORMAL
TURBIDITY: CLEAR
URINE HGB: ABNORMAL
UROBILINOGEN, URINE: NORMAL
WBC # BLD: 7.3 K/UL (ref 3.5–11.3)
WBC # BLD: ABNORMAL 10*3/UL
WBC UA: ABNORMAL /HPF (ref 0–5)
YEAST: ABNORMAL

## 2019-09-02 PROCEDURE — 87086 URINE CULTURE/COLONY COUNT: CPT

## 2019-09-02 PROCEDURE — 96375 TX/PRO/DX INJ NEW DRUG ADDON: CPT

## 2019-09-02 PROCEDURE — 74177 CT ABD & PELVIS W/CONTRAST: CPT

## 2019-09-02 PROCEDURE — 6370000000 HC RX 637 (ALT 250 FOR IP): Performed by: EMERGENCY MEDICINE

## 2019-09-02 PROCEDURE — 83690 ASSAY OF LIPASE: CPT

## 2019-09-02 PROCEDURE — 80053 COMPREHEN METABOLIC PANEL: CPT

## 2019-09-02 PROCEDURE — 81001 URINALYSIS AUTO W/SCOPE: CPT

## 2019-09-02 PROCEDURE — 85025 COMPLETE CBC W/AUTO DIFF WBC: CPT

## 2019-09-02 PROCEDURE — 6360000004 HC RX CONTRAST MEDICATION: Performed by: EMERGENCY MEDICINE

## 2019-09-02 PROCEDURE — 6360000002 HC RX W HCPCS: Performed by: EMERGENCY MEDICINE

## 2019-09-02 PROCEDURE — 96374 THER/PROPH/DIAG INJ IV PUSH: CPT

## 2019-09-02 RX ORDER — DICYCLOMINE HYDROCHLORIDE 10 MG/1
10 CAPSULE ORAL ONCE
Status: COMPLETED | OUTPATIENT
Start: 2019-09-02 | End: 2019-09-02

## 2019-09-02 RX ORDER — ONDANSETRON 4 MG/1
4 TABLET, ORALLY DISINTEGRATING ORAL EVERY 8 HOURS PRN
Qty: 6 TABLET | Refills: 0 | Status: SHIPPED | OUTPATIENT
Start: 2019-09-02 | End: 2020-04-02

## 2019-09-02 RX ORDER — DICYCLOMINE HYDROCHLORIDE 10 MG/1
10 CAPSULE ORAL 4 TIMES DAILY PRN
Qty: 20 CAPSULE | Refills: 0 | Status: ON HOLD | OUTPATIENT
Start: 2019-09-02 | End: 2020-07-01

## 2019-09-02 RX ORDER — ACETAMINOPHEN 500 MG
1000 TABLET ORAL EVERY 8 HOURS PRN
Qty: 30 TABLET | Refills: 0 | Status: ON HOLD | OUTPATIENT
Start: 2019-09-02 | End: 2020-07-01

## 2019-09-02 RX ADMIN — DICYCLOMINE HYDROCHLORIDE 10 MG: 10 CAPSULE ORAL at 01:43

## 2019-09-02 RX ADMIN — ONDANSETRON 4 MG: 2 INJECTION INTRAMUSCULAR; INTRAVENOUS at 00:06

## 2019-09-02 RX ADMIN — FENTANYL CITRATE 50 MCG: 50 INJECTION INTRAMUSCULAR; INTRAVENOUS at 00:07

## 2019-09-02 RX ADMIN — IOVERSOL 75 ML: 741 INJECTION INTRA-ARTERIAL; INTRAVENOUS at 01:00

## 2019-09-02 ASSESSMENT — PAIN SCALES - GENERAL: PAINLEVEL_OUTOF10: 8

## 2019-09-02 NOTE — ED PROVIDER NOTES
[facility] ED  Emergency Department Encounter  Emergency Medicine Resident     Patient Name: Sarina Eagle  MRN: [id number]  [place]: 1980  Date of evaluation: 9/1/19  PCP:  Kyler Perez MD    [address] [place] [address]       Chief Complaint   Patient presents with    Abdominal Pain       HISTORY OF PRESENT ILLNESS  (Location/Symptom, Timing/Onset, Context/Setting, Quality, Duration, Modifying Factors, Severity.)      Sarina Eagle is a 45 y.o. female who presents with a chief complaint of left-side abdominal pain that began around 10:30pm and has been constant since onset, severity is severe. Patient had 1 episode of non bloody vomiting. Patient has not been having diarrhea. No history of abdominal surgery. Patient also reports burping - she ate rotisserie chicken that was on sale from [address] [place] [address] prior to going to work. Patient took no OTC medications PTA. PAST MEDICAL / SURGICAL / SOCIAL / FAMILY HISTORY      has a past medical history of ADD (attention deficit disorder), ADHD (attention deficit hyperactivity disorder), Bipolar 1 disorder ([facility] [address] Bronchitis, Constipation, Depression, Diarrhea, Difficulty swallowing, Dizziness, Fibromyalgia, Fibromyalgia, Headache, HLD (hyperlipidemia), IBS (irritable bowel syndrome), Nausea & vomiting, Nervously anxious, SOB (shortness of breath), Thyroid disorder, Wears glasses, and Weight gain. has a past surgical history that includes other surgical history; Dilation and curettage of uterus; Tonsillectomy; pre-malignant / benign skin lesion excision (Left, 02/24/2017); and EXCISION / BIOPSY SKIN LESION OF LEG (Left, 2/24/2017).     Social History     Socioeconomic History    Marital status:      Spouse name: Not on file    Number of children: Not on file    Years of education: Not on file    Highest education level: Not on file   Occupational History    Not on file   Social Needs    Financial resource strain: Not on file    Food insecurity: NEGATIVE    Urobilinogen, Urine Normal Normal    Nitrite, Urine NEGATIVE NEGATIVE    Leukocyte Esterase, Urine NEGATIVE NEGATIVE    -          WBC, UA 0 TO 2 0 - 5 /HPF    RBC, UA 10 TO 20 0 - 4 /HPF    Casts UA  0 - 8 /LPF     2 TO 5 HYALINE Reference range defined for non-centrifuged specimen. Crystals UA NOT REPORTED None /HPF    Epithelial Cells UA 0 TO 2 0 - 5 /HPF    Renal Epithelial, Urine NOT REPORTED 0 /HPF    Bacteria, UA NOT REPORTED None    Mucus, UA NOT REPORTED None    Trichomonas, UA NOT REPORTED None    Amorphous, UA NOT REPORTED None    Other Observations UA NOT REPORTED NOT REQ.     Yeast, UA NOT REPORTED None   HCG Qualitative, Serum   Result Value Ref Range    hCG Qual NEGATIVE NEGATIVE   CBC WITH AUTO DIFFERENTIAL   Result Value Ref Range    WBC 7.3 3.5 - 11.3 k/uL    RBC 4.38 3.95 - 5.11 m/uL    Hemoglobin 13.2 11.9 - 15.1 g/dL    Hematocrit 41.2 36.3 - 47.1 %    MCV 94.1 82.6 - 102.9 fL    MCH 30.1 25.2 - 33.5 pg    MCHC 32.0 28.4 - 34.8 g/dL    RDW 11.5 (L) 11.8 - 14.4 %    Platelets 963 041 - 213 k/uL    MPV 10.0 8.1 - 13.5 fL    NRBC Automated 0.0 0.0 per 100 WBC    Differential Type NOT REPORTED     Seg Neutrophils 60 36 - 65 %    Lymphocytes 29 24 - 43 %    Monocytes 6 3 - 12 %    Eosinophils % 4 1 - 4 %    Basophils 1 0 - 2 %    Immature Granulocytes 0 0 %    Segs Absolute 4.42 1.50 - 8.10 k/uL    Absolute Lymph # 2.08 1.10 - 3.70 k/uL    Absolute Mono # 0.45 0.10 - 1.20 k/uL    Absolute Eos # 0.30 0.00 - 0.44 k/uL    Basophils Absolute 0.04 0.00 - 0.20 k/uL    Absolute Immature Granulocyte <0.03 0.00 - 0.30 k/uL    WBC Morphology NOT REPORTED     RBC Morphology NOT REPORTED     Platelet Estimate NOT REPORTED    Comprehensive Metabolic Panel   Result Value Ref Range    Glucose 94 70 - 99 mg/dL    BUN 14 6 - 20 mg/dL    CREATININE 0.69 0.50 - 0.90 mg/dL    Bun/Cre Ratio NOT REPORTED 9 - 20    Calcium 8.8 8.6 - 10.4 mg/dL    Sodium 139 135 - 144 mmol/L    Potassium 3.6 (L) 3.7 - 5.3 mmol/L    Chloride 107 98 - 107 mmol/L    CO2 20 20 - 31 mmol/L    Anion Gap 12 9 - 17 mmol/L    Alkaline Phosphatase 47 35 - 104 U/L    ALT 8 5 - 33 U/L    AST 18 <32 U/L    Total Bilirubin 0.19 (L) 0.3 - 1.2 mg/dL    Total Protein 7.6 6.4 - 8.3 g/dL    Alb 4.2 3.5 - 5.2 g/dL    Albumin/Globulin Ratio 1.2 1.0 - 2.5    GFR Non-African American >60 >60 mL/min    GFR African American >60 >60 mL/min    GFR Comment          GFR Staging NOT REPORTED    LIPASE   Result Value Ref Range    Lipase 22 13 - 60 U/L       RADIOLOGY:  Ct Abdomen Pelvis W Iv Contrast Additional Contrast? None    Result Date: 9/2/2019  EXAMINATION: CT OF THE ABDOMEN AND PELVIS WITH CONTRAST 9/2/2019 12:52 am TECHNIQUE: CT of the abdomen and pelvis was performed with the administration of intravenous contrast. Multiplanar reformatted images are provided for review. Dose modulation, iterative reconstruction, and/or weight based adjustment of the mA/kV was utilized to reduce the radiation dose to as low as reasonably achievable. COMPARISON: None. HISTORY: ORDERING SYSTEM PROVIDED HISTORY: severe abdominal pain TECHNOLOGIST PROVIDED HISTORY: Reason for Exam: DIFFUSE ABD PAIN Acuity: Unknown Type of Exam: Unknown FINDINGS: Lower Chest: Lung bases are clear Organs: Liver, gallbladder, spleen, adrenal glands, kidneys, pancreas are unremarkable. GI/Bowel: Moderate retained stool throughout the colon. No bowel obstruction. Appendix unremarkable. Pelvis: Uterus unremarkable. No adnexal mass. Bladder remarkable. Peritoneum/Retroperitoneum: No free or free fluid. Aorta is unremarkable in caliber. Bones/Soft Tissues: Mild facet arthropathy. No suspicious osseous lesions. Mild retained stool throughout the colon. No acute inflammatory process or bowel obstruction.       EKG  None    All EKG's are interpreted by the Emergency Department Physician who either signs or Co-signs this chart in the absence of a cardiologist.    DIFFERENTIAL

## 2019-09-03 LAB
CULTURE: NORMAL
Lab: NORMAL
SPECIMEN DESCRIPTION: NORMAL

## 2019-09-26 ENCOUNTER — OFFICE VISIT (OUTPATIENT)
Dept: FAMILY MEDICINE CLINIC | Age: 39
End: 2019-09-26
Payer: MEDICARE

## 2019-09-26 VITALS
SYSTOLIC BLOOD PRESSURE: 124 MMHG | WEIGHT: 193.8 LBS | BODY MASS INDEX: 31.28 KG/M2 | DIASTOLIC BLOOD PRESSURE: 82 MMHG | TEMPERATURE: 97.7 F | HEART RATE: 89 BPM | OXYGEN SATURATION: 98 %

## 2019-09-26 DIAGNOSIS — E66.9 OBESITY (BMI 30.0-34.9): Primary | ICD-10-CM

## 2019-09-26 PROCEDURE — G8427 DOCREV CUR MEDS BY ELIG CLIN: HCPCS | Performed by: FAMILY MEDICINE

## 2019-09-26 PROCEDURE — G8417 CALC BMI ABV UP PARAM F/U: HCPCS | Performed by: FAMILY MEDICINE

## 2019-09-26 PROCEDURE — 99213 OFFICE O/P EST LOW 20 MIN: CPT | Performed by: FAMILY MEDICINE

## 2019-09-26 PROCEDURE — 1036F TOBACCO NON-USER: CPT | Performed by: FAMILY MEDICINE

## 2019-09-26 RX ORDER — PHENTERMINE HYDROCHLORIDE 37.5 MG/1
37.5 TABLET ORAL
Qty: 30 TABLET | Refills: 0 | Status: SHIPPED | OUTPATIENT
Start: 2019-09-26 | End: 2019-10-26

## 2019-11-09 DIAGNOSIS — R20.0 NUMBNESS AND TINGLING IN RIGHT HAND: ICD-10-CM

## 2019-11-09 DIAGNOSIS — R20.2 NUMBNESS AND TINGLING IN RIGHT HAND: ICD-10-CM

## 2019-11-09 DIAGNOSIS — M25.531 PAIN, WRIST, RIGHT: ICD-10-CM

## 2019-11-11 RX ORDER — GABAPENTIN 300 MG/1
CAPSULE ORAL
Qty: 90 CAPSULE | Refills: 3 | Status: SHIPPED | OUTPATIENT
Start: 2019-11-11 | End: 2020-03-11

## 2019-12-02 ENCOUNTER — OFFICE VISIT (OUTPATIENT)
Dept: FAMILY MEDICINE CLINIC | Age: 39
End: 2019-12-02
Payer: MEDICARE

## 2019-12-02 VITALS
TEMPERATURE: 97.9 F | OXYGEN SATURATION: 100 % | WEIGHT: 195 LBS | SYSTOLIC BLOOD PRESSURE: 114 MMHG | BODY MASS INDEX: 31.47 KG/M2 | DIASTOLIC BLOOD PRESSURE: 72 MMHG | HEART RATE: 90 BPM

## 2019-12-02 DIAGNOSIS — J06.9 VIRAL URI: Primary | ICD-10-CM

## 2019-12-02 DIAGNOSIS — S46.919A: ICD-10-CM

## 2019-12-02 PROCEDURE — G8417 CALC BMI ABV UP PARAM F/U: HCPCS | Performed by: FAMILY MEDICINE

## 2019-12-02 PROCEDURE — 1036F TOBACCO NON-USER: CPT | Performed by: FAMILY MEDICINE

## 2019-12-02 PROCEDURE — G8428 CUR MEDS NOT DOCUMENT: HCPCS | Performed by: FAMILY MEDICINE

## 2019-12-02 PROCEDURE — 99213 OFFICE O/P EST LOW 20 MIN: CPT | Performed by: FAMILY MEDICINE

## 2019-12-02 PROCEDURE — G8484 FLU IMMUNIZE NO ADMIN: HCPCS | Performed by: FAMILY MEDICINE

## 2019-12-02 RX ORDER — CYCLOBENZAPRINE HCL 5 MG
5 TABLET ORAL EVERY 8 HOURS PRN
Qty: 30 TABLET | Refills: 1 | Status: SHIPPED | OUTPATIENT
Start: 2019-12-02 | End: 2019-12-12

## 2019-12-02 RX ORDER — AZITHROMYCIN 250 MG/1
TABLET, FILM COATED ORAL
Qty: 6 TABLET | Refills: 1 | Status: SHIPPED | OUTPATIENT
Start: 2019-12-02 | End: 2019-12-12

## 2019-12-02 RX ORDER — GUAIFENESIN 600 MG/1
600 TABLET, EXTENDED RELEASE ORAL 2 TIMES DAILY
Qty: 30 TABLET | Refills: 0 | Status: SHIPPED | OUTPATIENT
Start: 2019-12-02 | End: 2019-12-17

## 2020-02-11 ENCOUNTER — OFFICE VISIT (OUTPATIENT)
Dept: OBGYN CLINIC | Age: 40
End: 2020-02-11
Payer: MEDICARE

## 2020-02-11 ENCOUNTER — HOSPITAL ENCOUNTER (OUTPATIENT)
Age: 40
Setting detail: SPECIMEN
Discharge: HOME | End: 2020-02-11
Payer: MEDICARE

## 2020-02-11 VITALS
HEART RATE: 79 BPM | DIASTOLIC BLOOD PRESSURE: 73 MMHG | SYSTOLIC BLOOD PRESSURE: 103 MMHG | BODY MASS INDEX: 28.83 KG/M2 | HEIGHT: 66 IN | WEIGHT: 179.38 LBS

## 2020-02-11 PROBLEM — M79.7 FIBROMYALGIA: Status: ACTIVE | Noted: 2019-07-27

## 2020-02-11 PROCEDURE — G8417 CALC BMI ABV UP PARAM F/U: HCPCS | Performed by: ADVANCED PRACTICE MIDWIFE

## 2020-02-11 PROCEDURE — 1036F TOBACCO NON-USER: CPT | Performed by: ADVANCED PRACTICE MIDWIFE

## 2020-02-11 PROCEDURE — G8427 DOCREV CUR MEDS BY ELIG CLIN: HCPCS | Performed by: ADVANCED PRACTICE MIDWIFE

## 2020-02-11 PROCEDURE — G8484 FLU IMMUNIZE NO ADMIN: HCPCS | Performed by: ADVANCED PRACTICE MIDWIFE

## 2020-02-11 PROCEDURE — 99213 OFFICE O/P EST LOW 20 MIN: CPT | Performed by: ADVANCED PRACTICE MIDWIFE

## 2020-02-11 ASSESSMENT — ENCOUNTER SYMPTOMS
GASTROINTESTINAL NEGATIVE: 1
RESPIRATORY NEGATIVE: 1
EYES NEGATIVE: 1
ABDOMINAL PAIN: 0
ALLERGIC/IMMUNOLOGIC NEGATIVE: 1

## 2020-02-11 NOTE — PROGRESS NOTES
Extraocular movements intact. Neck:      Musculoskeletal: Normal range of motion. Cardiovascular:      Rate and Rhythm: Normal rate and regular rhythm. Heart sounds: Normal heart sounds. No murmur. Pulmonary:      Effort: Pulmonary effort is normal. No respiratory distress. Breath sounds: Normal breath sounds. Abdominal:      General: Abdomen is flat. Bowel sounds are normal. There is no distension. Palpations: Abdomen is soft. Tenderness: There is no abdominal tenderness. Genitourinary:     General: Normal vulva. Exam position: Supine. Labia:         Right: No lesion. Left: No lesion. Vagina: No vaginal discharge. Cervix: No cervical motion tenderness, discharge, friability or lesion. Musculoskeletal: Normal range of motion. Lymphadenopathy:      Lower Body: No right inguinal adenopathy. No left inguinal adenopathy. Skin:     General: Skin is warm and dry. Capillary Refill: Capillary refill takes less than 2 seconds. Neurological:      Mental Status: She is alert and oriented to person, place, and time. Mental status is at baseline. Psychiatric:         Mood and Affect: Mood normal.         Behavior: Behavior normal.         Thought Content: Thought content normal.         Judgment: Judgment normal.        ASSESSMENT/PLAN:  Possible STI exposure  · Await culture and lab results, treatment pending results (GC/CT and Affirm collected)  · Contraceptive counseling was done  · Safe sex practices were discussed  · Instructed to return in 4-6 weeks if she thinks there was a recent exposure of < 4 weeks  · Declines HIV, syphilis, and hepatitis screening. Encouraged to call if she changes her mind. Patient was seen with total face to face time of 15 minutes. More than 50% of this visit was on counseling and education regarding her visit diagnoses and her options.  She was also counseled on her preventative health maintenance recommendations and follow-up.     Electronically signed by: MATT Olguin CNM

## 2020-02-12 RX ORDER — METRONIDAZOLE 7.5 MG/G
GEL VAGINAL
Qty: 1 TUBE | Refills: 0 | Status: SHIPPED | OUTPATIENT
Start: 2020-02-12 | End: 2020-02-19

## 2020-03-11 RX ORDER — GABAPENTIN 300 MG/1
CAPSULE ORAL
Qty: 90 CAPSULE | Refills: 3 | Status: SHIPPED | OUTPATIENT
Start: 2020-03-11 | End: 2020-07-06

## 2020-03-23 ENCOUNTER — TELEPHONE (OUTPATIENT)
Dept: OBGYN CLINIC | Age: 40
End: 2020-03-23

## 2020-03-23 RX ORDER — FLUCONAZOLE 150 MG/1
150 TABLET ORAL ONCE
Qty: 1 TABLET | Refills: 0 | Status: SHIPPED | OUTPATIENT
Start: 2020-03-23 | End: 2020-03-27 | Stop reason: SDUPTHER

## 2020-03-27 ENCOUNTER — TELEPHONE (OUTPATIENT)
Dept: OBGYN CLINIC | Age: 40
End: 2020-03-27

## 2020-03-27 RX ORDER — FLUCONAZOLE 150 MG/1
150 TABLET ORAL ONCE
Qty: 1 TABLET | Refills: 0 | Status: SHIPPED | OUTPATIENT
Start: 2020-03-27 | End: 2020-03-27

## 2020-03-27 NOTE — TELEPHONE ENCOUNTER
I can send her 1 refill but if no relief from that we may need to have a MyChart visit to discuss further. Please pend 1 tablet of diflican (770 mg) to be taken once (with no refills). I'm not at a computer so I think I can approve it if you pend it for me.     Thanks

## 2020-03-27 NOTE — TELEPHONE ENCOUNTER
Pt call office states the yeast still bother her pt asking for refill or if is any other med.  That she can take

## 2020-03-30 ENCOUNTER — TELEPHONE (OUTPATIENT)
Dept: OBGYN CLINIC | Age: 40
End: 2020-03-30

## 2020-04-01 ENCOUNTER — HOSPITAL ENCOUNTER (OUTPATIENT)
Age: 40
Setting detail: SPECIMEN
Discharge: HOME | End: 2020-04-01
Payer: MEDICARE

## 2020-04-01 ENCOUNTER — OFFICE VISIT (OUTPATIENT)
Dept: OBGYN CLINIC | Age: 40
End: 2020-04-01
Payer: MEDICARE

## 2020-04-01 LAB
BILIRUBIN, POC: NEGATIVE
BLOOD URINE, POC: NEGATIVE
CLARITY, POC: CLEAR
COLOR, POC: YELLOW
CONTROL: PRESENT
GLUCOSE URINE, POC: NORMAL
KETONES, POC: NEGATIVE
LEUKOCYTE EST, POC: NEGATIVE
NITRITE, POC: NEGATIVE
PH, POC: 6
PREGNANCY TEST URINE, POC: NEGATIVE
PROTEIN, POC: NEGATIVE
SPECIFIC GRAVITY, POC: 1.01
UROBILINOGEN, POC: NORMAL

## 2020-04-01 PROCEDURE — 99213 OFFICE O/P EST LOW 20 MIN: CPT | Performed by: ADVANCED PRACTICE MIDWIFE

## 2020-04-01 PROCEDURE — G8417 CALC BMI ABV UP PARAM F/U: HCPCS | Performed by: ADVANCED PRACTICE MIDWIFE

## 2020-04-01 PROCEDURE — 81002 URINALYSIS NONAUTO W/O SCOPE: CPT | Performed by: ADVANCED PRACTICE MIDWIFE

## 2020-04-01 PROCEDURE — 1036F TOBACCO NON-USER: CPT | Performed by: ADVANCED PRACTICE MIDWIFE

## 2020-04-01 PROCEDURE — G8427 DOCREV CUR MEDS BY ELIG CLIN: HCPCS | Performed by: ADVANCED PRACTICE MIDWIFE

## 2020-04-01 PROCEDURE — 81025 URINE PREGNANCY TEST: CPT | Performed by: ADVANCED PRACTICE MIDWIFE

## 2020-04-02 VITALS
SYSTOLIC BLOOD PRESSURE: 114 MMHG | HEART RATE: 81 BPM | HEIGHT: 66 IN | WEIGHT: 187.5 LBS | BODY MASS INDEX: 30.13 KG/M2 | DIASTOLIC BLOOD PRESSURE: 75 MMHG

## 2020-04-02 LAB
C TRACH DNA GENITAL QL NAA+PROBE: NEGATIVE
CULTURE: ABNORMAL
DIRECT EXAM: NORMAL
Lab: ABNORMAL
Lab: NORMAL
N. GONORRHOEAE DNA: NEGATIVE
SPECIMEN DESCRIPTION: ABNORMAL
SPECIMEN DESCRIPTION: NORMAL
SPECIMEN DESCRIPTION: NORMAL

## 2020-04-02 RX ORDER — AMOXICILLIN 500 MG/1
500 CAPSULE ORAL 2 TIMES DAILY
Qty: 14 CAPSULE | Refills: 0 | Status: SHIPPED | OUTPATIENT
Start: 2020-04-02 | End: 2020-04-09

## 2020-04-02 ASSESSMENT — ENCOUNTER SYMPTOMS
RESPIRATORY NEGATIVE: 1
EYES NEGATIVE: 1
GASTROINTESTINAL NEGATIVE: 1
ALLERGIC/IMMUNOLOGIC NEGATIVE: 1

## 2020-05-06 ENCOUNTER — TELEMEDICINE (OUTPATIENT)
Dept: FAMILY MEDICINE CLINIC | Age: 40
End: 2020-05-06
Payer: MEDICARE

## 2020-05-06 VITALS — BODY MASS INDEX: 31.15 KG/M2 | WEIGHT: 193 LBS

## 2020-05-06 PROCEDURE — 99213 OFFICE O/P EST LOW 20 MIN: CPT | Performed by: FAMILY MEDICINE

## 2020-05-06 PROCEDURE — G8427 DOCREV CUR MEDS BY ELIG CLIN: HCPCS | Performed by: FAMILY MEDICINE

## 2020-05-06 RX ORDER — PHENTERMINE HYDROCHLORIDE 37.5 MG/1
37.5 TABLET ORAL
Qty: 30 TABLET | Refills: 0 | Status: SHIPPED | OUTPATIENT
Start: 2020-05-06 | End: 2020-06-05

## 2020-05-06 ASSESSMENT — PATIENT HEALTH QUESTIONNAIRE - PHQ9
2. FEELING DOWN, DEPRESSED OR HOPELESS: 0
1. LITTLE INTEREST OR PLEASURE IN DOING THINGS: 0
SUM OF ALL RESPONSES TO PHQ9 QUESTIONS 1 & 2: 0
SUM OF ALL RESPONSES TO PHQ QUESTIONS 1-9: 0
SUM OF ALL RESPONSES TO PHQ QUESTIONS 1-9: 0

## 2020-05-06 NOTE — PROGRESS NOTES
General FM note    Magan Persaud is a 44 y.o. female who presents today for follow up on her  medical conditions as noted below. Magan Persaud is c/o of   Chief Complaint   Patient presents with    Obesity       Patient Active Problem List:     Suicide attempt [facility])     Bipolar 1 disorder ([address]     Postnasal drip     Marijuana abuse     Obesity (BMI 30.0-34. 9)     Closed nondisplaced oblique fracture of shaft of left ulna     Closed nondisplaced oblique fracture of shaft of ulna with routine healing     Closed fracture of lower end of right radius with routine healing     Fibromyalgia     Past Medical History:   Diagnosis Date    ADD (attention deficit disorder)     ADHD (attention deficit hyperactivity disorder)     Bipolar 1 disorder (HCC)     Bronchitis     Constipation     Depression     Diarrhea     Difficulty swallowing     Dizziness     Fibromyalgia     Fibromyalgia     Headache     HLD (hyperlipidemia)     IBS (irritable bowel syndrome)     Nausea & vomiting     Nervously anxious     SOB (shortness of breath)     Thyroid disorder     Wears glasses     Weight gain       Past Surgical History:   Procedure Laterality Date    DILATION AND CURETTAGE OF UTERUS      EXCISION / BIOPSY SKIN LESION OF LEG Left 2/24/2017    EXCISION MEDIAL THIGH LESION performed by Roberto Ovalles IV, DO at [address] [place] [address]      2 cone biopsys    PRE-MALIGNANT / BENIGN SKIN LESION EXCISION Left 02/24/2017    inner thigh     TONSILLECTOMY      adenoids     Family History   Problem Relation Age of Onset    Hypertension Mother     Diabetes Father     Brain Cancer Maternal Grandmother     Heart Defect Maternal Grandfather      Current Outpatient Medications   Medication Sig Dispense Refill    phentermine (ADIPEX-P) 37.5 MG tablet Take 1 tablet by mouth every morning (before breakfast) for 30 days.  30 tablet 0    gabapentin (NEURONTIN) 300 MG capsule take 1 capsule by mouth three times a day 90 capsule 3    dicyclomine (BENTYL) 10 MG capsule Take 1 capsule by mouth 4 times daily as needed (abdominal cramping) 20 capsule 0    acetaminophen (TYLENOL) 500 MG tablet Take 2 tablets by mouth every 8 hours as needed for Pain 30 tablet 0    lidocaine (XYLOCAINE) 5 % ointment Apply topically as needed. 240 g 1    ibuprofen (ADVIL;MOTRIN) 400 MG tablet Take 2 tablets by mouth every 8 hours as needed for Pain 120 tablet 0    ibuprofen (ADVIL;MOTRIN) 600 MG tablet Take 600 mg by mouth every 6 hours as needed for Pain       No current facility-administered medications for this visit. ALLERGIES:    Allergies   Allergen Reactions    Sulfamethoxazole-Trimethoprim      dont remember RX       Social History     Tobacco Use    Smoking status: Never Smoker    Smokeless tobacco: Never Used   Substance Use Topics    Alcohol use: Yes     Alcohol/week: 0.0 standard drinks      Body mass index is 31.15 kg/m². Wt 193 lb (87.5 kg) Comment: self reported  BMI 31.15 kg/m²     Subjective:      HPI    45 yo female reaching out per tele med visit today. Gained a lot of weight since being at home. Would like to restart adipex. Diet: ok . Could do better. Exercises: not too much but now the weather is getting better she will walk more. No SE from before. Review of Systems   Constitutional: Negative for fever and unexpected weight change. Respiratory: Negative for cough and shortness of breath. Cardiovascular: Negative for chest pain and leg swelling. Musculoskeletal: Negative for back pain and gait problem. Skin: Negative for color change and rash. Objective:   Physical Exam  Physical Exam  General appearance: normal development, habitus and attention, no deformities. No distress. Pulmo: normal breathing pattern. Psychiatric: alert and oriented to place, time and person. Normal mood and affect. Assessment:       Diagnosis Orders   1.  Class 1 obesity due to excess calories without serious

## 2020-05-18 ENCOUNTER — TELEPHONE (OUTPATIENT)
Dept: OBGYN CLINIC | Age: 40
End: 2020-05-18

## 2020-06-08 ENCOUNTER — TELEMEDICINE (OUTPATIENT)
Dept: FAMILY MEDICINE CLINIC | Age: 40
End: 2020-06-08
Payer: MEDICARE

## 2020-06-08 PROCEDURE — 99213 OFFICE O/P EST LOW 20 MIN: CPT | Performed by: FAMILY MEDICINE

## 2020-06-08 PROCEDURE — G8427 DOCREV CUR MEDS BY ELIG CLIN: HCPCS | Performed by: FAMILY MEDICINE

## 2020-06-08 RX ORDER — PHENTERMINE HYDROCHLORIDE 37.5 MG/1
37.5 TABLET ORAL
Qty: 30 TABLET | Refills: 0 | Status: ON HOLD | OUTPATIENT
Start: 2020-06-08 | End: 2020-07-03 | Stop reason: HOSPADM

## 2020-06-08 NOTE — PROGRESS NOTES
dicyclomine (BENTYL) 10 MG capsule Take 1 capsule by mouth 4 times daily as needed (abdominal cramping) 20 capsule 0    acetaminophen (TYLENOL) 500 MG tablet Take 2 tablets by mouth every 8 hours as needed for Pain 30 tablet 0    lidocaine (XYLOCAINE) 5 % ointment Apply topically as needed. 240 g 1    ibuprofen (ADVIL;MOTRIN) 400 MG tablet Take 2 tablets by mouth every 8 hours as needed for Pain 120 tablet 0    ibuprofen (ADVIL;MOTRIN) 600 MG tablet Take 600 mg by mouth every 6 hours as needed for Pain       No current facility-administered medications for this visit. ALLERGIES:    Allergies   Allergen Reactions    Sulfamethoxazole-Trimethoprim      dont remember RX       Social History     Tobacco Use    Smoking status: Never Smoker    Smokeless tobacco: Never Used   Substance Use Topics    Alcohol use: Yes     Alcohol/week: 0.0 standard drinks      There is no height or weight on file to calculate BMI. There were no vitals taken for this visit. Subjective:      HPI    45 yo female reaching out today for telemedicine visit. Current weight 188 lbs lost 4 lbs with the first prescription of Adipex. Diet: daughter is gone. Not eating too healthy, no pop. Exercises: not too good with it. She is outside in the garden working there a lot she also took a walk yesterday but she does not exercise on a daily basis. Review of Systems   Constitutional: Negative for fever and unexpected weight change. Respiratory: Negative for cough and shortness of breath. Cardiovascular: Negative for chest pain and leg swelling. Gastrointestinal: Negative for diarrhea, constipation and blood in stool. Musculoskeletal: Negative for back pain and gait problem. Skin: Negative for color change and rash. Objective:   Physical Exam    General appearance: normal development, habitus and attention, no deformities. No distress. Pulmo: normal breathing pattern.   Psychiatric: alert and oriented to place, time and Jeniffer Lopez MD on 6/8/2020 at 2:00 PM    An electronic signature was used to authenticate this note. Return in about 4 weeks (around 7/6/2020), or if symptoms worsen or fail to improve, for weight. No orders of the defined types were placed in this encounter. Orders Placed This Encounter   Medications    phentermine (ADIPEX-P) 37.5 MG tablet     Sig: Take 1 tablet by mouth every morning (before breakfast) for 30 days. Dispense:  30 tablet     Refill:  0       Rosemarie received counseling on the following healthy behaviors: nutrition, exercise and medication adherence  Reviewed prior labs and health maintenance. Continue current medications, diet and exercise. Discussed use, benefit, and side effects of prescribed medications. Barriers to medication compliance addressed. Patient given educational materials - see patient instructions. All patient questions answered. Patient voiced understanding.       Electronically signed by Dorita Sifuentes MD on 6/8/2020 at 1:59 PM       (Please note that portions of this note were completed with a voice recognition program. Efforts were made to edit the dictations but occasionally words are mis-transcribed.)

## 2020-06-30 ENCOUNTER — HOSPITAL ENCOUNTER (INPATIENT)
Age: 40
LOS: 2 days | Discharge: HOME | DRG: 753 | End: 2020-07-03
Attending: EMERGENCY MEDICINE | Admitting: PSYCHIATRY & NEUROLOGY
Payer: MEDICARE

## 2020-06-30 PROCEDURE — 99285 EMERGENCY DEPT VISIT HI MDM: CPT

## 2020-07-01 PROBLEM — F32.A DEPRESSION WITH SUICIDAL IDEATION: Status: ACTIVE | Noted: 2020-07-01

## 2020-07-01 PROBLEM — F31.81 BIPOLAR II DISORDER (HCC): Status: ACTIVE | Noted: 2020-07-01

## 2020-07-01 PROBLEM — F19.10 POLYSUBSTANCE ABUSE (HCC): Status: ACTIVE | Noted: 2020-07-01

## 2020-07-01 PROBLEM — R45.851 DEPRESSION WITH SUICIDAL IDEATION: Status: ACTIVE | Noted: 2020-07-01

## 2020-07-01 LAB
SARS-COV-2, PCR: NORMAL
SARS-COV-2, RAPID: NOT DETECTED
SARS-COV-2: NORMAL
SOURCE: NORMAL

## 2020-07-01 PROCEDURE — 90792 PSYCH DIAG EVAL W/MED SRVCS: CPT | Performed by: NURSE PRACTITIONER

## 2020-07-01 PROCEDURE — 1240000000 HC EMOTIONAL WELLNESS R&B

## 2020-07-01 PROCEDURE — 6370000000 HC RX 637 (ALT 250 FOR IP): Performed by: NURSE PRACTITIONER

## 2020-07-01 PROCEDURE — U0002 COVID-19 LAB TEST NON-CDC: HCPCS

## 2020-07-01 PROCEDURE — 6370000000 HC RX 637 (ALT 250 FOR IP): Performed by: PSYCHIATRY & NEUROLOGY

## 2020-07-01 RX ORDER — GABAPENTIN 300 MG/1
300 CAPSULE ORAL 3 TIMES DAILY
Status: DISCONTINUED | OUTPATIENT
Start: 2020-07-01 | End: 2020-07-03 | Stop reason: HOSPADM

## 2020-07-01 RX ORDER — ACETAMINOPHEN 325 MG/1
650 TABLET ORAL EVERY 4 HOURS PRN
Status: DISCONTINUED | OUTPATIENT
Start: 2020-07-01 | End: 2020-07-03 | Stop reason: HOSPADM

## 2020-07-01 RX ORDER — TRAZODONE HYDROCHLORIDE 50 MG/1
50 TABLET ORAL NIGHTLY PRN
Status: DISCONTINUED | OUTPATIENT
Start: 2020-07-01 | End: 2020-07-03 | Stop reason: HOSPADM

## 2020-07-01 RX ORDER — ARIPIPRAZOLE 5 MG/1
5 TABLET ORAL DAILY
Status: DISCONTINUED | OUTPATIENT
Start: 2020-07-01 | End: 2020-07-02

## 2020-07-01 RX ORDER — NICOTINE 21 MG/24HR
1 PATCH, TRANSDERMAL 24 HOURS TRANSDERMAL DAILY
Status: DISCONTINUED | OUTPATIENT
Start: 2020-07-01 | End: 2020-07-02

## 2020-07-01 RX ORDER — HYDROXYZINE HYDROCHLORIDE 25 MG/1
25 TABLET, FILM COATED ORAL 3 TIMES DAILY PRN
Status: DISCONTINUED | OUTPATIENT
Start: 2020-07-01 | End: 2020-07-03 | Stop reason: HOSPADM

## 2020-07-01 RX ORDER — MAGNESIUM HYDROXIDE/ALUMINUM HYDROXICE/SIMETHICONE 120; 1200; 1200 MG/30ML; MG/30ML; MG/30ML
30 SUSPENSION ORAL EVERY 6 HOURS PRN
Status: DISCONTINUED | OUTPATIENT
Start: 2020-07-01 | End: 2020-07-03 | Stop reason: HOSPADM

## 2020-07-01 RX ADMIN — TRAZODONE HYDROCHLORIDE 50 MG: 50 TABLET ORAL at 21:11

## 2020-07-01 RX ADMIN — GABAPENTIN 300 MG: 300 CAPSULE ORAL at 21:11

## 2020-07-01 RX ADMIN — ARIPIPRAZOLE 5 MG: 5 TABLET ORAL at 18:34

## 2020-07-01 ASSESSMENT — SLEEP AND FATIGUE QUESTIONNAIRES
DO YOU HAVE DIFFICULTY SLEEPING: NO
DO YOU USE A SLEEP AID: NO
DO YOU HAVE DIFFICULTY SLEEPING: NO
AVERAGE NUMBER OF SLEEP HOURS: 15
AVERAGE NUMBER OF SLEEP HOURS: 5
DO YOU USE A SLEEP AID: NO

## 2020-07-01 ASSESSMENT — PATIENT HEALTH QUESTIONNAIRE - PHQ9: SUM OF ALL RESPONSES TO PHQ QUESTIONS 1-9: 7

## 2020-07-01 ASSESSMENT — LIFESTYLE VARIABLES
HISTORY_ALCOHOL_USE: YES
HISTORY_ALCOHOL_USE: NO
HISTORY_ALCOHOL_USE: NO

## 2020-07-01 ASSESSMENT — PAIN SCALES - GENERAL: PAINLEVEL_OUTOF10: 0

## 2020-07-01 NOTE — PLAN OF CARE
Problem: Altered Mood, Psychotic Behavior:  Goal: Absence of self-harm  Description: Absence of self-harm  7/1/2020 1844 by Jose Carlos Durham RN  Outcome: Ongoing  Note: Ms. Londa Romberg denies suicidal ideation. She continues to have fleeting thoughts to harm her boyfriend. She is calm and cooperative when awake. Ms. Londa Romberg was isolative to her room today. She has not attended groups. She is medication compliant. Safety checks are completed every fifteen minutes at minimum.
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
## 2020-07-01 NOTE — BH NOTE
[facility]  Admission Note     Admission Type:   Admission Type: Voluntary    Reason for admission:  Reason for Admission: homicidal thoughts    PATIENT STRENGTHS:  Strengths: Positive Support    Patient Strengths and Limitations:  Limitations: Difficult relationships / poor social skills    Addictive Behavior:   Addictive Behavior  In the past 3 months, have you felt or has someone told you that you have a problem with:  : None  Do you have a history of Chemical Use?: Yes(crack/cocaine)  Do you have a history of Alcohol Use?: No  Do you have a history of Street Drug Abuse?: Yes(crack/cocaine)  Histroy of Prescripton Drug Abuse?: No    Medical Problems:   Past Medical History:   Diagnosis Date    ADD (attention deficit disorder)     ADHD (attention deficit hyperactivity disorder)     Bipolar 1 disorder (HCC)     Bronchitis     Constipation     Depression     Diarrhea     Difficulty swallowing     Dizziness     Fibromyalgia     Fibromyalgia     Headache     HLD (hyperlipidemia)     IBS (irritable bowel syndrome)     Nausea & vomiting     Nervously anxious     SOB (shortness of breath)     Thyroid disorder     Wears glasses     Weight gain        Status EXAM:  Status and Exam  Normal: Yes  Facial Expression: Flat  Affect: Appropriate  Level of Consciousness: Alert  Mood:Normal: No  Mood: Irritable  Motor Activity:Normal: Yes  Motor Activity: Decreased  Interview Behavior: Cooperative  Preception: [place] to Person, [place] to Time, [place] to Place, [place] to Situation  Attention:Normal: Yes  Attention: Distractible  Thought Processes: Blocking, Circumstantial  Thought Content:Normal: No  Thought Content: Preoccupations  Hallucinations: None  Delusions: No  Memory:Normal: Yes  Insight and Judgment: No  Insight and Judgment: Poor Judgment, Poor Insight  Present Suicidal Ideation: No  Present Homicidal Ideation: Yes    Tobacco Screening:  Practical Counseling, on admission, thuy X, if applicable and completed (first 3 are required if patient doesn't refuse):            ( )  Recognizing danger situations (included triggers and roadblocks)                    ( )  Coping skills (new ways to manage stress, exercise, relaxation techniques, changing routine, distraction)                                                           ( )  Basic information about quitting (benefits of quitting, techniques in how to quit, available resources  ( ) Referral for counseling faxed to [facility]                                           ( x) Patient refused counseling  ( ) Patient has not smoked in the last 30 days    Metabolic Screening:    No results found for: LABA1C    No results for input(s): CHOL, TRIG, HDL, LDLCALC, LABVLDL in the last 72 hours. Body mass index is 30.02 kg/m². BP Readings from Last 2 Encounters:   07/01/20 (!) 125/50   04/01/20 114/75           Pt admitted with followings belongings:  Dentures: None  Vision - Corrective Lenses: Contacts  Hearing Aid: None  Jewelry: None  Body Piercings Removed: N/A  Clothing: Shirt, Pants, Footwear, Socks, Undergarments (Comment), Dress  Were All Patient Medications Collected?: Not Applicable  Other Valuables: Cell phone    Valuables placed in safe in security envelope, number:  [id number]. Patient's home medications were reviewed. Patient oriented to surroundings and program expectations and copy of patient rights given. Received admission packet:  yes. Consents reviewed, signed yes. Patient verbalize understanding:  yes.     Patient education on precautions: yes                   Faraz Rosas RN

## 2020-07-01 NOTE — GROUP NOTE
Group Therapy Note    Date: 7/1/2020    Group Start Time: 9153  Group End Time: 0915  Group Topic: Community Meeting    [address] [place] [address] Vanna        Group Therapy Note    Attendees: 8/22    patient refused to attend community meeting and goal setting group at [phone] after encouragement from staff. 1:1 talk time provided as alternative to group session.        Signature:  Vanna Llanes

## 2020-07-01 NOTE — ED PROVIDER NOTES
EMERGENCY DEPARTMENT ENCOUNTER    Pt Name: Junior Hernandez  MRN: [id number]  [place] 1980  Date of evaluation: 6/30/20  CHIEF COMPLAINT       Chief Complaint   Patient presents with    Mental Health Problem     HISTORY OF PRESENT ILLNESS   HPI    HISTORY OF PRESENT ILLNESS:  Past medical history of fibromyalgia and bipolar presents for chief complaint of depression. Patient states she has been feeling more depressed over the past couple days. Severity is moderate. No aggravating or relieving factors. Timing is couple days. Course constant.   Context is depression   -----------------------  -----------------------  REVIEW OF SYSTEMS  *see ED Caveat  ED Caveat: [none]  Gen:  No fever  CV: No CP, no palpitations  Resp: No SOB, no respiratory distress  GI: No V/D, no abd pain  : No dysuria, no increased frequency  Skin: No rash, no purulent lesions  Eyes: No blurry vision, No double vision  MSK: No back pain, no joint pain  Neuro: No HA, no sensation changes  Psych: No SI/HI  -----------------------  -----------------------  ALLERGIES  -per nursing records, reviewed    PAST MEDICAL HISTORY  -See HPI    SOCIAL HISTORY  -No daily drinking, no IV drugs  -----------------------  -----------------------  PHYSICAL EXAM  Gen: no acute distress  Skin: no rashes  Head: Normocephalic, atraumatic  Neck: no nuchal rigidity  Eye: PERRLA, normal conjunctiva  ENT: Mucous membranes moist  CV: Normal rate  Resp: Respirations unlabored  MSK: no large joint effusions  ABD: Non distended  Neuro: Alert and oriented, no focal neurological deficits observed  Psych: Cooperative  -----------------------  -----------------------  MEDICAL DECISION MAKING  Differential Diagnosis:  - Consideration is given for    anti-and MDA receptor encephalitis, intoxication, sepsis, meningitis, pneumonia, uti, cellulitis, medication overdose, subarachnoid hemorrhage, hypoglycemia, electrolyte abnormality, ACS, CVA, withdrawl, cancer, rhabdo, thyroid disorder,  -  #Impression/Plan:  - Clinically patient's presentation is most consistent with depression. Will be evaluated by social work. -   -----------------------  -----------------------  Nancy Menjivar MD, ALOTN  Emergency Medicine Attending  Questions? Please contact my cell phone anytime. [phone]  *This charting supersedes any ED resident or staff charting and was written using speech recognition software      ## The patient was evaluated during the global COVID-19 pandemic, and that diagnosis was suspected/considered upon their initial presentation.       PASTMEDICAL HISTORY     Past Medical History:   Diagnosis Date    ADD (attention deficit disorder)     ADHD (attention deficit hyperactivity disorder)     Bipolar 1 disorder (HCC)     Bronchitis     Constipation     Depression     Diarrhea     Difficulty swallowing     Dizziness     Fibromyalgia     Fibromyalgia     Headache     HLD (hyperlipidemia)     IBS (irritable bowel syndrome)     Nausea & vomiting     Nervously anxious     SOB (shortness of breath)     Thyroid disorder     Wears glasses     Weight gain      SURGICAL HISTORY       Past Surgical History:   Procedure Laterality Date    DILATION AND CURETTAGE OF UTERUS      EXCISION / BIOPSY SKIN LESION OF LEG Left 2/24/2017    EXCISION MEDIAL THIGH LESION performed by Chiqui Ovalles IV, DO at [address]      2 cone biopsys    PRE-MALIGNANT / BENIGN SKIN LESION EXCISION Left 02/24/2017    inner thigh     TONSILLECTOMY      adenoids     CURRENT MEDICATIONS       Previous Medications    ACETAMINOPHEN (TYLENOL) 500 MG TABLET    Take 2 tablets by mouth every 8 hours as needed for Pain    DICYCLOMINE (BENTYL) 10 MG CAPSULE    Take 1 capsule by mouth 4 times daily as needed (abdominal cramping)    GABAPENTIN (NEURONTIN) 300 MG CAPSULE    take 1 capsule by mouth three times a day    IBUPROFEN (ADVIL;MOTRIN) 400 MG TABLET    Take 2 tablets by mouth [address]          DISCHARGE MEDICATIONS:  New Prescriptions    No medications on file     Cyndie Huertas MD  Attending Emergency Physician                    Jerad Kruger MD  07/01/20 1263

## 2020-07-01 NOTE — GROUP NOTE
Group Therapy Note    Date: 7/1/2020    Group Start Time: 1600  Group End Time: 7420  Group Topic: Healthy Living/Wellness    LEANDRO CHRIS Morales        Group Therapy Note    Attendees: 11/20         Patient's Goal:  Identify 1 positive from video    Notes:  REGLA talk \" Stop being a bystander in your own life\"    Status After Intervention:  Unchanged    Participation Level: Active Listener and Interactive    Participation Quality: Appropriate and Attentive      Speech:  normal      Thought Process/Content: Logical      Affective Functioning: Congruent      Mood: stable      Level of consciousness:  Alert and Attentive      Response to Learning: Progressing to goal      Endings: None Reported    Modes of Intervention: Education and Media      Discipline Responsible: [employer]      Signature:   Varghese Morales

## 2020-07-01 NOTE — PROGRESS NOTES
Pharmacy Medication History Note      List of current medications patient is taking is complete. Source of information: [employer] [facility]), OAS    Changes made to medication list:  Medications removed (include reason, ex. therapy complete or physician discontinued, noncompliance): Ibuprofen (list clean up), Acetaminophen (list clean up), Dicyclomine (list clean up)    Medications added/doses adjusted:  None      Please let me know if you have any questions about this encounter. Thank you!     Electronically signed by AKHIL Morales [facility] on 7/1/2020 at 9:05 AM

## 2020-07-01 NOTE — GROUP NOTE
Group Therapy Note    Date: 7/1/2020    Group Start Time: 1100  Group End Time: 1150  Group Topic: Cognitive Skills    RENETTA Bernard, CTRS        Group Therapy Note    Attendees: 9/20         Pt did not participate in Cognitive Skills Group at 1100AM when encouraged by RT due to resting in room. Pt was offered talk time as an alternative to group but declined.          Discipline Responsible: Psychoeducational Specialist        Signature:  Lorenzo Harris
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
## 2020-07-01 NOTE — PLAN OF CARE
[facility]  Initial Interdisciplinary Treatment Plan NO      Original treatment plan Date & Time: 7/1/2020                  729AM    Admission Type:  Admission Type: Involuntary    Reason for admission:   Reason for Admission: SI no plan    Estimated Length of Stay:  5-7days  Estimated Discharge Date: to be determined by physician    PATIENT STRENGTHS:  Patient Strengths:Strengths: Positive Support, No significant Physical Illness  Patient Strengths and Limitations:Limitations: Tendency to isolate self, Lacks leisure interests, Difficulty problem solving/relies on others to help solve problems, Hopeless about future, Multiple barriers to leisure interests, Inappropriate/potentially harmful leisure interests (depression substance abuse anxiety poor coping skills)  Addictive Behavior: Addictive Behavior  In the past 3 months, have you felt or has someone told you that you have a problem with:  : None  Do you have a history of Chemical Use?: No  Do you have a history of Alcohol Use?: No  Do you have a history of Street Drug Abuse?: Yes  Histroy of Prescripton Drug Abuse?: No  Medical Problems:No past medical history on file.   Status EXAM:Status and Exam  Normal: No  Facial Expression: Elevated  Affect: Inappropriate  Level of Consciousness: Alert  Mood:Normal: No  Mood: Depressed, Anxious  Motor Activity:Normal: No  Motor Activity: Decreased  Interview Behavior: Cooperative  Preception: [place] to Person, [place]la [place]aug[place] to Time, [place] to Place, [place] to Situation  Attention:Normal: Yes  Attention: Distractible  Thought Processes: Circumstantial  Thought Content:Normal: Yes  Thought Content: Preoccupations  Hallucinations: None  Delusions: No  Memory:Normal: Yes  Memory: Poor Recent, Confabulation  Insight and Judgment: No  Insight and Judgment: Unmotivated  Present Suicidal Ideation: No  Present Homicidal Ideation: No    EDUCATION:   Learner Progress Toward Treatment Goals: reviewed group plans and strategies for care    Method:group therapy, medication compliance, individualized assessments and care planning    Outcome: needs reinforcement    PATIENT GOALS: to be discussed with patient within 72 hours    PLAN/TREATMENT RECOMMENDATIONS:     continue group therapy , medications compliance, goal setting, individualized assessments and care, continue to monitor pt on unit      SHORT-TERM GOALS:   Time frame for Short-Term Goals: 5-7 days    LONG-TERM GOALS:  Time frame for Long-Term Goals: 6 months  Members Present in Team Meeting: See Signature Sheet    Yocasta Lopez

## 2020-07-01 NOTE — GROUP NOTE
Group Therapy Note    Date: 7/1/2020    Group Start Time: 1000  Group End Time: 1030  Group Topic: Psychotherapy    RENETTA Juarez        Group Therapy Note       Patient refused to attend psychotherapy group after encouragement from staff. 1:1 talk time offered and PT accepted on this date. Signature:   Choco Juarez

## 2020-07-02 PROBLEM — K59.00 CONSTIPATION: Status: ACTIVE | Noted: 2020-07-02

## 2020-07-02 PROBLEM — R10.9 RIGHT SIDED ABDOMINAL PAIN: Status: ACTIVE | Noted: 2020-07-02

## 2020-07-02 LAB
ABSOLUTE EOS #: 0.3 K/UL (ref 0–0.4)
ABSOLUTE IMMATURE GRANULOCYTE: ABNORMAL K/UL (ref 0–0.3)
ABSOLUTE LYMPH #: 2.4 K/UL (ref 1–4.8)
ABSOLUTE MONO #: 0.4 K/UL (ref 0.1–1.3)
ALBUMIN SERPL-MCNC: 3.7 G/DL (ref 3.5–5.2)
ALBUMIN/GLOBULIN RATIO: ABNORMAL (ref 1–2.5)
ALP BLD-CCNC: 36 U/L (ref 35–104)
ALT SERPL-CCNC: 13 U/L (ref 5–33)
AMPHETAMINE SCREEN URINE: NEGATIVE
ANION GAP SERPL CALCULATED.3IONS-SCNC: 7 MMOL/L (ref 9–17)
AST SERPL-CCNC: 13 U/L
BARBITURATE SCREEN URINE: NEGATIVE
BASOPHILS # BLD: 0 % (ref 0–2)
BASOPHILS ABSOLUTE: 0 K/UL (ref 0–0.2)
BENZODIAZEPINE SCREEN, URINE: NEGATIVE
BILIRUB SERPL-MCNC: 0.16 MG/DL (ref 0.3–1.2)
BUN BLDV-MCNC: 9 MG/DL (ref 6–20)
BUN/CREAT BLD: ABNORMAL (ref 9–20)
BUPRENORPHINE URINE: ABNORMAL
CALCIUM SERPL-MCNC: 8.7 MG/DL (ref 8.6–10.4)
CANNABINOID SCREEN URINE: POSITIVE
CHLORIDE BLD-SCNC: 110 MMOL/L (ref 98–107)
CHOLESTEROL/HDL RATIO: 4.5
CHOLESTEROL: 157 MG/DL
CO2: 25 MMOL/L (ref 20–31)
COCAINE METABOLITE, URINE: NEGATIVE
CREAT SERPL-MCNC: 0.78 MG/DL (ref 0.5–0.9)
DIFFERENTIAL TYPE: ABNORMAL
EOSINOPHILS RELATIVE PERCENT: 6 % (ref 0–4)
ESTIMATED AVERAGE GLUCOSE: 94 MG/DL
GFR AFRICAN AMERICAN: >60 ML/MIN
GFR NON-AFRICAN AMERICAN: >60 ML/MIN
GFR SERPL CREATININE-BSD FRML MDRD: ABNORMAL ML/MIN/{1.73_M2}
GFR SERPL CREATININE-BSD FRML MDRD: ABNORMAL ML/MIN/{1.73_M2}
GLUCOSE BLD-MCNC: 110 MG/DL (ref 70–99)
HBA1C MFR BLD: 4.9 % (ref 4–6)
HCG(URINE) PREGNANCY TEST: NEGATIVE
HCT VFR BLD CALC: 38 % (ref 36–46)
HDLC SERPL-MCNC: 35 MG/DL
HEMOGLOBIN: 13.2 G/DL (ref 12–16)
IMMATURE GRANULOCYTES: ABNORMAL %
LDL CHOLESTEROL: 99 MG/DL (ref 0–130)
LYMPHOCYTES # BLD: 39 % (ref 24–44)
MCH RBC QN AUTO: 32 PG (ref 26–34)
MCHC RBC AUTO-ENTMCNC: 34.8 G/DL (ref 31–37)
MCV RBC AUTO: 92.1 FL (ref 80–100)
MDMA URINE: ABNORMAL
METHADONE SCREEN, URINE: NEGATIVE
METHAMPHETAMINE, URINE: ABNORMAL
MONOCYTES # BLD: 6 % (ref 1–7)
NRBC AUTOMATED: ABNORMAL PER 100 WBC
OPIATES, URINE: NEGATIVE
OXYCODONE SCREEN URINE: NEGATIVE
PDW BLD-RTO: 12.7 % (ref 11.5–14.9)
PHENCYCLIDINE, URINE: NEGATIVE
PLATELET # BLD: 250 K/UL (ref 150–450)
PLATELET ESTIMATE: ABNORMAL
PMV BLD AUTO: 7.5 FL (ref 6–12)
POTASSIUM SERPL-SCNC: 4.4 MMOL/L (ref 3.7–5.3)
PROPOXYPHENE, URINE: ABNORMAL
RBC # BLD: 4.12 M/UL (ref 4–5.2)
RBC # BLD: ABNORMAL 10*6/UL
SEG NEUTROPHILS: 49 % (ref 36–66)
SEGMENTED NEUTROPHILS ABSOLUTE COUNT: 2.9 K/UL (ref 1.3–9.1)
SODIUM BLD-SCNC: 142 MMOL/L (ref 135–144)
TEST INFORMATION: ABNORMAL
THYROXINE, FREE: 0.96 NG/DL (ref 0.93–1.7)
TOTAL PROTEIN: 6.5 G/DL (ref 6.4–8.3)
TRICYCLIC ANTIDEPRESSANTS, UR: ABNORMAL
TRIGL SERPL-MCNC: 116 MG/DL
TSH SERPL DL<=0.05 MIU/L-ACNC: 1.35 MIU/L (ref 0.3–5)
VLDLC SERPL CALC-MCNC: ABNORMAL MG/DL (ref 1–30)
WBC # BLD: 6.1 K/UL (ref 3.5–11)
WBC # BLD: ABNORMAL 10*3/UL

## 2020-07-02 PROCEDURE — 83036 HEMOGLOBIN GLYCOSYLATED A1C: CPT

## 2020-07-02 PROCEDURE — 36415 COLL VENOUS BLD VENIPUNCTURE: CPT

## 2020-07-02 PROCEDURE — 99232 SBSQ HOSP IP/OBS MODERATE 35: CPT | Performed by: PSYCHIATRY & NEUROLOGY

## 2020-07-02 PROCEDURE — 1240000000 HC EMOTIONAL WELLNESS R&B

## 2020-07-02 PROCEDURE — 85025 COMPLETE CBC W/AUTO DIFF WBC: CPT

## 2020-07-02 PROCEDURE — 80053 COMPREHEN METABOLIC PANEL: CPT

## 2020-07-02 PROCEDURE — 6370000000 HC RX 637 (ALT 250 FOR IP): Performed by: PSYCHIATRY & NEUROLOGY

## 2020-07-02 PROCEDURE — 84443 ASSAY THYROID STIM HORMONE: CPT

## 2020-07-02 PROCEDURE — 81025 URINE PREGNANCY TEST: CPT

## 2020-07-02 PROCEDURE — 84439 ASSAY OF FREE THYROXINE: CPT

## 2020-07-02 PROCEDURE — 80307 DRUG TEST PRSMV CHEM ANLYZR: CPT

## 2020-07-02 PROCEDURE — 6370000000 HC RX 637 (ALT 250 FOR IP): Performed by: NURSE PRACTITIONER

## 2020-07-02 PROCEDURE — 80061 LIPID PANEL: CPT

## 2020-07-02 RX ORDER — ARIPIPRAZOLE 5 MG/1
5 TABLET ORAL NIGHTLY
Status: DISCONTINUED | OUTPATIENT
Start: 2020-07-03 | End: 2020-07-03 | Stop reason: HOSPADM

## 2020-07-02 RX ORDER — SENNA PLUS 8.6 MG/1
1 TABLET ORAL 2 TIMES DAILY PRN
Status: DISCONTINUED | OUTPATIENT
Start: 2020-07-02 | End: 2020-07-03 | Stop reason: HOSPADM

## 2020-07-02 RX ADMIN — GABAPENTIN 300 MG: 300 CAPSULE ORAL at 21:52

## 2020-07-02 RX ADMIN — MAGNESIUM HYDROXIDE 30 ML: 400 SUSPENSION ORAL at 13:40

## 2020-07-02 RX ADMIN — SENNOSIDES 8.6 MG: 8.6 TABLET, FILM COATED ORAL at 17:31

## 2020-07-02 RX ADMIN — GABAPENTIN 300 MG: 300 CAPSULE ORAL at 08:24

## 2020-07-02 RX ADMIN — TRAZODONE HYDROCHLORIDE 50 MG: 50 TABLET ORAL at 21:52

## 2020-07-02 RX ADMIN — GABAPENTIN 300 MG: 300 CAPSULE ORAL at 13:37

## 2020-07-02 RX ADMIN — ARIPIPRAZOLE 5 MG: 5 TABLET ORAL at 08:24

## 2020-07-02 ASSESSMENT — ENCOUNTER SYMPTOMS
SORE THROAT: 0
DIARRHEA: 0
SINUS PAIN: 0
VOMITING: 0
EYE DISCHARGE: 0
CHEST TIGHTNESS: 0
EYE REDNESS: 0
NAUSEA: 0
TROUBLE SWALLOWING: 0
COUGH: 0
ABDOMINAL PAIN: 1
SHORTNESS OF BREATH: 0
WHEEZING: 0
BACK PAIN: 0
EYE PAIN: 0
VOICE CHANGE: 0
COLOR CHANGE: 0
CONSTIPATION: 1
BLOOD IN STOOL: 0

## 2020-07-02 ASSESSMENT — PAIN SCALES - GENERAL: PAINLEVEL_OUTOF10: 0

## 2020-07-02 NOTE — PLAN OF CARE
Problem: Altered Mood, Psychotic Behavior:  Goal: Absence of self-harm  Description: Absence of self-harm  7/2/2020 1220 by Blessing Rousseau RN  Outcome: Ongoing  Note: Pt is currently not attempting to inflict self harm. 7/1/2020 2334 by Kishor Kerr LPN  Outcome: Ongoing  7/1/2020 2327 by Kishor Kerr LPN  Outcome: Ongoing     Problem: Substance Abuse:  Goal: Absence of drug withdrawal signs and symptoms  Description: Absence of drug withdrawal signs and symptoms  7/2/2020 1220 by Blessing Rousseau RN  Outcome: Ongoing  Note: Patient is currently denying any signs or symptoms of withdrawal.  7/1/2020 2334 by Kishor Kerr LPN  Outcome: Ongoing  7/1/2020 2327 by Kishor Kerr LPN  Outcome: Ongoing  Goal: Participates in care planning  Description: Participates in care planning  Outcome: Ongoing  Note: Patient met with psychiatrist today via telehealth. Goal: Patient specific goal  Description: Patient specific goal  Outcome: Ongoing  Note: Patient's goal for the shift is to meet with the psychiatrist.     Pt is medication compliant, and received meds per drs orders. Safety checks are maintained every 15 minutes, irregular intervals, and shift change.
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
## 2020-07-02 NOTE — BH NOTE
Patient continues to complain of constipation and received 8.6mg oral of Sennakot per orders. Safety checks are maintained every 15 minutes and at irregular intervals.

## 2020-07-02 NOTE — PLAN OF CARE
[address] [place] [address]  Day 3 Interdisciplinary Treatment Plan NOTE    Review Date & Time: 7/2/2020        1300    Admission Type:   Admission Type: Involuntary    Reason for admission:  Reason for Admission: SI no plan  Estimated Length of Stay: 5-7 days  Estimated Discharge Date Update: to be determined by physician    PATIENT STRENGTHS:  Patient Strengths Strengths: Positive Support, No significant Physical Illness  Patient Strengths and Limitations:Limitations: Tendency to isolate self, Lacks leisure interests, Difficulty problem solving/relies on others to help solve problems, Hopeless about future, Multiple barriers to leisure interests, Inappropriate/potentially harmful leisure interests (depression substance abuse anxiety poor coping skills)  Addictive Behavior:Addictive Behavior  In the past 3 months, have you felt or has someone told you that you have a problem with:  : None  Do you have a history of Chemical Use?: No  Do you have a history of Alcohol Use?: No  Do you have a history of Street Drug Abuse?: Yes  Histroy of Prescripton Drug Abuse?: No  Medical Problems:No past medical history on file.     Risk:  Fall RiskTotal: 53  Rosendo Scale Rosendo Scale Score: 22  BVC Total: 0  Change in scores no Changes to plan of Care no    Status EXAM:   Status and Exam  Normal: No  Facial Expression: Elevated  Affect: Inappropriate  Level of Consciousness: Alert  Mood:Normal: No  Mood: Depressed, Anxious  Motor Activity:Normal: No  Motor Activity: Decreased  Interview Behavior: Cooperative  Preception: [place] to Person, Michelle [place] to Time, [place] to Place, [place] to Situation  Attention:Normal: Yes  Attention: Distractible  Thought Processes: Circumstantial  Thought Content:Normal: Yes  Thought Content: Preoccupations  Hallucinations: None  Delusions: No  Memory:Normal: Yes  Memory: Poor Recent, Confabulation  Insight and Judgment: No  Insight and Judgment: Unmotivated  Present Suicidal Ideation: No  Present

## 2020-07-02 NOTE — PLAN OF CARE
Problem: Altered Mood, Psychotic Behavior:  Goal: Absence of self-harm  Description: Absence of self-harm  7/1/2020 2327 by Cristine Baeza LPN  Outcome: Ongoing     Problem: Substance Abuse:  Goal: Absence of drug withdrawal signs and symptoms  Description: Absence of drug withdrawal signs and symptoms  7/1/2020 2327 by Cristine Baeza LPN  Outcome: Ongoing   Patient denies suicidal ideas at this time. Patient verbalized fleeting homicidal ideas at this time. Patient verbalized depressive symptoms at this time. Patient has been in room. Patient is free of self harm at this time. Patient agrees to seek out staff if thoughts to harm self arise. Staff will provide support and reassurance as needed. Safety checks maintained every 15 minutes.

## 2020-07-02 NOTE — GROUP NOTE
Group Therapy Note    Date: 7/2/2020    Group Start Time: 1330  Group End Time: 9661  Group Topic: Healthy Living/Wellness    [facility]    Rocio Soto, CTRS        Group Therapy Note    Attendees: 11/20         Patient's Goal:  To increase social interaction and to practice gentle stretching and exercises -benefits of exercising were discussed r/t physical and mental mental health, as well as stress relief and relapse prevention     Notes: Pt participated fully in group  Pt was able to practice practice gentle stretching and exercises -benefits of exercising were discussed r/t physical and mental mental health, as well as stress relief and relapse prevention       Status After Intervention:  Improved     Participation Level:  Active Listener and Interactive     Participation Quality: Appropriate, Attentive, Sharing and Supportive        Speech:  normal        Thought Process/Content: Logical  Linear        Affective Functioning: Congruent        Mood: euthymic        Level of consciousness:  Alert, Oriented x4 and Attentive        Response to Learning: Able to verbalize current knowledge/experience, Able to verbalize/acknowledge new learning, Able to retain information and Progressing to goal        Endings: None Reported     Modes of Intervention: Education, Support, Socialization, Exploration, Clarifying and Problem-solving        Discipline Responsible: Psychoeducational Specialist        Signature:  Sarah Valladares
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
## 2020-07-02 NOTE — BH NOTE
Patient refused to attend wellness group at 1600 after encouragement from staff. 1:1 talk time provided as alternative to group session.

## 2020-07-02 NOTE — GROUP NOTE
Group Therapy Note    Date: 7/2/2020    Group Start Time: 0900  Group End Time: 2700  Group Topic: Jose 4 EUGENIAI ERNESTINE Qureshi, [address]        Group Therapy Note    Attendees: 12  Pt did not attend [employer] d/t resting in room despite staff invitation to attend. 1:1 talk time offered as alternative to group session, pt declined.

## 2020-07-02 NOTE — GROUP NOTE
Group Therapy Note    Date: 7/2/2020    Group Start Time: 1000  Group End Time: 1030  Group Topic: Psychotherapy    RENETTA BELLOI ERNESTINE Wen        Group Therapy Note         Patient refused to attend psychotherapy group after encouragement from staff. 1:1 talk time offered but refused. Signature:   Obey Wen

## 2020-07-03 VITALS
HEIGHT: 66 IN | DIASTOLIC BLOOD PRESSURE: 63 MMHG | TEMPERATURE: 97.9 F | BODY MASS INDEX: 29.89 KG/M2 | HEART RATE: 81 BPM | WEIGHT: 186 LBS | RESPIRATION RATE: 16 BRPM | OXYGEN SATURATION: 99 % | SYSTOLIC BLOOD PRESSURE: 119 MMHG

## 2020-07-03 PROCEDURE — 99238 HOSP IP/OBS DSCHRG MGMT 30/<: CPT | Performed by: PSYCHIATRY & NEUROLOGY

## 2020-07-03 PROCEDURE — 6370000000 HC RX 637 (ALT 250 FOR IP): Performed by: NURSE PRACTITIONER

## 2020-07-03 RX ORDER — ARIPIPRAZOLE 5 MG/1
5 TABLET ORAL NIGHTLY
Qty: 30 TABLET | Refills: 3 | Status: SHIPPED | OUTPATIENT
Start: 2020-07-03 | End: 2020-07-03

## 2020-07-03 RX ORDER — ARIPIPRAZOLE 5 MG/1
5 TABLET ORAL NIGHTLY
Qty: 30 TABLET | Refills: 3 | Status: SHIPPED | OUTPATIENT
Start: 2020-07-03 | End: 2020-11-23

## 2020-07-03 RX ADMIN — GABAPENTIN 300 MG: 300 CAPSULE ORAL at 09:01

## 2020-07-03 RX ADMIN — ACETAMINOPHEN 650 MG: 325 TABLET, FILM COATED ORAL at 10:29

## 2020-07-03 ASSESSMENT — PAIN SCALES - GENERAL
PAINLEVEL_OUTOF10: 5
PAINLEVEL_OUTOF10: 0
PAINLEVEL_OUTOF10: 0

## 2020-07-03 NOTE — GROUP NOTE
Group Therapy Note    Date: 7/3/2020    Group Start Time: 1000  Group End Time: 1100  Group Topic: Psychotherapy    [facility]    IGNACIO Hussein, [facility] [place]        Group Therapy Note    Attendees: 13/23         Patient's Goal: Pt will participate in psychotherapy in order to help eliminate or control troubling symptoms so a person can function better and can increase well-being and healing. Notes:  Pt often re-directed for side talk. Pt appeared irritable in mood, resistant in partaking in group discussion. Pt left group early.       Status After Intervention:  Unchanged    Participation Level: Interactive    Participation Quality: Resistant      Speech:  normal      Thought Process/Content: Logical      Affective Functioning: Congruent      Mood: irritable      Level of consciousness:  Alert, Oriented x4 and Attentive      Response to Learning: Able to verbalize/acknowledge new learning and Progressing to goal      Endings: None Reported    Modes of Intervention: Education, Exploration, Clarifying and Problem-solving      Discipline Responsible: /Counselor      Signature:  IGNACIO Hussein, [place]

## 2020-07-03 NOTE — GROUP NOTE
Group Therapy Note    Date: 7/3/2020    Group Start Time: 0900  Group End Time: 0930  Group Topic: Community Meeting    RENETTA SINHA    Kecia Cade        Group Therapy Note    Attendees: 14/23         Patient's Goal:  Patient stated that her goal for today is \"stay up\". Notes:  Patient was pleasant and appropriate throughout the session. Patient did appear drowsy and stated that she has been feeling increasingly tired. Patient was encouraged to get herself on a healthy sleep schedule, but also listen to her body and rest of needed. Status After Intervention:  Improved    Participation Level:  Active Listener and Interactive    Participation Quality: Appropriate, Attentive, Sharing and Supportive      Speech:  normal      Thought Process/Content: Logical      Affective Functioning: Congruent      Mood: euthymic      Level of consciousness:  Alert, Oriented x4 and Attentive      Response to Learning: Able to verbalize current knowledge/experience, Able to verbalize/acknowledge new learning, Capable of insight and Progressing to goal      Endings: None Reported    Modes of Intervention: Education, Support, Socialization, Exploration, Clarifying and Problem-solving      Discipline Responsible: Psychoeducational Specialist      Signature:  Kecia Cade

## 2020-07-03 NOTE — BH NOTE
[facility]  Discharge Note    Pt discharged with followings belongings:   Dentures: None  Vision - Corrective Lenses: Contacts  Hearing Aid: None  Jewelry: None  Body Piercings Removed: N/A  Clothing: Shirt, Pants, Footwear, Socks, Undergarments (Comment), Dress  Were All Patient Medications Collected?: Not Applicable  Other Valuables: Cell phone   Valuables sent home with patient. Valuables retrieved from safe, Security envelope number:  [id number] and returned to patient. Patient education on aftercare instructions: yes  Information faxed to The pf  by nurse Patient verbalize understanding of AVS:  yes. Status EXAM upon discharge:  Status and Exam  Normal: Yes  Facial Expression: Flat  Affect: Appropriate  Level of Consciousness: Alert  Mood:Normal: No  Mood: Anxious  Motor Activity:Normal: Yes  Motor Activity: Decreased  Interview Behavior: Cooperative  Preception: [place] to Person, [place] to Time, [place] to Place, [place] to Situation  Attention:Normal: Yes  Attention: Distractible  Thought Processes: Circumstantial  Thought Content:Normal: Yes  Thought Content: Preoccupations  Hallucinations: None  Delusions: No  Memory:Normal: Yes  Insight and Judgment: No  Insight and Judgment: Poor Judgment, Poor Insight, Unmotivated  Present Suicidal Ideation: No  Present Homicidal Ideation: No      Metabolic Screening:    Lab Results   Component Value Date    LABA1C 4.9 07/02/2020       Lab Results   Component Value Date    CHOL 157 07/02/2020    CHOL 189 04/15/2016     Lab Results   Component Value Date    TRIG 116 07/02/2020    TRIG 110 04/15/2016     Lab Results   Component Value Date    HDL 35 (L) 07/02/2020    HDL 36 (L) 04/15/2016     No components found for: LDLCAL  No results found for: LABVLDL     Patient left facility at 1450 via transportation from her daughter to her own private residence. No concerns were voiced or noted at this time.     Serena Orozco RN
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
## 2020-07-03 NOTE — PLAN OF CARE
Problem: Altered Mood, Psychotic Behavior:  Goal: Absence of self-harm  Description: Absence of self-harm  7/3/2020 0929 by Lakeisha Brooks RN  Outcome: Ongoing  Note: Patient is calm and cooperative. Patient denies any hallucinations. Patient denies depression and reports anxiety in relation to wanting to be discharged. Patient denies suicidal and homicidal ideations and agrees to seek staff if those feelings arise. Safety checks are in place for every 15 minutes. Will continue to monitor for safety and support. Problem: Substance Abuse:  Goal: Absence of drug withdrawal signs and symptoms  Description: Absence of drug withdrawal signs and symptoms  7/3/2020 0929 by Lakeisha Brooks RN  Outcome: Ongoing  Note: Patient is not exhibiting any withdrawal symptoms. Will continue to monitor.      Problem: Substance Abuse:  Goal: Participates in care planning  Description: Participates in care planning  7/3/2020 0929 by Lakeisha Brooks RN  Outcome: Ongoing     Problem: Substance Abuse:  Goal: Patient specific goal  Description: Patient specific goal  7/3/2020 0929 by Lakeisha Brooks RN  Outcome: Ongoing
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
## 2020-07-03 NOTE — GROUP NOTE
Group Therapy Note    Date: 7/3/2020    Group Start Time: 1330  Group End Time: 1400  Group Topic: Cognitive Skills    CZ BHWYATT Rodriguez        Group Therapy Note    Attendees: 12         Patient's Goal:  To increase interpersonal skills     Notes:  Patient attended group and participated     Status After Intervention:  Improved    Participation Level:  Active Listener and Interactive    Participation Quality: Appropriate, Attentive and Sharing      Speech:  normal      Thought Process/Content: Logical      Affective Functioning: Congruent      Mood: euthymic      Level of consciousness:  Alert and Oriented x4      Response to Learning: Progressing to goal      Endings: None Reported    Modes of Intervention: Socialization and Activity      Discipline Responsible: Psychoeducational Specialist      Signature:  Cheli Griffiths

## 2020-07-06 RX ORDER — GABAPENTIN 300 MG/1
300 CAPSULE ORAL 3 TIMES DAILY
Qty: 90 CAPSULE | Refills: 1 | Status: SHIPPED | OUTPATIENT
Start: 2020-07-06 | End: 2020-09-08

## 2020-07-06 NOTE — CARE COORDINATION
Name: Judi Hughes    : 1980    Discharge Date: 7/3/20    Primary Auth/Cert #: [id number]    Discharged to home   Discharge Medications:      Medication List      START taking these medications    ARIPiprazole 5 MG tablet  Commonly known as:  ABILIFY  Take 1 tablet by mouth nightly        CHANGE how you take these medications    gabapentin 300 MG capsule  Commonly known as:  NEURONTIN  take 1 capsule by mouth three times a day  What changed:  See the new instructions. Notes to patient:  Nerve pain        STOP taking these medications    lidocaine 5 % ointment  Commonly known as:  XYLOCAINE     phentermine 37.5 MG tablet  Commonly known as:  ADIPEX-P           Where to Get Your Medications      These medications were sent to [place] [address] [facility]. [facility] [phone] Hollie Fernnadez [phone]  [address] [place] [address]., [address] [place] [phone]    Phone:  [phone]   · ARIPiprazole 5 MG tablet         Follow Up Appointment: Faraz Nava MD  [address] [place] [address], [facility] [address] [place] [phone]  [facility] 3150  [address] [place] [address] [facility]  [phone]      SW will contact pt @[phone] on  with follow-up appointment, due to [facility] being closed Friday 7/3. Please discharge PT to  [address]    If PT doesn't have a ride home please send by [employer] cab.

## 2020-07-08 ENCOUNTER — TELEMEDICINE (OUTPATIENT)
Dept: FAMILY MEDICINE CLINIC | Age: 40
End: 2020-07-08
Payer: MEDICARE

## 2020-07-08 PROCEDURE — 99213 OFFICE O/P EST LOW 20 MIN: CPT | Performed by: FAMILY MEDICINE

## 2020-07-08 PROCEDURE — 1111F DSCHRG MED/CURRENT MED MERGE: CPT | Performed by: FAMILY MEDICINE

## 2020-07-08 PROCEDURE — G8428 CUR MEDS NOT DOCUMENT: HCPCS | Performed by: FAMILY MEDICINE

## 2020-07-08 RX ORDER — PHENTERMINE HYDROCHLORIDE 37.5 MG/1
37.5 TABLET ORAL
Qty: 30 TABLET | Refills: 0 | Status: SHIPPED | OUTPATIENT
Start: 2020-07-08 | End: 2020-07-09 | Stop reason: SDUPTHER

## 2020-07-08 RX ORDER — AMOXICILLIN 500 MG/1
500 CAPSULE ORAL 2 TIMES DAILY
Qty: 14 CAPSULE | Refills: 0 | Status: SHIPPED | OUTPATIENT
Start: 2020-07-08 | End: 2020-07-09 | Stop reason: SDUPTHER

## 2020-07-08 ASSESSMENT — PATIENT HEALTH QUESTIONNAIRE - PHQ9
SUM OF ALL RESPONSES TO PHQ QUESTIONS 1-9: 0
2. FEELING DOWN, DEPRESSED OR HOPELESS: 0
SUM OF ALL RESPONSES TO PHQ9 QUESTIONS 1 & 2: 0
SUM OF ALL RESPONSES TO PHQ QUESTIONS 1-9: 0
1. LITTLE INTEREST OR PLEASURE IN DOING THINGS: 0

## 2020-07-08 NOTE — PROGRESS NOTES
General FM note    Margret Bella is a 44 y.o. female who presents today for follow up on her  medical conditions as noted below. Margret Bella is c/o of No chief complaint on file. Patient Active Problem List:     Suicide attempt ([facility]     Bipolar 1 disorder ([facility]     Postnasal drip     Marijuana abuse     Obesity (BMI 30.0-34. 9)     Closed nondisplaced oblique fracture of shaft of left ulna     Closed nondisplaced oblique fracture of shaft of ulna with routine healing     Closed fracture of lower end of right radius with routine healing     Fibromyalgia     Depression with suicidal ideation     Polysubstance abuse ([facility]     Bipolar II disorder (HCC)     Right sided abdominal pain     Constipation     Past Medical History:   Diagnosis Date    ADD (attention deficit disorder)     ADHD (attention deficit hyperactivity disorder)     Bipolar 1 disorder (HCC)     Bronchitis     Constipation     Depression     Diarrhea     Difficulty swallowing     Dizziness     Fibromyalgia     Fibromyalgia     Headache     HLD (hyperlipidemia)     IBS (irritable bowel syndrome)     Nausea & vomiting     Nervously anxious     SOB (shortness of breath)     Thyroid disorder     Wears glasses     Weight gain       Past Surgical History:   Procedure Laterality Date    DILATION AND CURETTAGE OF UTERUS      EXCISION / BIOPSY SKIN LESION OF LEG Left 2/24/2017    EXCISION MEDIAL THIGH LESION performed by Real Ovalles IV, DO at [address]      2 cone biopsys    PRE-MALIGNANT / BENIGN SKIN LESION EXCISION Left 02/24/2017    inner thigh     TONSILLECTOMY      adenoids     Family History   Problem Relation Age of Onset    Hypertension Mother     Diabetes Father     Brain Cancer Maternal Grandmother     Heart Defect Maternal Grandfather      Current Outpatient Medications   Medication Sig Dispense Refill    amoxicillin (AMOXIL) 500 MG capsule Take 1 capsule by mouth 2 times daily for 7 days 14 capsule 0    phentermine (ADIPEX-P) 37.5 MG tablet Take 1 tablet by mouth every morning (before breakfast) for 30 days. 30 tablet 0    gabapentin (NEURONTIN) 300 MG capsule Take 1 capsule by mouth 3 times daily for 30 days. LF 6/5/20 (30 day supply) 90 capsule 1    ARIPiprazole (ABILIFY) 5 MG tablet Take 1 tablet by mouth nightly 30 tablet 3     No current facility-administered medications for this visit. ALLERGIES:    Allergies   Allergen Reactions    Sulfamethoxazole-Trimethoprim      dont remember RX       Social History     Tobacco Use    Smoking status: Never Smoker    Smokeless tobacco: Never Used   Substance Use Topics    Alcohol use: Yes     Alcohol/week: 0.0 standard drinks      There is no height or weight on file to calculate BMI. LMP  (LMP Unknown)     Subjective:      HPI    45 yo female reaching out per tele med visit today. Bad tooth feels that she has a UTI. Pain with urination. The same sx as before. Some weeks. Not loosing weight. Cut down her portions. Not able to exercise a lot bc of the heat. 186 lbs. No SE. Pt was seen in the Er bc of depression but she feels that not being on face book helps her a lot. She does not feel depressed. Review of Systems   Pertinent items are noted in HPI. Objective:   Physical Exam  General appearance: normal development, habitus and attention, no deformities. No distress. Pulmo: normal breathing pattern. Psychiatric: alert and oriented to place, time and person. Normal mood and affect. Assessment:       Diagnosis Orders   1. Class 1 obesity due to excess calories without serious comorbidity with body mass index (BMI) of 30.0 to 30.9 in adult  phentermine (ADIPEX-P) 37.5 MG tablet   2. UTI symptoms  amoxicillin (AMOXIL) 500 MG capsule       Plan:   Last script of adipex provided. Continue exercises and diet. Call if there are any changes.   Call or return to clinic prn if these symptoms worsen or fail to improve as anticipated. I have reviewed the instructions with the patient, answering all questions to her satisfaction. Sebas Benjamin is a 44 y.o. female being evaluated by a Virtual Visit (video visit) encounter to address concerns as mentioned above. A caregiver was present when appropriate. Due to this being a TeleHealth encounter (During ACONK-62 public health emergency), evaluation of the following organ systems was limited: Vitals/Constitutional/EENT/Resp/CV/GI//MS/Neuro/Skin/Heme-Lymph-Imm. Pursuant to the emergency declaration under the [address] [place] [address], [address] [place] [address] authority and the [employer] Act, this Virtual Visit was conducted with patient's (and/or legal guardian's) consent, to reduce the patient's risk of exposure to COVID-19 and provide necessary medical care. The patient (and/or legal guardian) has also been advised to contact this office for worsening conditions or problems, and seek emergency medical treatment and/or call 911 if deemed necessary. Patient identification was verified at the start of the visit: Yes    Total time spent for this encounter: Not billed by time    Services were provided through a video synchronous discussion virtually to substitute for in-person clinic visit. Patient and provider were located at their individual homes. --North Gomes MD on 7/8/2020 at 1:06 PM    An electronic signature was used to authenticate this note. Return in about 6 months (around 1/8/2021), or if symptoms worsen or fail to improve, for [place]. No orders of the defined types were placed in this encounter. Orders Placed This Encounter   Medications    amoxicillin (AMOXIL) 500 MG capsule     Sig: Take 1 capsule by mouth 2 times daily for 7 days     Dispense:  14 capsule     Refill:  0    phentermine (ADIPEX-P) 37.5 MG tablet     Sig: Take 1 tablet by mouth every morning (before breakfast) for 30 days. Dispense:  30 tablet     Refill:  0

## 2020-07-09 RX ORDER — PHENTERMINE HYDROCHLORIDE 37.5 MG/1
37.5 TABLET ORAL
Qty: 30 TABLET | Refills: 0 | Status: SHIPPED | OUTPATIENT
Start: 2020-07-09 | End: 2021-04-29 | Stop reason: SDUPTHER

## 2020-07-09 RX ORDER — AMOXICILLIN 500 MG/1
500 CAPSULE ORAL 2 TIMES DAILY
Qty: 14 CAPSULE | Refills: 0 | Status: SHIPPED | OUTPATIENT
Start: 2020-07-09 | End: 2020-09-17

## 2020-07-09 NOTE — TELEPHONE ENCOUNTER
Pt meds sent to wrong pharmacy. Pt request meds be sent to [facility]. Med pended.

## 2020-07-20 ENCOUNTER — NURSE TRIAGE (OUTPATIENT)
Dept: OTHER | Facility: CLINIC | Age: 40
End: 2020-07-20

## 2020-09-08 RX ORDER — GABAPENTIN 300 MG/1
CAPSULE ORAL
Qty: 90 CAPSULE | Refills: 1 | Status: SHIPPED | OUTPATIENT
Start: 2020-09-08 | End: 2020-11-23

## 2020-09-08 NOTE — TELEPHONE ENCOUNTER
Yoli Caldera is calling to request a refill on the following medication(s):    Last Visit Date (If Applicable):  8/4/6745    Next Visit Date:    Visit date not found    Medication Request:  Requested Prescriptions     Pending Prescriptions Disp Refills    gabapentin (NEURONTIN) 300 MG capsule [Pharmacy Med Name: GABAPENTIN 300 MG CAPSULE] 90 capsule 1     Sig: take 1 capsule by mouth three times a day

## 2020-09-17 RX ORDER — AMOXICILLIN 500 MG/1
CAPSULE ORAL
Qty: 14 CAPSULE | Refills: 0 | Status: SHIPPED | OUTPATIENT
Start: 2020-09-17 | End: 2020-12-01 | Stop reason: ALTCHOICE

## 2020-09-17 NOTE — TELEPHONE ENCOUNTER
Lisa Stockton is calling to request a refill on the following medication(s):    Last Visit Date (If Applicable):  5/0/0886    Next Visit Date:    Visit date not found    Medication Request:  Requested Prescriptions     Pending Prescriptions Disp Refills    amoxicillin (AMOXIL) 500 MG capsule [Pharmacy Med Name: AMOXICILLIN 500 MG CAPSULE] 14 capsule 0     Sig: take 1 capsule by mouth twice a day for 7 days

## 2020-10-13 RX ORDER — LIDOCAINE 50 MG/G
OINTMENT TOPICAL
Qty: 240 G | Refills: 1 | Status: SHIPPED | OUTPATIENT
Start: 2020-10-13 | End: 2021-07-19 | Stop reason: SDUPTHER

## 2020-11-20 NOTE — TELEPHONE ENCOUNTER
Yan Reynoso is calling to request a refill on the following medication(s):    Last Visit Date (If Applicable):  2/0/2541    Next Visit Date:    Visit date not found    Medication Request:  Requested Prescriptions     Pending Prescriptions Disp Refills    gabapentin (NEURONTIN) 300 MG capsule [Pharmacy Med Name: GABAPENTIN 300 MG CAPSULE] 90 capsule 1     Sig: take 1 capsule by mouth three times a day

## 2020-11-23 RX ORDER — GABAPENTIN 300 MG/1
CAPSULE ORAL
Qty: 90 CAPSULE | Refills: 1 | Status: ON HOLD | OUTPATIENT
Start: 2020-11-23 | End: 2021-02-03

## 2020-11-23 RX ORDER — ARIPIPRAZOLE 5 MG/1
5 TABLET ORAL NIGHTLY
Qty: 30 TABLET | Refills: 3 | Status: SHIPPED | OUTPATIENT
Start: 2020-11-23 | End: 2021-03-16

## 2020-11-23 NOTE — TELEPHONE ENCOUNTER
Yan Reynoso is calling to request a refill on the following medication(s):    Last Visit Date (If Applicable):  8/9/3205    Next Visit Date:    Visit date not found    Medication Request:  Requested Prescriptions     Pending Prescriptions Disp Refills    ARIPiprazole (ABILIFY) 5 MG tablet [Pharmacy Med Name: ARIPIPRAZOLE 5 MG TABLET] 30 tablet 3     Sig: take 1 tablet by mouth nightly

## 2020-12-01 ENCOUNTER — OFFICE VISIT (OUTPATIENT)
Dept: PODIATRY | Age: 40
End: 2020-12-01
Payer: MEDICARE

## 2020-12-01 VITALS — BODY MASS INDEX: 32.14 KG/M2 | HEIGHT: 66 IN | WEIGHT: 200 LBS

## 2020-12-01 PROCEDURE — G8427 DOCREV CUR MEDS BY ELIG CLIN: HCPCS | Performed by: PODIATRIST

## 2020-12-01 PROCEDURE — 99203 OFFICE O/P NEW LOW 30 MIN: CPT | Performed by: PODIATRIST

## 2020-12-01 PROCEDURE — G8484 FLU IMMUNIZE NO ADMIN: HCPCS | Performed by: PODIATRIST

## 2020-12-01 PROCEDURE — G8417 CALC BMI ABV UP PARAM F/U: HCPCS | Performed by: PODIATRIST

## 2020-12-01 PROCEDURE — 1036F TOBACCO NON-USER: CPT | Performed by: PODIATRIST

## 2020-12-01 ASSESSMENT — ENCOUNTER SYMPTOMS
NAUSEA: 0
BACK PAIN: 0
COLOR CHANGE: 0
DIARRHEA: 0
SHORTNESS OF BREATH: 0

## 2020-12-01 NOTE — PROGRESS NOTES
Maxine Lerner is a 44 y.o. female who presents to the office today with chief complaint of pain and numbness to the right fourth and fifth toes. Chief Complaint   Patient presents with    Foot Pain     right 4th and 5th toes are going numb and painful    Other     since July, no injury     Other     no xrays   Symptoms began about 6 month(s) ago. Patient denies injury to the right foot. Patient states that the toes are most painful with shoe wear. Pain is rated 10 out of 10 at it's worst and is described as intermittent. Treatments prior to today's visit include: None. Allergies   Allergen Reactions    Sulfamethoxazole-Trimethoprim      dont remember RX       Past Medical History:   Diagnosis Date    ADD (attention deficit disorder)     ADHD (attention deficit hyperactivity disorder)     Bipolar 1 disorder (HCC)     Bronchitis     Constipation     Depression     Diarrhea     Difficulty swallowing     Dizziness     Fibromyalgia     Fibromyalgia     Headache     HLD (hyperlipidemia)     IBS (irritable bowel syndrome)     Nausea & vomiting     Nervously anxious     SOB (shortness of breath)     Thyroid disorder     Wears glasses     Weight gain        Prior to Admission medications    Medication Sig Start Date End Date Taking?  Authorizing Provider   gabapentin (NEURONTIN) 300 MG capsule take 1 capsule by mouth three times a day 11/23/20 12/23/20 Yes Yunier Lock MD   ARIPiprazole (ABILIFY) 5 MG tablet take 1 tablet by mouth nightly 11/23/20  Yes Yunier Lock MD   lidocaine (XYLOCAINE) 5 % ointment apply topically if needed 10/13/20  Yes Yunier Lock MD       Past Surgical History:   Procedure Laterality Date    DILATION AND CURETTAGE OF UTERUS      EXCISION / BIOPSY SKIN LESION OF LEG Left 2/24/2017    EXCISION MEDIAL THIGH LESION performed by Eran Ovalles IV, DO at [facility]      2 cone biopsys    PRE-MALIGNANT / BENIGN SKIN LESION EXCISION There are no preulcerative lesions noted to the left foot. The skin to the bilateral feet is not thin and shiny. The skin to the bilateral feet is  warm, supple, and dry. Vascular: DP pulse of the right foot is  palpable. DP pulse of the left foot is  palpable. PT pulse of the right foot is  palpable. PT pulse of the left foot is  palpable. CFT is less than 3 secs to the digits of the right foot. CFT is less than 3 secs to the digits of the left foot. There is no edema noted to the bilateral foot or ankle. There is hair growth noted to the digits of the bilateral feet. There are no varicosities noted to the right foot/ankle. There are no varicosities noted to the left foot/ankle. Erythema is absent to the bilateral feet. Neurological: Reflexes are present to the right plantar foot and to the Achilles tendon. Reflexes are present to the left plantar foot and to the Achilles tendon. Epicritic sensation is  intact to the right foot. Epicritic sensation is  intact to the left foot. Musculoskeletal:  Muscle strength is +5/5 to all four muscle groups of the right lower extremity and +5/5 to all four muscle groups of the left lower extremity. There are no areas of subluxation, dislocation, or laxity noted to either lower extremity. Range of motion to the right ankle is  free of pain or grinding. Range of motion to the left ankle is  free of pain or grinding. Range of motion to the right subtalar joint is  free of pain or grinding. Range of motion to the left subtalar joint is  free of pain or grinding. No abnormalities, asymmetries, or misalignments are seen between the extremities. Weightbearing evaluation does not reveal rearfoot eversion, medial prominence of the talar head, loss of the medial longitudinal arch height, and too many toes sign bilaterally. The fourth and fifth digits of the right foot are contracted. The fourth toe is flexible. The fifth toe is rigid. There is pain with attempt at reduction of these toes. The fourth and fifth digits of the left foot are contracted. Both of these toes are flexible and there is no pain with attempt at reduction of either toe. There is no prominence noted to the first metatarsal head without abduction of the hallux of the right foot. There is no prominence noted to the first metatarsal head without abduction of the hallux of the left foot. Shoe examination was performed. Biomechanical Exam: normal bilaterally. X-ray's taken: AP, Lateral, and Medial Oblique of the right foot. Findings: There is contracture noted to the fourth and fifth toes. There appears to be fusion of the DIPJ of the fifth toe. Asessment: Patient is a 44 y.o. female with:    Diagnosis Orders   1. Hammer toe of right foot  XR FOOT RIGHT (MIN 3 VIEWS)   2. Pain in toe of right foot  XR FOOT RIGHT (MIN 3 VIEWS)       Plan:  1. Clinical evaluation of the patient. 2. Patient informed that due to her symptoms as well as my clinical and radiographic findings I am recommending surgery to correct the painful hammertoes of the right foot. Patient informed that she will need a tenotomy of the right fourth and fifth toes and resection of bone from the fifth toe. Patient states that she will schedule this with the front office. 3. Contact office with any questions/problems/concerns. Return if symptoms worsen or fail to improve, for Patient to schedule surgery for hammertoe repair right foot.    12/1/2020      Yobani Gambino DPM

## 2020-12-30 ENCOUNTER — TELEPHONE (OUTPATIENT)
Dept: PODIATRY | Age: 40
End: 2020-12-30

## 2021-01-30 ENCOUNTER — HOSPITAL ENCOUNTER (OUTPATIENT)
Dept: LAB | Age: 41
Setting detail: SPECIMEN
Discharge: HOME | End: 2021-01-30
Payer: MEDICARE

## 2021-01-30 DIAGNOSIS — Z01.818 PRE-OP TESTING: Primary | ICD-10-CM

## 2021-01-30 PROCEDURE — U0003 INFECTIOUS AGENT DETECTION BY NUCLEIC ACID (DNA OR RNA); SEVERE ACUTE RESPIRATORY SYNDROME CORONAVIRUS 2 (SARS-COV-2) (CORONAVIRUS DISEASE [COVID-19]), AMPLIFIED PROBE TECHNIQUE, MAKING USE OF HIGH THROUGHPUT TECHNOLOGIES AS DESCRIBED BY CMS-2020-01-R: HCPCS

## 2021-01-30 PROCEDURE — U0005 INFEC AGEN DETEC AMPLI PROBE: HCPCS

## 2021-01-31 LAB
SARS-COV-2, RAPID: NORMAL
SARS-COV-2: NORMAL
SARS-COV-2: NOT DETECTED
SOURCE: NORMAL

## 2021-02-02 ENCOUNTER — ANESTHESIA EVENT (OUTPATIENT)
Dept: OPERATING ROOM | Age: 41
End: 2021-02-02
Payer: MEDICARE

## 2021-02-02 DIAGNOSIS — M25.531 PAIN, WRIST, RIGHT: ICD-10-CM

## 2021-02-02 DIAGNOSIS — R20.2 NUMBNESS AND TINGLING IN RIGHT HAND: ICD-10-CM

## 2021-02-02 DIAGNOSIS — R20.0 NUMBNESS AND TINGLING IN RIGHT HAND: ICD-10-CM

## 2021-02-03 ENCOUNTER — APPOINTMENT (OUTPATIENT)
Dept: GENERAL RADIOLOGY | Age: 41
End: 2021-02-03
Attending: PODIATRIST
Payer: MEDICARE

## 2021-02-03 ENCOUNTER — ANESTHESIA (OUTPATIENT)
Dept: OPERATING ROOM | Age: 41
End: 2021-02-03
Payer: MEDICARE

## 2021-02-03 ENCOUNTER — HOSPITAL ENCOUNTER (OUTPATIENT)
Age: 41
Setting detail: OUTPATIENT SURGERY
Discharge: HOME | End: 2021-02-03
Attending: PODIATRIST | Admitting: PODIATRIST
Payer: MEDICARE

## 2021-02-03 VITALS
OXYGEN SATURATION: 100 % | DIASTOLIC BLOOD PRESSURE: 64 MMHG | TEMPERATURE: 97.1 F | BODY MASS INDEX: 32.14 KG/M2 | HEART RATE: 54 BPM | HEIGHT: 66 IN | RESPIRATION RATE: 16 BRPM | WEIGHT: 200 LBS | SYSTOLIC BLOOD PRESSURE: 115 MMHG

## 2021-02-03 VITALS
DIASTOLIC BLOOD PRESSURE: 53 MMHG | SYSTOLIC BLOOD PRESSURE: 111 MMHG | RESPIRATION RATE: 12 BRPM | OXYGEN SATURATION: 96 %

## 2021-02-03 DIAGNOSIS — G89.18 ACUTE POST-OPERATIVE PAIN: Primary | ICD-10-CM

## 2021-02-03 LAB
-: NORMAL
HCG, PREGNANCY URINE (POC): NEGATIVE

## 2021-02-03 PROCEDURE — 3700000001 HC ADD 15 MINUTES (ANESTHESIA): Performed by: PODIATRIST

## 2021-02-03 PROCEDURE — 28285 REPAIR OF HAMMERTOE: CPT | Performed by: PODIATRIST

## 2021-02-03 PROCEDURE — 28232 INCISION OF TOE TENDON: CPT | Performed by: PODIATRIST

## 2021-02-03 PROCEDURE — 7100000031 HC ASPR PHASE II RECOVERY - ADDTL 15 MIN: Performed by: PODIATRIST

## 2021-02-03 PROCEDURE — 6360000002 HC RX W HCPCS: Performed by: NURSE ANESTHETIST, CERTIFIED REGISTERED

## 2021-02-03 PROCEDURE — 7100000001 HC PACU RECOVERY - ADDTL 15 MIN: Performed by: PODIATRIST

## 2021-02-03 PROCEDURE — 3600000002 HC SURGERY LEVEL 2 BASE: Performed by: PODIATRIST

## 2021-02-03 PROCEDURE — 73630 X-RAY EXAM OF FOOT: CPT

## 2021-02-03 PROCEDURE — 3600000012 HC SURGERY LEVEL 2 ADDTL 15MIN: Performed by: PODIATRIST

## 2021-02-03 PROCEDURE — 3700000000 HC ANESTHESIA ATTENDED CARE: Performed by: PODIATRIST

## 2021-02-03 PROCEDURE — 7100000000 HC PACU RECOVERY - FIRST 15 MIN: Performed by: PODIATRIST

## 2021-02-03 PROCEDURE — 81025 URINE PREGNANCY TEST: CPT

## 2021-02-03 PROCEDURE — 2580000003 HC RX 258: Performed by: ANESTHESIOLOGY

## 2021-02-03 PROCEDURE — 2500000003 HC RX 250 WO HCPCS: Performed by: NURSE ANESTHETIST, CERTIFIED REGISTERED

## 2021-02-03 PROCEDURE — 7100000030 HC ASPR PHASE II RECOVERY - FIRST 15 MIN: Performed by: PODIATRIST

## 2021-02-03 PROCEDURE — 2709999900 HC NON-CHARGEABLE SUPPLY: Performed by: PODIATRIST

## 2021-02-03 PROCEDURE — 2500000003 HC RX 250 WO HCPCS: Performed by: PODIATRIST

## 2021-02-03 PROCEDURE — 6370000000 HC RX 637 (ALT 250 FOR IP): Performed by: ANESTHESIOLOGY

## 2021-02-03 RX ORDER — MEPERIDINE HYDROCHLORIDE 25 MG/ML
12.5 INJECTION INTRAMUSCULAR; INTRAVENOUS; SUBCUTANEOUS EVERY 5 MIN PRN
Status: DISCONTINUED | OUTPATIENT
Start: 2021-02-03 | End: 2021-02-03 | Stop reason: HOSPADM

## 2021-02-03 RX ORDER — SODIUM CHLORIDE 0.9 % (FLUSH) 0.9 %
10 SYRINGE (ML) INJECTION EVERY 12 HOURS SCHEDULED
Status: DISCONTINUED | OUTPATIENT
Start: 2021-02-03 | End: 2021-02-03 | Stop reason: HOSPADM

## 2021-02-03 RX ORDER — DOXYCYCLINE HYCLATE 100 MG
100 TABLET ORAL DAILY
Qty: 7 TABLET | Refills: 0 | Status: SHIPPED | OUTPATIENT
Start: 2021-02-03 | End: 2021-02-10

## 2021-02-03 RX ORDER — FENTANYL CITRATE 50 UG/ML
INJECTION, SOLUTION INTRAMUSCULAR; INTRAVENOUS PRN
Status: DISCONTINUED | OUTPATIENT
Start: 2021-02-03 | End: 2021-02-03 | Stop reason: SDUPTHER

## 2021-02-03 RX ORDER — LABETALOL HYDROCHLORIDE 5 MG/ML
5 INJECTION, SOLUTION INTRAVENOUS EVERY 10 MIN PRN
Status: DISCONTINUED | OUTPATIENT
Start: 2021-02-03 | End: 2021-02-03 | Stop reason: HOSPADM

## 2021-02-03 RX ORDER — MORPHINE SULFATE 2 MG/ML
2 INJECTION, SOLUTION INTRAMUSCULAR; INTRAVENOUS EVERY 5 MIN PRN
Status: DISCONTINUED | OUTPATIENT
Start: 2021-02-03 | End: 2021-02-03 | Stop reason: HOSPADM

## 2021-02-03 RX ORDER — DIPHENHYDRAMINE HYDROCHLORIDE 50 MG/ML
12.5 INJECTION INTRAMUSCULAR; INTRAVENOUS
Status: DISCONTINUED | OUTPATIENT
Start: 2021-02-03 | End: 2021-02-03 | Stop reason: HOSPADM

## 2021-02-03 RX ORDER — KETOROLAC TROMETHAMINE 30 MG/ML
INJECTION, SOLUTION INTRAMUSCULAR; INTRAVENOUS PRN
Status: DISCONTINUED | OUTPATIENT
Start: 2021-02-03 | End: 2021-02-03 | Stop reason: SDUPTHER

## 2021-02-03 RX ORDER — LIDOCAINE HYDROCHLORIDE 10 MG/ML
INJECTION, SOLUTION EPIDURAL; INFILTRATION; INTRACAUDAL; PERINEURAL PRN
Status: DISCONTINUED | OUTPATIENT
Start: 2021-02-03 | End: 2021-02-03 | Stop reason: SDUPTHER

## 2021-02-03 RX ORDER — KETAMINE HYDROCHLORIDE 10 MG/ML
INJECTION, SOLUTION INTRAMUSCULAR; INTRAVENOUS PRN
Status: DISCONTINUED | OUTPATIENT
Start: 2021-02-03 | End: 2021-02-03 | Stop reason: SDUPTHER

## 2021-02-03 RX ORDER — METOCLOPRAMIDE HYDROCHLORIDE 5 MG/ML
10 INJECTION INTRAMUSCULAR; INTRAVENOUS
Status: DISCONTINUED | OUTPATIENT
Start: 2021-02-03 | End: 2021-02-03 | Stop reason: HOSPADM

## 2021-02-03 RX ORDER — SODIUM CHLORIDE 0.9 % (FLUSH) 0.9 %
10 SYRINGE (ML) INJECTION PRN
Status: DISCONTINUED | OUTPATIENT
Start: 2021-02-03 | End: 2021-02-03 | Stop reason: HOSPADM

## 2021-02-03 RX ORDER — HYDRALAZINE HYDROCHLORIDE 20 MG/ML
5 INJECTION INTRAMUSCULAR; INTRAVENOUS EVERY 10 MIN PRN
Status: DISCONTINUED | OUTPATIENT
Start: 2021-02-03 | End: 2021-02-03 | Stop reason: HOSPADM

## 2021-02-03 RX ORDER — PROPOFOL 10 MG/ML
INJECTION, EMULSION INTRAVENOUS CONTINUOUS PRN
Status: DISCONTINUED | OUTPATIENT
Start: 2021-02-03 | End: 2021-02-03 | Stop reason: SDUPTHER

## 2021-02-03 RX ORDER — SODIUM CHLORIDE, SODIUM LACTATE, POTASSIUM CHLORIDE, CALCIUM CHLORIDE 600; 310; 30; 20 MG/100ML; MG/100ML; MG/100ML; MG/100ML
INJECTION, SOLUTION INTRAVENOUS CONTINUOUS
Status: DISCONTINUED | OUTPATIENT
Start: 2021-02-03 | End: 2021-02-03 | Stop reason: HOSPADM

## 2021-02-03 RX ORDER — HYDROCODONE BITARTRATE AND ACETAMINOPHEN 5; 325 MG/1; MG/1
1 TABLET ORAL PRN
Status: COMPLETED | OUTPATIENT
Start: 2021-02-03 | End: 2021-02-03

## 2021-02-03 RX ORDER — OXYCODONE HYDROCHLORIDE AND ACETAMINOPHEN 5; 325 MG/1; MG/1
1 TABLET ORAL EVERY 6 HOURS PRN
Qty: 28 TABLET | Refills: 0 | Status: SHIPPED | OUTPATIENT
Start: 2021-02-03 | End: 2021-02-10

## 2021-02-03 RX ORDER — FENTANYL CITRATE 50 UG/ML
25 INJECTION, SOLUTION INTRAMUSCULAR; INTRAVENOUS EVERY 5 MIN PRN
Status: DISCONTINUED | OUTPATIENT
Start: 2021-02-03 | End: 2021-02-03 | Stop reason: HOSPADM

## 2021-02-03 RX ORDER — ONDANSETRON 2 MG/ML
INJECTION INTRAMUSCULAR; INTRAVENOUS PRN
Status: DISCONTINUED | OUTPATIENT
Start: 2021-02-03 | End: 2021-02-03 | Stop reason: SDUPTHER

## 2021-02-03 RX ORDER — GABAPENTIN 300 MG/1
CAPSULE ORAL
Qty: 90 CAPSULE | Refills: 1 | Status: SHIPPED | OUTPATIENT
Start: 2021-02-03 | End: 2021-04-05

## 2021-02-03 RX ORDER — PROPOFOL 10 MG/ML
INJECTION, EMULSION INTRAVENOUS PRN
Status: DISCONTINUED | OUTPATIENT
Start: 2021-02-03 | End: 2021-02-03 | Stop reason: SDUPTHER

## 2021-02-03 RX ORDER — MIDAZOLAM HYDROCHLORIDE 1 MG/ML
INJECTION INTRAMUSCULAR; INTRAVENOUS PRN
Status: DISCONTINUED | OUTPATIENT
Start: 2021-02-03 | End: 2021-02-03 | Stop reason: SDUPTHER

## 2021-02-03 RX ORDER — LIDOCAINE HYDROCHLORIDE 10 MG/ML
1 INJECTION, SOLUTION EPIDURAL; INFILTRATION; INTRACAUDAL; PERINEURAL
Status: DISCONTINUED | OUTPATIENT
Start: 2021-02-03 | End: 2021-02-03 | Stop reason: HOSPADM

## 2021-02-03 RX ORDER — GLYCOPYRROLATE 1 MG/5 ML
SYRINGE (ML) INTRAVENOUS PRN
Status: DISCONTINUED | OUTPATIENT
Start: 2021-02-03 | End: 2021-02-03 | Stop reason: SDUPTHER

## 2021-02-03 RX ORDER — FENTANYL CITRATE 50 UG/ML
50 INJECTION, SOLUTION INTRAMUSCULAR; INTRAVENOUS EVERY 5 MIN PRN
Status: DISCONTINUED | OUTPATIENT
Start: 2021-02-03 | End: 2021-02-03 | Stop reason: HOSPADM

## 2021-02-03 RX ORDER — LIDOCAINE HYDROCHLORIDE 10 MG/ML
INJECTION, SOLUTION INFILTRATION; PERINEURAL PRN
Status: DISCONTINUED | OUTPATIENT
Start: 2021-02-03 | End: 2021-02-03 | Stop reason: ALTCHOICE

## 2021-02-03 RX ORDER — HYDROCODONE BITARTRATE AND ACETAMINOPHEN 5; 325 MG/1; MG/1
2 TABLET ORAL PRN
Status: COMPLETED | OUTPATIENT
Start: 2021-02-03 | End: 2021-02-03

## 2021-02-03 RX ORDER — BUPIVACAINE HYDROCHLORIDE 5 MG/ML
INJECTION, SOLUTION EPIDURAL; INTRACAUDAL PRN
Status: DISCONTINUED | OUTPATIENT
Start: 2021-02-03 | End: 2021-02-03 | Stop reason: ALTCHOICE

## 2021-02-03 RX ORDER — ONDANSETRON 2 MG/ML
4 INJECTION INTRAMUSCULAR; INTRAVENOUS
Status: DISCONTINUED | OUTPATIENT
Start: 2021-02-03 | End: 2021-02-03 | Stop reason: HOSPADM

## 2021-02-03 RX ADMIN — LIDOCAINE HYDROCHLORIDE 50 MG: 10 INJECTION, SOLUTION EPIDURAL; INFILTRATION; INTRACAUDAL; PERINEURAL at 07:30

## 2021-02-03 RX ADMIN — ONDANSETRON 4 MG: 2 INJECTION INTRAMUSCULAR; INTRAVENOUS at 07:58

## 2021-02-03 RX ADMIN — FENTANYL CITRATE 50 MCG: 50 INJECTION, SOLUTION INTRAMUSCULAR; INTRAVENOUS at 07:44

## 2021-02-03 RX ADMIN — PROPOFOL 100 MCG/KG/MIN: 10 INJECTION, EMULSION INTRAVENOUS at 07:30

## 2021-02-03 RX ADMIN — HYDROCODONE BITARTRATE AND ACETAMINOPHEN 2 TABLET: 5; 325 TABLET ORAL at 09:21

## 2021-02-03 RX ADMIN — MIDAZOLAM 2 MG: 1 INJECTION INTRAMUSCULAR; INTRAVENOUS at 07:24

## 2021-02-03 RX ADMIN — SODIUM CHLORIDE, POTASSIUM CHLORIDE, SODIUM LACTATE AND CALCIUM CHLORIDE: 600; 310; 30; 20 INJECTION, SOLUTION INTRAVENOUS at 07:13

## 2021-02-03 RX ADMIN — KETAMINE HYDROCHLORIDE 25 MG: 10 INJECTION, SOLUTION INTRAMUSCULAR; INTRAVENOUS at 07:30

## 2021-02-03 RX ADMIN — PROPOFOL 30 MG: 10 INJECTION, EMULSION INTRAVENOUS at 07:44

## 2021-02-03 RX ADMIN — KETOROLAC TROMETHAMINE 30 MG: 30 INJECTION, SOLUTION INTRAMUSCULAR; INTRAVENOUS at 07:58

## 2021-02-03 RX ADMIN — Medication 0.2 MG: at 07:30

## 2021-02-03 RX ADMIN — PROPOFOL 70 MG: 10 INJECTION, EMULSION INTRAVENOUS at 07:30

## 2021-02-03 RX ADMIN — FENTANYL CITRATE 50 MCG: 50 INJECTION, SOLUTION INTRAMUSCULAR; INTRAVENOUS at 07:42

## 2021-02-03 SDOH — HEALTH STABILITY: MENTAL HEALTH: HOW OFTEN DO YOU HAVE A DRINK CONTAINING ALCOHOL?: NOT ASKED

## 2021-02-03 ASSESSMENT — PULMONARY FUNCTION TESTS
PIF_VALUE: 1
PIF_VALUE: 0
PIF_VALUE: 2
PIF_VALUE: 0
PIF_VALUE: 1
PIF_VALUE: 0
PIF_VALUE: 1
PIF_VALUE: 0

## 2021-02-03 ASSESSMENT — ENCOUNTER SYMPTOMS
SHORTNESS OF BREATH: 0
RHINORRHEA: 0
COUGH: 0
STRIDOR: 0
SORE THROAT: 0
WHEEZING: 0

## 2021-02-03 ASSESSMENT — PAIN DESCRIPTION - ORIENTATION: ORIENTATION: RIGHT

## 2021-02-03 ASSESSMENT — PAIN DESCRIPTION - PAIN TYPE: TYPE: SURGICAL PAIN

## 2021-02-03 ASSESSMENT — PAIN - FUNCTIONAL ASSESSMENT: PAIN_FUNCTIONAL_ASSESSMENT: 0-10

## 2021-02-03 ASSESSMENT — PAIN SCALES - GENERAL
PAINLEVEL_OUTOF10: 0
PAINLEVEL_OUTOF10: 5

## 2021-02-03 ASSESSMENT — LIFESTYLE VARIABLES: SMOKING_STATUS: 1

## 2021-02-03 ASSESSMENT — PAIN DESCRIPTION - LOCATION: LOCATION: FOOT

## 2021-02-03 ASSESSMENT — PAIN DESCRIPTION - DESCRIPTORS: DESCRIPTORS: ACHING;BURNING

## 2021-02-03 NOTE — OP NOTE
PATIENT NAME: Susie Brooks  YOB: 1980  -  36 y.o. female  MRN: [id number]  DATE: 2/3/2021  BILLING #: [id number]    Surgeon(s):  Jamil Griffin DPM     ASSISTANTS: Esther Brooks DPM PGY2, Adan Henderson MS3    PRE-OP DIAGNOSIS:   1. Hammertoe deformity 4th digit, right foot  2. Hammertoe deformity 5th digit, right foot    POST-OP DIAGNOSIS: Same as above. PROCEDURE:   1. Flexor tendon tenotomy of 4th digit, right foot  2. Flexor tendon tenotomy of 5th digit, right foot  3. Derotational arthroplasty of 5th PIPJ, right foot    ANESTHESIA: MAC with local (10 cc of 0.5% marcaine plain)    HEMOSTASIS: Pneumatic ankle right tourniquet @ 250 mmHg for 22 minutes. ESTIMATED BLOOD LOSS: Less than 5cc. MATERIALS: 4-0 nylon  * No implants in log *    INJECTABLES: 5 cc 1% lidocaine plain    SPECIMEN: none  * No specimens in log *    COMPLICATIONS: none    FINDINGS: 4th and 5th digits now in rectus position. Deformity reduced    The patient was counseled at length about the risks of idalia Covid-19 during their perioperative period and any recovery window from their procedure. The patient was made aware that idalia Covid-19  may worsen their prognosis for recovering from their procedure  and lend to a higher morbidity and/or mortality risk. All material risks, benefits, and reasonable alternatives including postponing the procedure were discussed. The patient does wish to proceed with the procedure at this time. INDICATIONS FOR PROCEDURE: Susie Brooks is a 36 y.o. female well known to Dr. Jaycee Jacinto with a CC of painful right 4th and 5th hammertoes. Patient has failed conservative treatment and surgical treatment has been recommended to which the patient is amenable. In pre-op all risks and benefits  of the procedure were discussed at length prior to the patient signing the consent form . Consent is signed, witnessed, and placed in patient chart.  The right foot was marked, labs and images reviewed, NPO status confirmed. No guarantees were given or implied. PROCEDURE IN DETAIL: The patient was brought to the operating room and placed on the operating table in the supine position with a safety strap across the lap. A well-padded pneumatic ankle tourniquet was applied to the right ankle. After adequate sedation was given by the anesthesia team, a local block of 10 cc of 0.5% Marcaine plain was injected to the right foot preoperatively. The surgical site was then scrubbed, prepped, and draped in the usual aseptic manner. A timeout was performed confirming the patient's identity, correct site, correct procedure, allergies, and preoperative antibiotics. An Esmarch bandage was then used to exsanguinate right foot and the tourniquet was inflated to 250 mmHg. Attention was directed to the contracted right fourth digit. At the medial plantar aspect of the PIPJ of the fourth digit a stab incision was created with a #15 blade until a flexor tendon was encountered. The blade then transected the flexor tendon both medially and plantarly. The digit was noted to be in a more rectus position. The same procedure was performed for the right 5th digit. At this time, it was noted the patient continued to feel pain. 5 cc of 1% lidocaine was injected to the right foot. Attention was then directed to the dorsal aspect of the right 5th digit. A reducible adductovarus deformity was noted. Using a #15 blade, a dorsal eliptical incision was made from dorsal lateral proximal to dorsal distal medial over the proximal interphalangeal joint of the 5th digit. Incision was deepened until the proximal interphalangeal joint line was identified. Next, the extensor digitorum longus tendon was transected at the joint and was reflected back to visualize the head of the proximal phalanx. The head of the proximal phalanx was then freed of surrounding soft tissues.  A sagital saw was then used to resect the head of the proximal phalanx, taking care to avoid the deep flexor tendon. The head of the proximal phalanx was then removed and passed from the table. The surgical site was then irrigated with copious amounts of normal saline. Skin was re-approximated at all incision sites with 4-0 Nylon. Digits 4 and 5 were noted to be in rectus position. The incisions were dressed with adaptic, 4x4s, kerlix, and ACE. The tourniquet was then deflated after being inflated for 22 minutes and immediate hyperemia returned to all digits. The patient tolerated the procedure and anesthesia well and was transferred to the PACU with all vital signs stable and vascular status intact to the right foot and ankle. Following a period of postoperative monitoring, the patient will be discharged to home and given instructions and prescriptions which were discussed prior to the surgery. Patient will be weightbearing as tolerated in a surgical shoe. Instructed to keep dressing clean, dry, and intact until follow up with Dr. Jeanne Arana.        Roxana Rivero DPM   Podiatric Medicine & Surgery   2/3/2021

## 2021-02-03 NOTE — ANESTHESIA POSTPROCEDURE EVALUATION
POST- ANESTHESIA EVALUATION       Pt Name: Rachell Law  MRN: [id number]  YOB: 1980  Date of evaluation: 2/3/2021  Time:  9:48 AM      BP (!) 120/56   Pulse 59   Temp 97.1 °F (36.2 °C)   Resp 16   Ht 5' 6\" (1.676 m)   Wt 200 lb (90.7 kg)   LMP 01/08/2021   SpO2 100%   BMI 32.28 kg/m²      Consciousness Level  Awake  Cardiopulmonary Status  Stable  Pain Adequately Treated YES  Nausea / Vomiting  NO  Adequate Hydration  YES  Anesthesia Related Complications NONE      Electronically signed by Ronak Rico MD on 2/3/2021 at 9:48 AM       Department of Anesthesiology  Postprocedure Note    Patient: Rachell Law  MRN: [id number]  YOB: 1980  Date of evaluation: 2/3/2021  Time:  9:48 AM     Procedure Summary     Date: 02/03/21 Room / Location: [address] [place] [address]  03 / [facility]    Anesthesia Start: 0281 Anesthesia Stop: 6279    Procedure: TOE HAMMER REPAIR 4th & 5th TOES (Right Foot) Diagnosis: (4TH & 5TH RIGHT HAMMERTOES)    Surgeons: Santiago Arias DPM Responsible Provider: Ronak Rico MD    Anesthesia Type: MAC ASA Status: 2          Anesthesia Type: MAC    Luisito Phase I: Luisito Score: 9    Luisito Phase II:      Last vitals: Reviewed and per EMR flowsheets.        Anesthesia Post Evaluation

## 2021-02-03 NOTE — ANESTHESIA PRE PROCEDURE
Department of Anesthesiology  Preprocedure Note       Name:  Donavon Ochoa   Age:  36 y.o.  :  1980                                          MRN:  [id number]         Date:  2/3/2021      Surgeon: Kimberly Sharma):  Pau Orlando, CAROLYN    Procedure: Procedure(s):  TOE HAMMER REPAIR 4th & 5th TOES    Medications prior to admission:   Prior to Admission medications    Medication Sig Start Date End Date Taking? Authorizing Provider   gabapentin (NEURONTIN) 300 MG capsule take 1 capsule by mouth three times a day 20  Latoya Garay MD   ARIPiprazole (ABILIFY) 5 MG tablet take 1 tablet by mouth nightly 20   Latoya Garay MD   lidocaine (XYLOCAINE) 5 % ointment apply topically if needed 10/13/20   Latoya Garay MD       Current medications:    Current Facility-Administered Medications   Medication Dose Route Frequency Provider Last Rate Last Admin    lactated ringers infusion   Intravenous Continuous Timblin MD John        sodium chloride flush 0.9 % injection 10 mL  10 mL Intravenous 2 times per day Gorge Menjivar MD        sodium chloride flush 0.9 % injection 10 mL  10 mL Intravenous PRN Timblin MD John        lidocaine PF 1 % injection 1 mL  1 mL Intradermal Once PRN Gorge Menjivar MD           Allergies: Allergies   Allergen Reactions    Sulfamethoxazole-Trimethoprim      dont remember RX       Problem List:    Patient Active Problem List   Diagnosis Code    [facility] T14.91XA    Bipolar 1 disorder ([facility] F31.9    Postnasal drip R09.82    Marijuana abuse F12.10    Obesity (BMI 30.0-34. 9) E66.9    Closed nondisplaced oblique fracture of shaft of left ulna S52.235A    Closed nondisplaced oblique fracture of shaft of ulna with routine healing S52.236D    Closed fracture of lower end of right radius with routine healing S52.501D    Fibromyalgia M79.7    Depression with suicidal ideation F32.9, R45.851    Polysubstance abuse ([facility] F19.10  Bipolar II disorder ([facility]) F31.81    Right sided abdominal pain R10.9    Constipation K59.00       Past Medical History:        Diagnosis Date    ADD (attention deficit disorder)     ADHD (attention deficit hyperactivity disorder)     Bipolar 1 disorder (HCC)     Bronchitis     Constipation     Depression     Diarrhea     Difficulty swallowing     Dizziness     Fibromyalgia     Fibromyalgia     Headache     HLD (hyperlipidemia)     IBS (irritable bowel syndrome)     MRSA (methicillin resistant staph aureus) culture positive     Nausea & vomiting     Nervously anxious     SOB (shortness of breath)     Thyroid disorder     Wears glasses     Weight gain        Past Surgical History:        Procedure Laterality Date    DILATION AND CURETTAGE OF UTERUS      EXCISION / BIOPSY SKIN LESION OF LEG Left 2/24/2017    EXCISION MEDIAL THIGH LESION performed by Kong Ovalles IV, DO at [address] [facility]      2 cone biopsys    PRE-MALIGNANT / BENIGN SKIN LESION EXCISION Left 02/24/2017    inner thigh     TONSILLECTOMY      adenoids       Social History:    Social History     Tobacco Use    Smoking status: Current Every Day Smoker    Smokeless tobacco: Never Used    Tobacco comment: Marijuana   Substance Use Topics    Alcohol use: Yes     Comment: Occasional                                Ready to quit: Not Answered  Counseling given: Not Answered  Comment: Marijuana      Vital Signs (Current): There were no vitals filed for this visit.                                            BP Readings from Last 3 Encounters:   04/01/20 114/75   02/11/20 103/73   12/02/19 114/72       NPO Status:                                                                                 BMI:   Wt Readings from Last 3 Encounters:   01/20/21 200 lb (90.7 kg)   12/01/20 200 lb (90.7 kg)   05/06/20 193 lb (87.5 kg)     There is no height or weight on file to calculate BMI.    CBC:   Lab Results Component Value Date    WBC 6.1 07/02/2020    RBC 4.12 07/02/2020    HGB 13.2 07/02/2020    HCT 38.0 07/02/2020    MCV 92.1 07/02/2020    RDW 12.7 07/02/2020     07/02/2020       CMP:   Lab Results   Component Value Date     07/02/2020    K 4.4 07/02/2020     07/02/2020    CO2 25 07/02/2020    BUN 9 07/02/2020    CREATININE 0.78 07/02/2020    GFRAA >60 07/02/2020    LABGLOM >60 07/02/2020    GLUCOSE 110 07/02/2020    PROT 6.5 07/02/2020    CALCIUM 8.7 07/02/2020    BILITOT 0.16 07/02/2020    ALKPHOS 36 07/02/2020    AST 13 07/02/2020    ALT 13 07/02/2020       POC Tests: No results for input(s): POCGLU, POCNA, POCK, POCCL, POCBUN, POCHEMO, POCHCT in the last 72 hours.     Coags: No results found for: PROTIME, INR, APTT    HCG (If Applicable):   Lab Results   Component Value Date    PREGTESTUR NEGATIVE 07/02/2020    HCG NEGATIVE 02/24/2017        ABGs: No results found for: PHART, PO2ART, ZVD2XAF, UCE7VUR, BEART, Z7VUJAHC     Type & Screen (If Applicable):  No results found for: LABABO, LABRH    Drug/Infectious Status (If Applicable):  Lab Results   Component Value Date    HEPCAB NONREACTIVE 10/18/2013       COVID-19 Screening (If Applicable):   Lab Results   Component Value Date    COVID19 Not Detected 01/30/2021         Anesthesia Evaluation  Patient summary reviewed and Nursing notes reviewed no history of anesthetic complications:   Airway: Mallampati: II  TM distance: >3 FB     Mouth opening: > = 3 FB Dental: normal exam         Pulmonary: breath sounds clear to auscultation  (+) current smoker    (-) rhonchi, wheezes, rales and stridor                           Cardiovascular:Negative CV ROS        (-) murmur, weak pulses,  friction rub, systolic click, carotid bruit,  JVD and peripheral edema    ECG reviewed  Rhythm: regular  Rate: normal                 ROS comment: EKG : NSR     Neuro/Psych:   (+) neuromuscular disease:, headaches:, psychiatric history: stable with treatment ROS comment: Bipolar 1 disorder  GI/Hepatic/Renal: Neg GI/Hepatic/Renal ROS            Endo/Other: Negative Endo/Other ROS                    Abdominal:           Vascular: negative vascular ROS. Anesthesia Plan      MAC     ASA 2       Induction: intravenous. MIPS: Postoperative opioids intended and Prophylactic antiemetics administered. Anesthetic plan and risks discussed with patient. Plan discussed with CRNA.                   Fara Medina MD   2/3/2021
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
## 2021-02-08 ENCOUNTER — OFFICE VISIT (OUTPATIENT)
Dept: PODIATRY | Age: 41
End: 2021-02-08

## 2021-02-08 VITALS — WEIGHT: 200 LBS | HEIGHT: 66 IN | BODY MASS INDEX: 32.14 KG/M2

## 2021-02-08 DIAGNOSIS — M79.674 PAIN IN TOE OF RIGHT FOOT: ICD-10-CM

## 2021-02-08 DIAGNOSIS — M20.41 HAMMER TOE OF RIGHT FOOT: Primary | ICD-10-CM

## 2021-02-08 PROCEDURE — 99024 POSTOP FOLLOW-UP VISIT: CPT | Performed by: PODIATRIST

## 2021-02-08 ASSESSMENT — ENCOUNTER SYMPTOMS
COLOR CHANGE: 0
SHORTNESS OF BREATH: 0
NAUSEA: 0
BACK PAIN: 0
DIARRHEA: 0

## 2021-02-08 NOTE — PROGRESS NOTES
Subjective: Patient presents to the office today status-post hammertoe repair toes four and five right foot. Patient states they have kept the dressing to the right lower extremity clean, dry, and intact since the surgery. Patient states that she has worn her post op shoe on her right foot when weightbearing since her surgery. Patient states that their pain has been well-controlled with the prescribed pain medication. Patient states that they have been taking their oral antibiotics as prescribed. Patient denies any fever, chills, nausea, vomiting, or night sweats. Review of Systems   Constitutional: Negative for activity change, appetite change, chills, diaphoresis, fatigue and fever. Respiratory: Negative for shortness of breath. Cardiovascular: Negative for leg swelling. Gastrointestinal: Negative for diarrhea and nausea. Endocrine: Negative for cold intolerance, heat intolerance and polyuria. Musculoskeletal: Positive for gait problem and joint swelling. Negative for arthralgias, back pain and myalgias. Skin: Positive for wound. Negative for color change, pallor and rash. Allergic/Immunologic: Negative for environmental allergies and food allergies. Neurological: Negative for dizziness, weakness, light-headedness and numbness. Hematological: Does not bruise/bleed easily. Psychiatric/Behavioral: Negative for behavioral problems, confusion and self-injury. The patient is not nervous/anxious.

## 2021-02-23 ENCOUNTER — OFFICE VISIT (OUTPATIENT)
Dept: PODIATRY | Age: 41
End: 2021-02-23

## 2021-02-23 VITALS — WEIGHT: 200 LBS | HEIGHT: 66 IN | BODY MASS INDEX: 32.14 KG/M2

## 2021-02-23 DIAGNOSIS — M79.674 PAIN IN TOE OF RIGHT FOOT: ICD-10-CM

## 2021-02-23 DIAGNOSIS — M20.41 HAMMER TOE OF RIGHT FOOT: Primary | ICD-10-CM

## 2021-02-23 DIAGNOSIS — Z98.890 POST-OPERATIVE STATE: ICD-10-CM

## 2021-02-23 PROCEDURE — 99024 POSTOP FOLLOW-UP VISIT: CPT | Performed by: PODIATRIST

## 2021-02-23 ASSESSMENT — ENCOUNTER SYMPTOMS
SHORTNESS OF BREATH: 0
COLOR CHANGE: 0
NAUSEA: 0
DIARRHEA: 0
BACK PAIN: 0

## 2021-02-23 NOTE — PROGRESS NOTES
Subjective: Patient presents to the office today for their second post-operative evaluation of hammertoe repair right fourth and fifth toes. Patient states that they have kept the dressing to the right lower extremity clean, dry, and intact since last visit. Patient states that she has been wearing her post op shoe on her right foot when weightbearing as instructed since last visit. Patient states that their pain continues to be well controlled with the prescribed pain medication. Review of Systems   Constitutional: Negative for activity change, appetite change, chills, diaphoresis, fatigue and fever. Respiratory: Negative for shortness of breath. Cardiovascular: Negative for leg swelling. Gastrointestinal: Negative for diarrhea and nausea. Endocrine: Negative for cold intolerance, heat intolerance and polyuria. Musculoskeletal: Positive for gait problem and joint swelling. Negative for arthralgias, back pain and myalgias. Skin: Positive for wound. Negative for color change, pallor and rash. Allergic/Immunologic: Negative for environmental allergies and food allergies. Neurological: Negative for dizziness, weakness, light-headedness and numbness. Hematological: Does not bruise/bleed easily. Psychiatric/Behavioral: Negative for behavioral problems, confusion and self-injury. The patient is not nervous/anxious.

## 2021-03-15 ENCOUNTER — TELEPHONE (OUTPATIENT)
Dept: FAMILY MEDICINE CLINIC | Age: 41
End: 2021-03-15

## 2021-03-16 ENCOUNTER — OFFICE VISIT (OUTPATIENT)
Dept: FAMILY MEDICINE CLINIC | Age: 41
End: 2021-03-16
Payer: MEDICARE

## 2021-03-16 VITALS
SYSTOLIC BLOOD PRESSURE: 113 MMHG | WEIGHT: 206.6 LBS | DIASTOLIC BLOOD PRESSURE: 80 MMHG | HEART RATE: 66 BPM | HEIGHT: 66 IN | BODY MASS INDEX: 33.2 KG/M2 | TEMPERATURE: 97.6 F | OXYGEN SATURATION: 98 %

## 2021-03-16 DIAGNOSIS — F31.81 BIPOLAR II DISORDER (HCC): ICD-10-CM

## 2021-03-16 DIAGNOSIS — R09.89 SUSPECTED CEREBROVASCULAR ACCIDENT (CVA): Primary | ICD-10-CM

## 2021-03-16 DIAGNOSIS — G44.52 NEW DAILY PERSISTENT HEADACHE: ICD-10-CM

## 2021-03-16 PROCEDURE — 99214 OFFICE O/P EST MOD 30 MIN: CPT | Performed by: NURSE PRACTITIONER

## 2021-03-16 PROCEDURE — 1111F DSCHRG MED/CURRENT MED MERGE: CPT | Performed by: NURSE PRACTITIONER

## 2021-03-16 RX ORDER — ASPIRIN 81 MG/1
81 TABLET ORAL DAILY
COMMUNITY
End: 2022-08-30

## 2021-03-16 RX ORDER — IBUPROFEN 600 MG/1
TABLET ORAL
COMMUNITY
Start: 2021-03-09 | End: 2021-11-18

## 2021-03-16 RX ORDER — ATORVASTATIN CALCIUM 10 MG/1
TABLET, FILM COATED ORAL
COMMUNITY
Start: 2021-03-09 | End: 2022-08-30

## 2021-03-16 SDOH — ECONOMIC STABILITY: TRANSPORTATION INSECURITY
IN THE PAST 12 MONTHS, HAS LACK OF TRANSPORTATION KEPT YOU FROM MEETINGS, WORK, OR FROM GETTING THINGS NEEDED FOR DAILY LIVING?: NOT ASKED

## 2021-03-16 SDOH — ECONOMIC STABILITY: FOOD INSECURITY: WITHIN THE PAST 12 MONTHS, YOU WORRIED THAT YOUR FOOD WOULD RUN OUT BEFORE YOU GOT MONEY TO BUY MORE.: NEVER TRUE

## 2021-03-16 SDOH — ECONOMIC STABILITY: INCOME INSECURITY: HOW HARD IS IT FOR YOU TO PAY FOR THE VERY BASICS LIKE FOOD, HOUSING, MEDICAL CARE, AND HEATING?: NOT HARD AT ALL

## 2021-03-16 ASSESSMENT — ENCOUNTER SYMPTOMS
SINUS PRESSURE: 0
CHEST TIGHTNESS: 0
SINUS PAIN: 0
BACK PAIN: 0
TROUBLE SWALLOWING: 0
SHORTNESS OF BREATH: 0

## 2021-03-16 ASSESSMENT — PATIENT HEALTH QUESTIONNAIRE - PHQ9
SUM OF ALL RESPONSES TO PHQ QUESTIONS 1-9: 2
1. LITTLE INTEREST OR PLEASURE IN DOING THINGS: 1

## 2021-03-16 NOTE — PATIENT INSTRUCTIONS
Patient Education        Headache: Care Instructions  Your Care Instructions     Headaches have many possible causes. Most headaches aren't a sign of a more serious problem, and they will get better on their own. Home treatment may help you feel better faster. The doctor has checked you carefully, but problems can develop later. If you notice any problems or new symptoms, get medical treatment right away. Follow-up care is a key part of your treatment and safety. Be sure to make and go to all appointments, and call your doctor if you are having problems. It's also a good idea to know your test results and keep a list of the medicines you take. How can you care for yourself at home? · Do not drive if you have taken a prescription pain medicine. · Rest in a quiet, dark room until your headache is gone. Close your eyes and try to relax or go to sleep. Don't watch TV or read. · Put a cold, moist cloth or cold pack on the painful area for 10 to 20 minutes at a time. Put a thin cloth between the cold pack and your skin. · Use a warm, moist towel or a heating pad set on low to relax tight shoulder and neck muscles. · Have someone gently massage your neck and shoulders. · Take pain medicines exactly as directed. ? If the doctor gave you a prescription medicine for pain, take it as prescribed. ? If you are not taking a prescription pain medicine, ask your doctor if you can take an over-the-counter medicine. · Be careful not to take pain medicine more often than the instructions allow, because you may get worse or more frequent headaches when the medicine wears off. · Do not ignore new symptoms that occur with a headache, such as a fever, weakness or numbness, vision changes, or confusion. These may be signs of a more serious problem. To prevent headaches  · Keep a headache diary so you can figure out what triggers your headaches. Avoiding triggers may help you prevent headaches.  Record when each headache began, changes. ? Sudden trouble speaking. ? Sudden confusion or trouble understanding simple statements. ? Sudden problems with walking or balance. ? A sudden, severe headache that is different from past headaches. Call your doctor now or seek immediate medical care if:    · You have a new or worse headache.     · Your headache gets much worse. Where can you learn more? Go to https://GetYourGuidepepiceweb.[employer]. org and sign in to your iSSimple account. Enter [id number] in the [employer]et box to learn more about \"Headache: Care Instructions. \"     If you do not have an account, please click on the \"Sign Up Now\" link. Current as of: August 4, 2020               Content Version: 12.8  © 2006-2021 [employer]wise[employer]. Care instructions adapted under license by [facility] ([facility]). If you have questions about a medical condition or this instruction, always ask your healthcare professional. [address] any warranty or liability for your use of this information.

## 2021-03-16 NOTE — PROGRESS NOTES
Post-Discharge Transitional Care Management Services or Hospital Follow Up      Karley Huynh   YOB: 1980    Date of Office Visit:  3/16/2021  Date of Hospital Admission: 3/8/21  Date of Hospital Discharge: 3/10/21    Care management risk score Rising risk (score 2-5) and Complex Care (Scores >=6): 4     Non face to face  following discharge, date last encounter closed (first attempt may have been earlier): *No documented post hospital discharge outreach found in the last 14 days     Call initiated 2 business days of discharge: *No response recorded in the last 14 days    Patient Active Problem List   Diagnosis    [facility]    Bipolar 1 disorder ([facility]    Postnasal drip    Marijuana abuse    Obesity (BMI 30.0-34. 9)    Closed nondisplaced oblique fracture of shaft of left ulna    Closed nondisplaced oblique fracture of shaft of ulna with routine healing    Closed fracture of lower end of right radius with routine healing    Fibromyalgia    Depression with suicidal ideation    [facility]ysubstance abuse ([facility]    [facility]    Right sided abdominal pain    Constipation    Hammertoe of right foot    Pain and swelling of toe of right foot       Allergies   Allergen Reactions    Sulfamethoxazole-Trimethoprim      dont remember RX       Medications listed as ordered at the time of discharge from hospital  Aspirin 81 mg daily   lipitor 10 mg daily (but not taking regularly as she didn't know why she was prescribed it)     Medications marked \"taking\" at this time  Outpatient Medications Marked as Taking for the 3/16/21 encounter (Office Visit) with MATT Daniels CNP   Medication Sig Dispense Refill    aspirin 81 MG EC tablet Take 81 mg by mouth daily      gabapentin (NEURONTIN) 300 MG capsule take 1 capsule by mouth three times a day 90 capsule 1    lidocaine (XYLOCAINE) 5 % ointment apply topically if needed 240 g 1        Medications patient taking as of now reconciled against medications ordered at time of hospital discharge: Yes    Chief Complaint   Patient presents with    Follow-Up from Hospital       HPI    Inpatient course: Discharge summary reviewed- see chart. Interval history/Current status: [facility], underwent imaging, but did not show acute abnormality. She has been having persistent headaches, mostly right sided, and she also fell at home due to right leg weakness following discharge. She has experienced twitching that spontaneously resolves. Her appetite is improving. She is tearful and frustrated. She has ongoing unilateral headaches. She has no history of migraines. She did have home healthcare coming out, but was dismissed as she was improved. She has not followed up with Neurology as she did not know she was supposed to. She was supposed to have MRI, but has chest piercings that need to be removed. Believes appointment on 3/23 for MRI. She is gradually feeling better and would like to get back to work. She would also like to discuss restarting [employer]. Believes she may have had COVID in August. She did not get tested because she felt bad. Patient's daughter, who works at nursing facility, had tested positive around that time. Vitals:    03/16/21 1037   BP: 113/80   Pulse: 66   Temp: 97.6 °F (36.4 °C)   SpO2: 98%   Weight: 206 lb 9.6 oz (93.7 kg)   Height: 5' 6\" (1.676 m)     Body mass index is 33.35 kg/m². Wt Readings from Last 3 Encounters:   03/16/21 206 lb 9.6 oz (93.7 kg)   02/23/21 200 lb (90.7 kg)   02/08/21 200 lb (90.7 kg)     BP Readings from Last 3 Encounters:   03/16/21 113/80   02/03/21 (!) 111/53   02/03/21 115/64       Review of Systems   Constitutional: Negative for activity change, fever and unexpected weight change. HENT: Negative for sinus pressure, sinus pain and trouble swallowing. Respiratory: Negative for chest tightness and shortness of breath.     Cardiovascular: Negative for chest pain, palpitations and leg swelling. Genitourinary: Negative for difficulty urinating. Musculoskeletal: Negative for arthralgias, back pain and gait problem. Neurological: Positive for weakness (right-sided arm/hand and leg but improving), light-headedness (occasional) and headaches. Negative for dizziness, syncope, speech difficulty and numbness. Psychiatric/Behavioral: Positive for sleep disturbance. Negative for decreased concentration, dysphoric mood and self-injury. The patient is nervous/anxious. Physical Exam  Vitals signs reviewed. Constitutional:       Appearance: She is obese. HENT:      Head: Normocephalic and atraumatic. Right Ear: Tympanic membrane, ear canal and external ear normal.      Left Ear: Tympanic membrane, ear canal and external ear normal.      Nose: Nose normal.      Mouth/Throat:      Mouth: Mucous membranes are moist.   Eyes:      General: No visual field deficit. Extraocular Movements: Extraocular movements intact. Conjunctiva/sclera: Conjunctivae normal.      Pupils: Pupils are equal, round, and reactive to light. Neck:      Musculoskeletal: Normal range of motion and neck supple. Cardiovascular:      Rate and Rhythm: Normal rate and regular rhythm. Pulses: Normal pulses. Heart sounds: Normal heart sounds. No murmur. Pulmonary:      Effort: Pulmonary effort is normal. No respiratory distress. Breath sounds: Normal breath sounds. Musculoskeletal: Normal range of motion. General: No swelling. Skin:     General: Skin is warm and dry. Capillary Refill: Capillary refill takes less than 2 seconds. Neurological:      General: No focal deficit present. Mental Status: She is alert and oriented to person, place, and time. Cranial Nerves: No facial asymmetry. Sensory: Sensation is intact. Motor: Weakness present. Coordination: Coordination is intact. Gait: Gait is intact.       Comments: Hand grasp and DPF slightly weaker on right   Psychiatric:         Mood and Affect: Mood normal.         Behavior: Behavior normal.         Thought Content: Thought content normal.         Judgment: Judgment normal.               Assessment/Plan:  1. Suspected cerebrovascular accident (CVA)    - SD DISCHARGE MEDS RECONCILED W/ CURRENT OUTPATIENT MED LIST  - External Referral To Neurology    2. New daily persistent headache    - External Referral To Neurology    3.  Bipolar II disorder [facility])          Medical Decision Making: moderate complexity

## 2021-03-24 ENCOUNTER — TELEPHONE (OUTPATIENT)
Dept: FAMILY MEDICINE CLINIC | Age: 41
End: 2021-03-24

## 2021-03-24 NOTE — TELEPHONE ENCOUNTER
I tried to call the pt now twice. She was just seen at the neuro. He diagnosed her with TIA. MRI brain is pending. If she feels that the weakness is getting worse go to the ED or FU tomorrow in the office again. Thank you.

## 2021-03-24 NOTE — TELEPHONE ENCOUNTER
Pt states she is having right sided weakness and wants to know what to do. I advised pt to go to ER but pt wanted to speak to Christina Culver. Writer told pt that Ashli Beltran is with pt but I would send this note to Dr. Blaine Schultz.

## 2021-03-30 ENCOUNTER — TELEPHONE (OUTPATIENT)
Dept: FAMILY MEDICINE CLINIC | Age: 41
End: 2021-03-30

## 2021-04-03 DIAGNOSIS — M25.531 PAIN, WRIST, RIGHT: ICD-10-CM

## 2021-04-03 DIAGNOSIS — R20.0 NUMBNESS AND TINGLING IN RIGHT HAND: ICD-10-CM

## 2021-04-03 DIAGNOSIS — R20.2 NUMBNESS AND TINGLING IN RIGHT HAND: ICD-10-CM

## 2021-04-05 RX ORDER — GABAPENTIN 300 MG/1
CAPSULE ORAL
Qty: 90 CAPSULE | Refills: 1 | Status: SHIPPED | OUTPATIENT
Start: 2021-04-05 | End: 2021-06-08

## 2021-04-05 RX ORDER — ARIPIPRAZOLE 5 MG/1
5 TABLET ORAL NIGHTLY
Qty: 30 TABLET | Refills: 3 | Status: SHIPPED | OUTPATIENT
Start: 2021-04-05 | End: 2021-09-07

## 2021-04-05 NOTE — TELEPHONE ENCOUNTER
Janae Lara is calling to request a refill on the following medication(s):    Last Visit Date (If Applicable):  5/0/1411    Next Visit Date:    Visit date not found    Medication Request:  Requested Prescriptions     Pending Prescriptions Disp Refills    ARIPiprazole (ABILIFY) 5 MG tablet [Pharmacy Med Name: ARIPIPRAZOLE 5 MG TABLET] 30 tablet 3     Sig: take 1 tablet by mouth nightly    gabapentin (NEURONTIN) 300 MG capsule [Pharmacy Med Name: GABAPENTIN 300 MG CAPSULE] 90 capsule 1     Sig: take 1 capsule by mouth three times a day

## 2021-04-29 ENCOUNTER — OFFICE VISIT (OUTPATIENT)
Dept: FAMILY MEDICINE CLINIC | Age: 41
End: 2021-04-29
Payer: MEDICARE

## 2021-04-29 VITALS
OXYGEN SATURATION: 99 % | SYSTOLIC BLOOD PRESSURE: 119 MMHG | DIASTOLIC BLOOD PRESSURE: 78 MMHG | TEMPERATURE: 99 F | BODY MASS INDEX: 33.35 KG/M2 | HEART RATE: 56 BPM | WEIGHT: 206.6 LBS

## 2021-04-29 DIAGNOSIS — E66.09 CLASS 1 OBESITY DUE TO EXCESS CALORIES WITHOUT SERIOUS COMORBIDITY WITH BODY MASS INDEX (BMI) OF 30.0 TO 30.9 IN ADULT: ICD-10-CM

## 2021-04-29 PROCEDURE — 1036F TOBACCO NON-USER: CPT | Performed by: FAMILY MEDICINE

## 2021-04-29 PROCEDURE — 99213 OFFICE O/P EST LOW 20 MIN: CPT | Performed by: FAMILY MEDICINE

## 2021-04-29 PROCEDURE — G8427 DOCREV CUR MEDS BY ELIG CLIN: HCPCS | Performed by: FAMILY MEDICINE

## 2021-04-29 PROCEDURE — G8417 CALC BMI ABV UP PARAM F/U: HCPCS | Performed by: FAMILY MEDICINE

## 2021-04-29 RX ORDER — PHENTERMINE HYDROCHLORIDE 37.5 MG/1
37.5 TABLET ORAL
Qty: 30 TABLET | Refills: 0 | Status: SHIPPED | OUTPATIENT
Start: 2021-04-29 | End: 2021-05-29

## 2021-04-29 ASSESSMENT — PATIENT HEALTH QUESTIONNAIRE - PHQ9
SUM OF ALL RESPONSES TO PHQ QUESTIONS 1-9: 0
SUM OF ALL RESPONSES TO PHQ QUESTIONS 1-9: 0
1. LITTLE INTEREST OR PLEASURE IN DOING THINGS: 0
SUM OF ALL RESPONSES TO PHQ QUESTIONS 1-9: 0
SUM OF ALL RESPONSES TO PHQ9 QUESTIONS 1 & 2: 0
2. FEELING DOWN, DEPRESSED OR HOPELESS: 0

## 2021-04-29 NOTE — PROGRESS NOTES
General FM note    Mikie Montalvo is a 36 y.o. female who presents today for follow up on her  medical conditions as noted below. Mikie Montalvo is c/o of   Chief Complaint   Patient presents with    Medication Refill     adipex       Patient Active Problem List:     Suicide attempt [facility])     Bipolar 1 disorder ([facility]     Postnasal drip     Marijuana abuse     Obesity (BMI 30.0-34. 9)     Closed nondisplaced oblique fracture of shaft of left ulna     Closed nondisplaced oblique fracture of shaft of ulna with routine healing     Closed fracture of lower end of right radius with routine healing     Fibromyalgia     Depression with suicidal ideation     Polysubstance abuse ([facility]     Bipolar II disorder (HCC)     Right sided abdominal pain     Constipation     Hammertoe of right foot     Pain and swelling of toe of right foot     Past Medical History:   Diagnosis Date    ADD (attention deficit disorder)     ADHD (attention deficit hyperactivity disorder)     Bipolar 1 disorder (HCC)     Bronchitis     Constipation     Depression     Diarrhea     Difficulty swallowing     Dizziness     Fibromyalgia     Fibromyalgia     Hammertoe of right foot     Headache     HLD (hyperlipidemia)     IBS (irritable bowel syndrome)     MRSA (methicillin resistant staph aureus) culture positive     Nausea & vomiting     Nervously anxious     SOB (shortness of breath)     Thyroid disorder     Wears glasses     Weight gain       Past Surgical History:   Procedure Laterality Date    DILATION AND CURETTAGE OF UTERUS      EXCISION / BIOPSY SKIN LESION OF LEG Left 2/24/2017    EXCISION MEDIAL THIGH LESION performed by Katerina Ovalles IV, DO at [facility] TOE SURGERY Right 2/3/2021    TOE HAMMER REPAIR 4th & 5th TOES performed by Hari Santa DPM at [address] [place] [address]      2 cone biopsys    PRE-MALIGNANT / BENIGN SKIN LESION EXCISION Left 02/24/2017    inner thigh     TONSILLECTOMY Radha Salgado MD on 4/29/2021 at 4:18 PM       (Please note that portions of this note were completed with a voice recognition program. Efforts were made to edit the dictations but occasionally words are mis-transcribed.)

## 2021-06-08 DIAGNOSIS — R20.2 NUMBNESS AND TINGLING IN RIGHT HAND: ICD-10-CM

## 2021-06-08 DIAGNOSIS — M25.531 PAIN, WRIST, RIGHT: ICD-10-CM

## 2021-06-08 DIAGNOSIS — R20.0 NUMBNESS AND TINGLING IN RIGHT HAND: ICD-10-CM

## 2021-06-08 RX ORDER — GABAPENTIN 300 MG/1
CAPSULE ORAL
Qty: 90 CAPSULE | Refills: 1 | Status: SHIPPED | OUTPATIENT
Start: 2021-06-08 | End: 2021-09-07

## 2021-06-08 NOTE — TELEPHONE ENCOUNTER
Ivan Geronimo is calling to request a refill on the following medication(s):    Last Visit Date (If Applicable):  9/39/0343    Next Visit Date:    Visit date not found    Medication Request:  Requested Prescriptions     Pending Prescriptions Disp Refills    gabapentin (NEURONTIN) 300 MG capsule [Pharmacy Med Name: GABAPENTIN 300 MG CAPSULE] 90 capsule 1     Sig: take 1 capsule by mouth three times a day

## 2021-07-12 ENCOUNTER — APPOINTMENT (OUTPATIENT)
Dept: CT IMAGING | Age: 41
End: 2021-07-12
Payer: MEDICARE

## 2021-07-12 ENCOUNTER — HOSPITAL ENCOUNTER (EMERGENCY)
Age: 41
Discharge: HOME | End: 2021-07-12
Attending: EMERGENCY MEDICINE
Payer: MEDICARE

## 2021-07-12 VITALS
DIASTOLIC BLOOD PRESSURE: 80 MMHG | BODY MASS INDEX: 28.93 KG/M2 | RESPIRATION RATE: 22 BRPM | WEIGHT: 180 LBS | HEART RATE: 71 BPM | OXYGEN SATURATION: 100 % | HEIGHT: 66 IN | SYSTOLIC BLOOD PRESSURE: 127 MMHG | TEMPERATURE: 98 F

## 2021-07-12 DIAGNOSIS — R10.84 GENERALIZED ABDOMINAL PAIN: Primary | ICD-10-CM

## 2021-07-12 LAB
-: ABNORMAL
ABSOLUTE EOS #: 0.39 K/UL (ref 0–0.44)
ABSOLUTE IMMATURE GRANULOCYTE: <0.03 K/UL (ref 0–0.3)
ABSOLUTE LYMPH #: 2.41 K/UL (ref 1.1–3.7)
ABSOLUTE MONO #: 0.67 K/UL (ref 0.1–1.2)
ALBUMIN SERPL-MCNC: 3.9 G/DL (ref 3.5–5.2)
ALBUMIN/GLOBULIN RATIO: 1.2 (ref 1–2.5)
ALP BLD-CCNC: 53 U/L (ref 35–104)
ALT SERPL-CCNC: 17 U/L (ref 5–33)
AMORPHOUS: ABNORMAL
ANION GAP SERPL CALCULATED.3IONS-SCNC: 13 MMOL/L (ref 9–17)
AST SERPL-CCNC: 28 U/L
BACTERIA: ABNORMAL
BASOPHILS # BLD: 1 % (ref 0–2)
BASOPHILS ABSOLUTE: 0.06 K/UL (ref 0–0.2)
BILIRUB SERPL-MCNC: 0.2 MG/DL (ref 0.3–1.2)
BILIRUBIN URINE: NEGATIVE
BUN BLDV-MCNC: 17 MG/DL (ref 6–20)
BUN/CREAT BLD: ABNORMAL (ref 9–20)
C TRACH DNA GENITAL QL NAA+PROBE: NEGATIVE
CALCIUM SERPL-MCNC: 9.1 MG/DL (ref 8.6–10.4)
CASTS UA: ABNORMAL /LPF (ref 0–2)
CHLORIDE BLD-SCNC: 107 MMOL/L (ref 98–107)
CO2: 21 MMOL/L (ref 20–31)
COLOR: YELLOW
COMMENT UA: ABNORMAL
CREAT SERPL-MCNC: 0.67 MG/DL (ref 0.5–0.9)
CRYSTALS, UA: ABNORMAL /HPF
DIFFERENTIAL TYPE: ABNORMAL
DIRECT EXAM: ABNORMAL
EOSINOPHILS RELATIVE PERCENT: 6 % (ref 1–4)
EPITHELIAL CELLS UA: ABNORMAL /HPF (ref 0–5)
GFR AFRICAN AMERICAN: >60 ML/MIN
GFR NON-AFRICAN AMERICAN: >60 ML/MIN
GFR SERPL CREATININE-BSD FRML MDRD: ABNORMAL ML/MIN/{1.73_M2}
GFR SERPL CREATININE-BSD FRML MDRD: ABNORMAL ML/MIN/{1.73_M2}
GLUCOSE BLD-MCNC: 107 MG/DL (ref 70–99)
GLUCOSE URINE: NEGATIVE
HCG QUALITATIVE: NEGATIVE
HCT VFR BLD CALC: 34.6 % (ref 36.3–47.1)
HEMOGLOBIN: 11.9 G/DL (ref 11.9–15.1)
IMMATURE GRANULOCYTES: 0 %
KETONES, URINE: NEGATIVE
LEUKOCYTE ESTERASE, URINE: NEGATIVE
LIPASE: 22 U/L (ref 13–60)
LYMPHOCYTES # BLD: 35 % (ref 24–43)
Lab: ABNORMAL
MCH RBC QN AUTO: 30.3 PG (ref 25.2–33.5)
MCHC RBC AUTO-ENTMCNC: 34.4 G/DL (ref 28.4–34.8)
MCV RBC AUTO: 88 FL (ref 82.6–102.9)
MONOCYTES # BLD: 10 % (ref 3–12)
MUCUS: ABNORMAL
N. GONORRHOEAE DNA: NEGATIVE
NITRITE, URINE: NEGATIVE
NRBC AUTOMATED: 0 PER 100 WBC
OTHER OBSERVATIONS UA: ABNORMAL
PDW BLD-RTO: 11.9 % (ref 11.8–14.4)
PH UA: 8 (ref 5–8)
PLATELET # BLD: 258 K/UL (ref 138–453)
PLATELET ESTIMATE: ABNORMAL
PMV BLD AUTO: 9.7 FL (ref 8.1–13.5)
POTASSIUM SERPL-SCNC: 3.7 MMOL/L (ref 3.7–5.3)
PROTEIN UA: NEGATIVE
RBC # BLD: 3.93 M/UL (ref 3.95–5.11)
RBC # BLD: ABNORMAL 10*6/UL
RBC UA: ABNORMAL /HPF (ref 0–2)
RENAL EPITHELIAL, UA: ABNORMAL /HPF
SEG NEUTROPHILS: 48 % (ref 36–65)
SEGMENTED NEUTROPHILS ABSOLUTE COUNT: 3.44 K/UL (ref 1.5–8.1)
SODIUM BLD-SCNC: 141 MMOL/L (ref 135–144)
SPECIFIC GRAVITY UA: 1.02 (ref 1–1.03)
SPECIMEN DESCRIPTION: ABNORMAL
SPECIMEN DESCRIPTION: NORMAL
TOTAL PROTEIN: 7.1 G/DL (ref 6.4–8.3)
TRICHOMONAS: ABNORMAL
TURBIDITY: ABNORMAL
URINE HGB: NEGATIVE
UROBILINOGEN, URINE: NORMAL
WBC # BLD: 7 K/UL (ref 3.5–11.3)
WBC # BLD: ABNORMAL 10*3/UL
WBC UA: ABNORMAL /HPF (ref 0–5)
YEAST: ABNORMAL

## 2021-07-12 PROCEDURE — 85025 COMPLETE CBC W/AUTO DIFF WBC: CPT

## 2021-07-12 PROCEDURE — 84703 CHORIONIC GONADOTROPIN ASSAY: CPT

## 2021-07-12 PROCEDURE — 81001 URINALYSIS AUTO W/SCOPE: CPT

## 2021-07-12 PROCEDURE — 87491 CHLMYD TRACH DNA AMP PROBE: CPT

## 2021-07-12 PROCEDURE — 74176 CT ABD & PELVIS W/O CONTRAST: CPT

## 2021-07-12 PROCEDURE — 80053 COMPREHEN METABOLIC PANEL: CPT

## 2021-07-12 PROCEDURE — 87591 N.GONORRHOEAE DNA AMP PROB: CPT

## 2021-07-12 PROCEDURE — 83690 ASSAY OF LIPASE: CPT

## 2021-07-12 PROCEDURE — 2580000003 HC RX 258: Performed by: STUDENT IN AN ORGANIZED HEALTH CARE EDUCATION/TRAINING PROGRAM

## 2021-07-12 PROCEDURE — 96374 THER/PROPH/DIAG INJ IV PUSH: CPT

## 2021-07-12 PROCEDURE — 6360000002 HC RX W HCPCS: Performed by: STUDENT IN AN ORGANIZED HEALTH CARE EDUCATION/TRAINING PROGRAM

## 2021-07-12 PROCEDURE — 99283 EMERGENCY DEPT VISIT LOW MDM: CPT

## 2021-07-12 PROCEDURE — 87660 TRICHOMONAS VAGIN DIR PROBE: CPT

## 2021-07-12 PROCEDURE — 87510 GARDNER VAG DNA DIR PROBE: CPT

## 2021-07-12 PROCEDURE — 87480 CANDIDA DNA DIR PROBE: CPT

## 2021-07-12 RX ORDER — 0.9 % SODIUM CHLORIDE 0.9 %
30 INTRAVENOUS SOLUTION INTRAVENOUS ONCE
Status: COMPLETED | OUTPATIENT
Start: 2021-07-12 | End: 2021-07-12

## 2021-07-12 RX ORDER — METRONIDAZOLE 500 MG/1
500 TABLET ORAL 2 TIMES DAILY
Qty: 14 TABLET | Refills: 0 | Status: SHIPPED | OUTPATIENT
Start: 2021-07-12 | End: 2021-12-03

## 2021-07-12 RX ORDER — KETOROLAC TROMETHAMINE 15 MG/ML
15 INJECTION, SOLUTION INTRAMUSCULAR; INTRAVENOUS ONCE
Status: COMPLETED | OUTPATIENT
Start: 2021-07-12 | End: 2021-07-12

## 2021-07-12 RX ADMIN — SODIUM CHLORIDE 2448 ML: 9 INJECTION, SOLUTION INTRAVENOUS at 04:06

## 2021-07-12 RX ADMIN — KETOROLAC TROMETHAMINE 15 MG: 15 INJECTION, SOLUTION INTRAMUSCULAR; INTRAVENOUS at 04:06

## 2021-07-12 ASSESSMENT — ENCOUNTER SYMPTOMS
DIARRHEA: 0
CONSTIPATION: 0
RECTAL PAIN: 0
COUGH: 0
VOMITING: 0
NAUSEA: 1
ABDOMINAL PAIN: 1
TROUBLE SWALLOWING: 0
WHEEZING: 0
CHEST TIGHTNESS: 0
SHORTNESS OF BREATH: 0
BLOOD IN STOOL: 0

## 2021-07-12 ASSESSMENT — PAIN DESCRIPTION - LOCATION: LOCATION: ABDOMEN;BACK

## 2021-07-12 ASSESSMENT — PAIN SCALES - GENERAL
PAINLEVEL_OUTOF10: 10
PAINLEVEL_OUTOF10: 10

## 2021-07-12 ASSESSMENT — PAIN DESCRIPTION - PAIN TYPE: TYPE: ACUTE PAIN

## 2021-07-12 NOTE — ED NOTES
Pt states her pain is better now. Pt is concerned for STD.  Pt ok with pelvic exam.      Nakul Del Cid RN  07/12/21 1918
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
## 2021-07-12 NOTE — ED NOTES
Pt arrives to ED via self. Pt states she woke up and had to poop when she had sudden RUQ sharp abdominal pain. Pt arrives unable to sit still. Pt guarding her abdomen.  Unable to get vitals initially on arrival.     Davina Rausch RN  07/12/21 2017

## 2021-07-12 NOTE — ED PROVIDER NOTES
FACULTY SIGN-OUT  ADDENDUM     Care of this patient was assumed from previous attending physician. The patient's initial evaluation and plan have been discussed with the prior provider who initially evaluated the patient. Attestation  I was available and discussed any additional care issues that arose and coordinated the management plans with the resident(s) caring for the patient during my duty period. Any areas of disagreement with resident's documentation of care or procedures are noted on the chart. I was personally present for the key portions of any/all procedures, during my duty period. I have documented in the chart those procedures where I was not present during the key portions. ED COURSE      The patient was given the following medications:  Orders Placed This Encounter   Medications    0.9 % sodium chloride IV bolus 2,448 mL    ketorolac (TORADOL) injection 15 mg       RECENT VITALS:   Temp: 98 °F (36.7 °C), Pulse: 71, Resp: 22, BP: 127/80    MEDICAL DECISION MAKING        Nahed Mccann is a 36 y.o. female who presents to the Emergency Department with complaints of flank pain. Await CT and reassess.       Ayush Daley MD  Attending Emergency Physician    (Please note that portions of this note were completed with a voice recognition program.  Efforts were made to edit the dictations but occasionally words are mis-transcribed.)          Ayush Daley MD  07/12/21 1401

## 2021-07-12 NOTE — ED PROVIDER NOTES
[facility] ED  Emergency Department Encounter  EmergencyMedicine Resident     Pt Gricel Mortensen  MRN: [id number]  Marjor[place]fkala 1980  Date of evaluation: 7/12/21  PCP:  Juan Cavanaugh MD    This patient was evaluated in the Emergency Department for symptoms described in the history of present illness. The patient was evaluated in the context of the global COVID-19 pandemic, which necessitated consideration that the patient might be at risk for infection with the SARS-CoV-2 virus that causes COVID-19. Institutional protocols and algorithms that pertain to the evaluation of patients at risk for COVID-19 are in a state of rapid change based on information released by regulatory bodies including the CDC and federal and state organizations. These policies and algorithms were followed during the patient's care in the ED. CHIEF COMPLAINT       Chief Complaint   Patient presents with    Back Pain    Abdominal Pain       HISTORY OF PRESENT ILLNESS  (Location/Symptom, Timing/Onset, Context/Setting, Quality, Duration, Modifying Factors, Severity.)      Emeka Perera is a 36 y.o. female who presents with abdominal pain. Patient states that the pain started earlier today when she tried to have a bowel movement. Patient states that she dropped the toilet did not sustain any trauma and she began to have diffuse abdominal pain without flank pain. She does not member having any blood in her bowel movements, she states that she did not have any discomfort with urination. No vomiting headaches episodes of syncope or other constitutional symptoms. Patient does not report history of fevers chills.     PAST MEDICAL / SURGICAL / SOCIAL / FAMILY HISTORY      has a past medical history of ADD (attention deficit disorder), ADHD (attention deficit hyperactivity disorder), Bipolar 1 disorder ([facility] [address] Bronchitis, Constipation, Depression, Diarrhea, Difficulty swallowing, Dizziness, Fibromyalgia, Fibromyalgia, Hammertoe of right foot, Headache, HLD (hyperlipidemia), IBS (irritable bowel syndrome), MRSA (methicillin resistant staph aureus) culture positive, Nausea & vomiting, Nervously anxious, SOB (shortness of breath), Thyroid disorder, Wears glasses, and Weight gain. has a past surgical history that includes other surgical history; Dilation and curettage of uterus; Tonsillectomy; pre-malignant / benign skin lesion excision (Left, 02/24/2017); EXCISION / BIOPSY SKIN LESION OF LEG (Left, 2/24/2017); and Hammer toe surgery (Right, 2/3/2021). Social History     Socioeconomic History    Marital status:      Spouse name: Not on file    Number of children: Not on file    Years of education: Not on file    Highest education level: Not on file   Occupational History    Not on file   Tobacco Use    Smoking status: Never Smoker    Smokeless tobacco: Never Used    Tobacco comment: Marijuana only never tobacco   Vaping Use    Vaping Use: Never used   Substance and Sexual Activity    Alcohol use: Yes     Comment: Occasional    Drug use: Yes     Comment: marijuana daily last time 2/2/21    Sexual activity: Not on file   Other Topics Concern    Not on file   Social History Narrative    Not on file     Social Determinants of Health     Financial Resource Strain: Low Risk     Difficulty of Paying Living Expenses: Not hard at all   Food Insecurity: No Food Insecurity    Worried About Running Out of Food in the Last Year: Never true    Keith of Food in the Last Year: Never true   Transportation Needs:     Lack of Transportation (Medical):      Lack of Transportation (Non-Medical):    Physical Activity:     Days of Exercise per Week:     Minutes of Exercise per Session:    Stress:     Feeling of Stress :    Social Connections:     Frequency of Communication with Friends and Family:     Frequency of Social Gatherings with Friends and Family:     Attends Orthodox Services:     Active Member of Clubs or Organizations:     Attends Club or Organization Meetings:     Marital Status:    Intimate Partner Violence:     Fear of Current or Ex-Partner:     Emotionally Abused:     Physically Abused:     Sexually Abused:        Family History   Problem Relation Age of Onset    Hypertension Mother     Diabetes Father     Brain Cancer Maternal Grandmother     Heart Defect Maternal Grandfather     Stroke Maternal Aunt        Allergies:  Sulfamethoxazole-trimethoprim    Home Medications:  Prior to Admission medications    Medication Sig Start Date End Date Taking? Authorizing Provider   metroNIDAZOLE (FLAGYL) 500 MG tablet Take 1 tablet by mouth 2 times daily Take with Food. Do NOT drink alcohol. 7/12/21  Yes Nirali Dhaliwal, DO   gabapentin (NEURONTIN) 300 MG capsule take 1 capsule by mouth three times a day 6/8/21 7/8/21  Silvino Armas MD   ARIPiprazole (ABILIFY) 5 MG tablet take 1 tablet by mouth nightly 4/5/21   Silvino Armas MD   atorvastatin (LIPITOR) 10 MG tablet take 1 tablet by mouth at bedtime 3/9/21   Historical Provider, MD   ibuprofen (ADVIL;MOTRIN) 600 MG tablet take 1 tablet by mouth every 6 hours 3/9/21   Historical Provider, MD   aspirin 81 MG EC tablet Take 81 mg by mouth daily    Historical Provider, MD   lidocaine (XYLOCAINE) 5 % ointment apply topically if needed 10/13/20   Silvino Armas MD       REVIEW OF SYSTEMS    (2-9 systems for level 4, 10 or more for level 5)      Review of Systems   Constitutional: Negative for fatigue and fever. HENT: Negative for congestion and trouble swallowing. Eyes: Negative for visual disturbance. Respiratory: Negative for cough, chest tightness, shortness of breath and wheezing. Cardiovascular: Negative for chest pain and leg swelling. Gastrointestinal: Positive for abdominal pain and nausea. Negative for blood in stool, constipation, diarrhea, rectal pain and vomiting.    Genitourinary: Negative for dyspareunia, dysuria, flank pain and menstrual problem. Musculoskeletal: Negative for arthralgias, gait problem and neck stiffness. Neurological: Negative for syncope. PHYSICAL EXAM   (up to 7 for level 4, 8 or more for level 5)      INITIAL VITALS:   /80   Pulse 71   Temp 98 °F (36.7 °C) (Oral)   Resp 22   Ht 5' 6\" (1.676 m)   Wt 180 lb (81.6 kg)   LMP 06/01/2021   SpO2 100%   BMI 29.05 kg/m²     Physical Exam  Exam conducted with a chaperone present. Constitutional:       Appearance: She is well-developed. HENT:      Head: Normocephalic and atraumatic. Mouth/Throat:      Mouth: Mucous membranes are moist.   Cardiovascular:      Rate and Rhythm: Normal rate and regular rhythm. Pulses: Normal pulses. Pulmonary:      Effort: Pulmonary effort is normal.      Breath sounds: Normal breath sounds. Abdominal:      General: Abdomen is flat. Palpations: Abdomen is soft. Tenderness: There is generalized abdominal tenderness. There is no guarding or rebound. Hernia: No hernia is present. Genitourinary:     General: Normal vulva. Exam position: Supine. Labia:         Right: No rash or tenderness. Left: No rash or tenderness. Vagina: Normal. No tenderness. Cervix: Normal.      Uterus: Normal. Not tender and no uterine prolapse. Adnexa: Right adnexa normal.        Right: No tenderness or fullness. Left: No tenderness or fullness. Skin:     General: Skin is warm. Neurological:      Mental Status: She is alert.          DIFFERENTIAL  DIAGNOSIS     PLAN (LABS / IMAGING / EKG):  Orders Placed This Encounter   Procedures    VAGINITIS DNA PROBE    C.trachomatis N.gonorrhoeae DNA    CT ABDOMEN PELVIS WO CONTRAST Additional Contrast? None    CBC Auto Differential    Comprehensive Metabolic Panel w/ Reflex to MG    Lipase    HCG Qualitative, Serum    Urinalysis Reflex to Culture    Microscopic Urinalysis    Vaginal exam    POCT Glucose       MEDICATIONS ORDERED:  Orders Placed This Encounter   Medications    0.9 % sodium chloride IV bolus 2,448 mL    ketorolac (TORADOL) injection 15 mg    metroNIDAZOLE (FLAGYL) 500 MG tablet     Sig: Take 1 tablet by mouth 2 times daily Take with Food. Do NOT drink alcohol. Dispense:  14 tablet     Refill:  0       DDX: BV, gastritis, PID, torsion, nephrolithiasis, ureterolithiasis    DIAGNOSTIC RESULTS / EMERGENCY DEPARTMENT COURSE / MDM   LAB RESULTS:  Results for orders placed or performed during the hospital encounter of 07/12/21   VAGINITIS DNA PROBE    Specimen: Vaginal   Result Value Ref Range    Specimen Description . VAGINA     Special Requests NOT REPORTED     Direct Exam POSITIVE for Gardnerella vaginalis. (A)     Direct Exam NEGATIVE for Candida sp. Direct Exam NEGATIVE for Trichomonas vaginalis     Direct Exam       Method of testing is a DNA probe intended for detection and identification of Candida species, Gardnerella vaginalis, and Trichomonas vaginalis nucleic acid in vaginal fluid specimens from patients with symptoms of vaginitis/vaginosis.    CBC Auto Differential   Result Value Ref Range    WBC 7.0 3.5 - 11.3 k/uL    RBC 3.93 (L) 3.95 - 5.11 m/uL    Hemoglobin 11.9 11.9 - 15.1 g/dL    Hematocrit 34.6 (L) 36.3 - 47.1 %    MCV 88.0 82.6 - 102.9 fL    MCH 30.3 25.2 - 33.5 pg    MCHC 34.4 28.4 - 34.8 g/dL    RDW 11.9 11.8 - 14.4 %    Platelets 433 213 - 725 k/uL    MPV 9.7 8.1 - 13.5 fL    NRBC Automated 0.0 0.0 per 100 WBC    Differential Type NOT REPORTED     Seg Neutrophils 48 36 - 65 %    Lymphocytes 35 24 - 43 %    Monocytes 10 3 - 12 %    Eosinophils % 6 (H) 1 - 4 %    Basophils 1 0 - 2 %    Immature Granulocytes 0 0 %    Segs Absolute 3.44 1.50 - 8.10 k/uL    Absolute Lymph # 2.41 1.10 - 3.70 k/uL    Absolute Mono # 0.67 0.10 - 1.20 k/uL    Absolute Eos # 0.39 0.00 - 0.44 k/uL    Basophils Absolute 0.06 0.00 - 0.20 k/uL    Absolute Immature Granulocyte <0.03 0.00 - 0.30 k/uL    WBC Morphology NOT REPORTED     RBC Morphology NOT REPORTED     Platelet Estimate NOT REPORTED    Comprehensive Metabolic Panel w/ Reflex to MG   Result Value Ref Range    Glucose 107 (H) 70 - 99 mg/dL    BUN 17 6 - 20 mg/dL    CREATININE 0.67 0.50 - 0.90 mg/dL    Bun/Cre Ratio NOT REPORTED 9 - 20    Calcium 9.1 8.6 - 10.4 mg/dL    Sodium 141 135 - 144 mmol/L    Potassium 3.7 3.7 - 5.3 mmol/L    Chloride 107 98 - 107 mmol/L    CO2 21 20 - 31 mmol/L    Anion Gap 13 9 - 17 mmol/L    Alkaline Phosphatase 53 35 - 104 U/L    ALT 17 5 - 33 U/L    AST 28 <32 U/L    Total Bilirubin 0.20 (L) 0.3 - 1.2 mg/dL    Total Protein 7.1 6.4 - 8.3 g/dL    Albumin 3.9 3.5 - 5.2 g/dL    Albumin/Globulin Ratio 1.2 1.0 - 2.5    GFR Non-African American >60 >60 mL/min    GFR African American >60 >60 mL/min    GFR Comment          GFR Staging NOT REPORTED    Lipase   Result Value Ref Range    Lipase 22 13 - 60 U/L   HCG Qualitative, Serum   Result Value Ref Range    hCG Qual NEGATIVE NEGATIVE   Urinalysis Reflex to Culture    Specimen: Urine, clean catch   Result Value Ref Range    Color, UA YELLOW YELLOW    Turbidity UA TURBID (A) CLEAR    Glucose, Ur NEGATIVE NEGATIVE    Bilirubin Urine NEGATIVE NEGATIVE    Ketones, Urine NEGATIVE NEGATIVE    Specific Gravity, UA 1.021 1.005 - 1.030    Urine Hgb NEGATIVE NEGATIVE    pH, UA 8.0 5.0 - 8.0    Protein, UA NEGATIVE NEGATIVE    Urobilinogen, Urine Normal Normal    Nitrite, Urine NEGATIVE NEGATIVE    Leukocyte Esterase, Urine NEGATIVE NEGATIVE    Urinalysis Comments NOT REPORTED    Microscopic Urinalysis   Result Value Ref Range    -          WBC, UA 0 TO 2 0 - 5 /HPF    RBC, UA 0 TO 2 0 - 2 /HPF    Casts UA NOT REPORTED 0 - 2 /LPF    Crystals, UA NOT REPORTED None /HPF    Epithelial Cells UA 0 TO 2 0 - 5 /HPF    Renal Epithelial, UA NOT REPORTED 0 /HPF    Bacteria, UA NOT REPORTED None    Mucus, UA 1+ (A) None    Trichomonas, UA NOT REPORTED None    Amorphous, UA 2+ (A) None    Other Observations UA NOT REPORTED NOT REQ. Yeast, UA NOT REPORTED None       IMPRESSION: Bacterial Vaginosis, without complications. Patient has nonspecific generalized abdominal pain without constitutional symptoms. She did not have fever chills, chest pain or dyspnea. Patient was nontoxic-appearing, well developed, patient's lab work was unremarkable. Pelvic exam was minimally revealing. Patient tested positive for Gardnerella vaginalis, she will be treated with Flagyl 500 mg twice daily outpatient. RADIOLOGY:  CT ABDOMEN PELVIS WO CONTRAST Additional Contrast? None    Result Date: 7/12/2021  Negative noncontrast CT examination of the abdomen and pelvis with no evidence of nephrolithiasis, obstructive uropathy or other acute process including normal appendix. EMERGENCY DEPARTMENT COURSE:  ED Course as of Jul 12 0814   Mon Jul 12, 2021   0506 Exam was unremarkable, no adnexal tenderness, no cervical motion tenderness. Nneka Yanes   1437 Patient is positive for bacterial vaginosis, will plan to treat outpatient with Flagyl 500 mg twice daily. [KK]      ED Course User Index  [KK] [place] Mccall DO       FINAL IMPRESSION      1. Generalized abdominal pain          DISPOSITION / PLAN     DISPOSITION Decision To Discharge 07/12/2021 08:03:52 AM      PATIENT REFERRED TO:  Stephanie Buckner MD  [address] [place] [address]  [phone]    In 3 days  As needed, If symptoms worsen      DISCHARGE MEDICATIONS:  New Prescriptions    METRONIDAZOLE (FLAGYL) 500 MG TABLET    Take 1 tablet by mouth 2 times daily Take with Food. Do NOT drink alcohol.        Abisai Ross DO  Emergency Medicine Resident    (Please note that portions of thisnote were completed with a voice recognition program.  Efforts were made to edit the dictations but occasionally words are mis-transcribed.)       [place] Mccall DO  Resident  07/12/21 1609

## 2021-07-15 NOTE — ED PROVIDER NOTES
[address] [facility]   Emergency Department  Faculty Attestation       I performed a history and physical examination of the patient and discussed management with the resident. I reviewed the residents note and agree with the documented findings including all diagnostic interpretations and plan of care. Any areas of disagreement are noted on the chart. I was personally present for the key portions of any procedures. I have documented in the chart those procedures where I was not present during the key portions. I have reviewed the emergency nurses triage note. I agree with the chief complaint, past medical history, past surgical history, allergies, medications, social and family history as documented unless otherwise noted below. Documentation of the HPI, Physical Exam and Medical Decision Making performed by scribsandrita is based on my personal performance of the HPI, PE and MDM. For Physician Assistant/ Nurse Practitioner cases/documentation I have personally evaluated this patient and have completed at least one if not all key elements of the E/M (history, physical exam, and MDM). Additional findings are as noted. Pertinent Comments     Primary Care Physician: Jayesh Lynch MD      ED Triage Vitals [07/12/21 0346]   BP Temp Temp Source Pulse Resp SpO2 Height Weight   127/80 98 °F (36.7 °C) Oral 71 22 100 % 5' 6\" (1.676 m) 180 lb (81.6 kg)        History/Physical: This is a 36 y.o. female who presents to the Emergency Department with complaint of abdominal pain that started earlier. States that the pain is lower abdomen in nature. She reported that pain radiates to the flank. But no dysruia and no vaginal complaints. On exam well appearing but does having some mild lower abdominal tenderness with no rebound. No CVA tenderness.    exam per residnet     MDM/Plan:   Abdominal pain, basic labs   Concern for possible kidney stone - CT scan  Possible d/c       Critical Care: None     Ana Zendejas MD  Attending Emergency Physician         Keli Friedman MD  07/14/21 1889

## 2021-07-19 ENCOUNTER — TELEPHONE (OUTPATIENT)
Dept: FAMILY MEDICINE CLINIC | Age: 41
End: 2021-07-19

## 2021-07-19 DIAGNOSIS — S33.5XXD SPRAIN OF LOW BACK, SUBSEQUENT ENCOUNTER: ICD-10-CM

## 2021-07-19 RX ORDER — LIDOCAINE 50 MG/G
OINTMENT TOPICAL
Qty: 240 G | Refills: 1 | Status: SHIPPED | OUTPATIENT
Start: 2021-07-19 | End: 2021-11-18

## 2021-07-19 NOTE — TELEPHONE ENCOUNTER
Lvm informing to provide another number or have form faxed to office.  The number provided is not in service

## 2021-07-19 NOTE — TELEPHONE ENCOUNTER
Patient called back and stated that they want to know if you can call this number if you havent received the paperwork for her eletric so it wont her shot off.        [phone]  Please advise

## 2021-07-19 NOTE — TELEPHONE ENCOUNTER
Patient called in stating that her electric is about to get shut off due to her being sick and almost having a stroke. She wants to know if you can give her a medical certificate if possible.     Please advise

## 2021-08-11 ENCOUNTER — TELEMEDICINE (OUTPATIENT)
Dept: FAMILY MEDICINE CLINIC | Age: 41
End: 2021-08-11
Payer: MEDICARE

## 2021-08-11 DIAGNOSIS — B35.1 FUNGAL INFECTION OF TOENAIL: Primary | ICD-10-CM

## 2021-08-11 PROCEDURE — 99442 PR PHYS/QHP TELEPHONE EVALUATION 11-20 MIN: CPT | Performed by: FAMILY MEDICINE

## 2021-08-11 NOTE — PROGRESS NOTES
General FM note    TeleHealth encounter -- Audio/Visual (During [facility] public health emergency)    Kendall Myrick is a 36 y.o. female who presents today for follow up on her  medical conditions as noted below. Kendall Myrick is c/o of   Chief Complaint   Patient presents with    Nail Problem     Fungus both big toes, Rt nail fell off       Patient Active Problem List:     Suicide attempt ([address]     Bipolar 1 disorder ([address]     Postnasal drip     Marijuana abuse     Obesity (BMI 30.0-34. 9)     Closed nondisplaced oblique fracture of shaft of left ulna     Closed nondisplaced oblique fracture of shaft of ulna with routine healing     Closed fracture of lower end of right radius with routine healing     Fibromyalgia     Depression with suicidal ideation     Polysubstance abuse ([address]     Bipolar II disorder (HCC)     Right sided abdominal pain     Constipation     Hammertoe of right foot     Pain and swelling of toe of right foot     Past Medical History:   Diagnosis Date    ADD (attention deficit disorder)     ADHD (attention deficit hyperactivity disorder)     Bipolar 1 disorder (HCC)     Bronchitis     Constipation     Depression     Diarrhea     Difficulty swallowing     Dizziness     Fibromyalgia     Fibromyalgia     Hammertoe of right foot     Headache     HLD (hyperlipidemia)     IBS (irritable bowel syndrome)     MRSA (methicillin resistant staph aureus) culture positive     Nausea & vomiting     Nervously anxious     SOB (shortness of breath)     Thyroid disorder     Wears glasses     Weight gain       Past Surgical History:   Procedure Laterality Date    DILATION AND CURETTAGE OF UTERUS      EXCISION / BIOPSY SKIN LESION OF LEG Left 2/24/2017    EXCISION MEDIAL THIGH LESION performed by Candi Ovalles IV, DO at [address] Right 2/3/2021    TOE HAMMER REPAIR 4th & 5th TOES performed by Mert Reagan DPM at [address] biopsys    PRE-MALIGNANT / BENIGN SKIN LESION EXCISION Left 02/24/2017    inner thigh     TONSILLECTOMY      adenoids     Family History   Problem Relation Age of Onset    Hypertension Mother     Diabetes Father     Brain Cancer Maternal Grandmother     Heart Defect Maternal Grandfather     Stroke Maternal Aunt      Current Outpatient Medications   Medication Sig Dispense Refill    ciclopirox (PENLAC) 8 % solution Apply topically nightly. 6 mL 0    lidocaine (XYLOCAINE) 5 % ointment apply topically if needed 240 g 1    metroNIDAZOLE (FLAGYL) 500 MG tablet Take 1 tablet by mouth 2 times daily Take with Food. Do NOT drink alcohol. 14 tablet 0    ARIPiprazole (ABILIFY) 5 MG tablet take 1 tablet by mouth nightly 30 tablet 3    atorvastatin (LIPITOR) 10 MG tablet take 1 tablet by mouth at bedtime      ibuprofen (ADVIL;MOTRIN) 600 MG tablet take 1 tablet by mouth every 6 hours      aspirin 81 MG EC tablet Take 81 mg by mouth daily      gabapentin (NEURONTIN) 300 MG capsule take 1 capsule by mouth three times a day 90 capsule 1     No current facility-administered medications for this visit. ALLERGIES:    Allergies   Allergen Reactions    Sulfamethoxazole-Trimethoprim      dont remember RX       Social History     Tobacco Use    Smoking status: Never Smoker    Smokeless tobacco: Never Used    Tobacco comment: Marijuana only never tobacco   Substance Use Topics    Alcohol use: Yes     Comment: Occasional      There is no height or weight on file to calculate BMI. There were no vitals taken for this visit. Subjective:      HPI    36 y.o. female reaching out per telephone visit today. Patient states that she is playing catch up. \"Nothing is good \". She had a second job and she is hoping that she is able to catch up with all the payments. Her car also broke down. Her grandson was born and she is quite happy about it. The patient is reaching out today because of toenail fungus.   She states that she did an infection almost at all the toes. She states that the topical treatment helped and now the left second toe is still infected she lost the left big toenail. She also says that her right foot the first and second toenail just infected. She would like to use a topical medication. She does not have time to podiatrist.    Review of Systems   Constitutional: Negative for fever and unexpected weight change. Objective:   Physical Exam  General appearance: normal development, habitus and attention, no deformities. No distress. Pulmo: normal breathing pattern. Psychiatric: alert and oriented to place, time and person. Normal mood and affect. Assessment:       Diagnosis Orders   1. Fungal infection of toenail  ciclopirox (PENLAC) 8 % solution       Plan:   Use the medication. Call with changes. Call or return to clinic prn if these symptoms worsen or fail to improve as anticipated. I have reviewed the instructions with the patient, answering all questions to her satisfaction. José Plascencia is a 36 y.o. female being evaluated by a Virtual Visit (video visit) encounter to address concerns as mentioned above. A caregiver was present when appropriate. Due to this being a TeleHealth encounter (During [facility]- public health emergency), evaluation of the following organ systems was limited: Vitals/Constitutional/EENT/Resp/CV/GI//MS/Neuro/Skin/Heme-Lymph-Imm. Pursuant to the emergency declaration under the [facility], [address] [place] [address] authority and the [employer] Act, this Virtual Visit was conducted with patient's (and/or legal guardian's) consent, to reduce the patient's risk of exposure to COVID-19 and provide necessary medical care.   The patient (and/or legal guardian) has also been advised to contact this office for worsening conditions or problems, and seek emergency medical treatment and/or call 911 if deemed necessary. Patient identification was verified at the start of the visit: Yes    Total time spent for this encounter: 15 minutes. Services were provided through a video synchronous discussion virtually to substitute for in-person clinic visit. Patient and provider were located at their individual homes. --Carson Olsen MD on 8/11/2021 at 3:54 PM    An electronic signature was used to authenticate this note. No follow-ups on file. No orders of the defined types were placed in this encounter. Orders Placed This Encounter   Medications    ciclopirox (PENLAC) 8 % solution     Sig: Apply topically nightly.      Dispense:  6 mL     Refill:  0       (Please note that portions of this note were completed with a voice recognition program. Efforts were made to edit the dictations but occasionally words are mis-transcribed.)

## 2021-08-21 NOTE — GROUP NOTE
Group Therapy Note    Date: 7/2/2020    Group Start Time: 1430  Group End Time: 1500  Group Topic: Psychoeducation    RENETTA BHED Conteh, RUBENS        Group Therapy Note    Attendees: 13         Patient's Goal:  To increase interpersonal skills     Notes:  Patient attended group and participated     Status After Intervention:  Improved    Participation Level:  Active Listener and Interactive    Participation Quality: Appropriate, Attentive and Sharing      Speech:  normal      Thought Process/Content: Logical      Affective Functioning: Congruent      Mood: euthymic      Level of consciousness:  Alert and Oriented x4      Response to Learning: Progressing to goal      Endings: None Reported    Modes of Intervention: Education and Socialization      Discipline Responsible: Psychoeducational Specialist      Signature:  Mindi Lefort no

## 2021-09-04 DIAGNOSIS — M25.531 PAIN, WRIST, RIGHT: ICD-10-CM

## 2021-09-04 DIAGNOSIS — R20.2 NUMBNESS AND TINGLING IN RIGHT HAND: ICD-10-CM

## 2021-09-04 DIAGNOSIS — R20.0 NUMBNESS AND TINGLING IN RIGHT HAND: ICD-10-CM

## 2021-09-07 RX ORDER — ARIPIPRAZOLE 5 MG/1
5 TABLET ORAL NIGHTLY
Qty: 30 TABLET | Refills: 3 | Status: SHIPPED | OUTPATIENT
Start: 2021-09-07 | End: 2022-08-30 | Stop reason: ALTCHOICE

## 2021-09-07 RX ORDER — GABAPENTIN 300 MG/1
CAPSULE ORAL
Qty: 90 CAPSULE | Refills: 1 | Status: SHIPPED | OUTPATIENT
Start: 2021-09-07 | End: 2021-12-16

## 2021-09-07 NOTE — TELEPHONE ENCOUNTER
Nghia Flores is calling to request a refill on the following medication(s):    Last Visit Date (If Applicable):  1/49/1058    Next Visit Date:    Visit date not found    Medication Request:  Requested Prescriptions     Pending Prescriptions Disp Refills    ARIPiprazole (ABILIFY) 5 MG tablet [Pharmacy Med Name: ARIPIPRAZOLE 5 MG TABLET] 30 tablet 3     Sig: take 1 tablet by mouth nightly    gabapentin (NEURONTIN) 300 MG capsule [Pharmacy Med Name: GABAPENTIN 300 MG CAPSULE] 90 capsule 1     Sig: take 1 capsule by mouth three times a day

## 2021-09-15 ENCOUNTER — TELEPHONE (OUTPATIENT)
Dept: FAMILY MEDICINE CLINIC | Age: 41
End: 2021-09-15

## 2021-09-15 RX ORDER — FLUCONAZOLE 150 MG/1
150 TABLET ORAL
Qty: 2 TABLET | Refills: 0 | Status: SHIPPED | OUTPATIENT
Start: 2021-09-15 | End: 2021-09-21

## 2021-09-15 NOTE — TELEPHONE ENCOUNTER
Pt called in stating she is on a antibiotic and she thinks it is causing her to have a years infection. She wants to know if you could call her in some  Medication she stated she know it is a yeast infection.     Please advise

## 2021-09-28 ENCOUNTER — TELEPHONE (OUTPATIENT)
Dept: FAMILY MEDICINE CLINIC | Age: 41
End: 2021-09-28

## 2021-11-18 ENCOUNTER — HOSPITAL ENCOUNTER (EMERGENCY)
Age: 41
Discharge: HOME | End: 2021-11-19
Attending: EMERGENCY MEDICINE
Payer: MEDICARE

## 2021-11-18 VITALS
HEART RATE: 76 BPM | WEIGHT: 184 LBS | OXYGEN SATURATION: 100 % | BODY MASS INDEX: 24.92 KG/M2 | HEIGHT: 72 IN | DIASTOLIC BLOOD PRESSURE: 75 MMHG | SYSTOLIC BLOOD PRESSURE: 110 MMHG | RESPIRATION RATE: 20 BRPM | TEMPERATURE: 96.8 F

## 2021-11-18 DIAGNOSIS — S39.012A STRAIN OF LUMBAR REGION, INITIAL ENCOUNTER: Primary | ICD-10-CM

## 2021-11-18 LAB
BILIRUBIN URINE: NEGATIVE
COLOR: YELLOW
COMMENT UA: NORMAL
GLUCOSE URINE: NEGATIVE
HCG(URINE) PREGNANCY TEST: NEGATIVE
KETONES, URINE: NEGATIVE
LEUKOCYTE ESTERASE, URINE: NEGATIVE
NITRITE, URINE: NEGATIVE
PH UA: 6 (ref 5–8)
PROTEIN UA: NEGATIVE
SPECIFIC GRAVITY UA: 1.02 (ref 1–1.03)
TURBIDITY: CLEAR
URINE HGB: NEGATIVE
UROBILINOGEN, URINE: NORMAL

## 2021-11-18 PROCEDURE — 81025 URINE PREGNANCY TEST: CPT

## 2021-11-18 PROCEDURE — 81003 URINALYSIS AUTO W/O SCOPE: CPT

## 2021-11-18 PROCEDURE — 99282 EMERGENCY DEPT VISIT SF MDM: CPT

## 2021-11-18 PROCEDURE — 6370000000 HC RX 637 (ALT 250 FOR IP): Performed by: STUDENT IN AN ORGANIZED HEALTH CARE EDUCATION/TRAINING PROGRAM

## 2021-11-18 RX ORDER — ACETAMINOPHEN 500 MG
1000 TABLET ORAL ONCE
Status: COMPLETED | OUTPATIENT
Start: 2021-11-18 | End: 2021-11-18

## 2021-11-18 RX ORDER — NAPROXEN 500 MG/1
500 TABLET ORAL 2 TIMES DAILY WITH MEALS
Qty: 20 TABLET | Refills: 0 | Status: SHIPPED | OUTPATIENT
Start: 2021-11-18 | End: 2021-12-03

## 2021-11-18 RX ORDER — LIDOCAINE 4 G/G
1 PATCH TOPICAL DAILY
Status: DISCONTINUED | OUTPATIENT
Start: 2021-11-18 | End: 2021-11-19 | Stop reason: HOSPADM

## 2021-11-18 RX ORDER — LIDOCAINE 50 MG/G
1 PATCH TOPICAL DAILY
Qty: 30 PATCH | Refills: 0 | Status: SHIPPED | OUTPATIENT
Start: 2021-11-18 | End: 2022-08-30

## 2021-11-18 RX ADMIN — ACETAMINOPHEN 1000 MG: 500 TABLET ORAL at 22:43

## 2021-11-18 ASSESSMENT — ENCOUNTER SYMPTOMS
BACK PAIN: 1
ABDOMINAL PAIN: 0
SHORTNESS OF BREATH: 0
DIARRHEA: 0
NAUSEA: 0
VOMITING: 0
PHOTOPHOBIA: 0

## 2021-11-18 ASSESSMENT — PAIN SCALES - GENERAL: PAINLEVEL_OUTOF10: 9

## 2021-11-18 NOTE — Clinical Note
Ronald Crooks was seen and treated in our emergency department on 11/18/2021. She may return to work on 11/20/2021. If you have any questions or concerns, please don't hesitate to call.       Anny Vazquez, DO

## 2021-11-19 NOTE — ED PROVIDER NOTES
[facility]  ED  Emergency Department Encounter  EmergencyMedicine Resident     Pt Shorty Alejandre  MRN: [id number]  [place] 1980  Date of evaluation: 11/18/21  PCP:  Zuleika Yu MD    This patient was evaluated in the Emergency Department for symptoms described in the history of present illness. The patient was evaluated in the context of the global COVID-19 pandemic, which necessitated consideration that the patient might be at risk for infection with the SARS-CoV-2 virus that causes COVID-19. Institutional protocols and algorithms that pertain to the evaluation of patients at risk for COVID-19 are in a state of rapid change based on information released by regulatory bodies including the CDC and federal and state organizations. These policies and algorithms were followed during the patient's care in the ED. CHIEF COMPLAINT       Chief Complaint   Patient presents with    Flank Pain    Back Pain       HISTORY OF PRESENT ILLNESS  (Location/Symptom, Timing/Onset, Context/Setting, Quality, Duration, Modifying Factors, Severity.)      Marta Daley is a 36 y.o. female who presents with plaint of low back pain which began after sleeping differently approximately 24 hours ago. Patient denies any falls or any trauma any lifting or twisting abnormally. Patient works as a  and is on her feet regularly. Denies any trauma to her back in the past including any denies any surgeries or instrumentation denies any IV drug use any fevers any chills any abdominal pain and nausea vomiting any difficulty urinating blood in her urine or history of kidney stones no changes in weight recently. Has tried some warm baths prior to arrival which sooth her back and allowed her to get some sleep. Patient tried taking her gabapentin which she has for nerve damage secondary to a hand injury from many years ago this has not improved her symptoms.     PAST MEDICAL / SURGICAL / SOCIAL / FAMILY HISTORY has a past medical history of ADD (attention deficit disorder), ADHD (attention deficit hyperactivity disorder), Bipolar 1 disorder ([facility] [address] Bronchitis, Constipation, Depression, Diarrhea, Difficulty swallowing, Dizziness, Fibromyalgia, Fibromyalgia, Hammertoe of right foot, Headache, HLD (hyperlipidemia), IBS (irritable bowel syndrome), MRSA (methicillin resistant staph aureus) culture positive, Nausea & vomiting, Nervously anxious, SOB (shortness of breath), Thyroid disorder, Wears glasses, and Weight gain. has a past surgical history that includes other surgical history; Dilation and curettage of uterus; Tonsillectomy; pre-malignant / benign skin lesion excision (Left, 02/24/2017); EXCISION / BIOPSY SKIN LESION OF LEG (Left, 2/24/2017); and Hammer toe surgery (Right, 2/3/2021). Social History     Socioeconomic History    Marital status:      Spouse name: Not on file    Number of children: Not on file    Years of education: Not on file    Highest education level: Not on file   Occupational History    Not on file   Tobacco Use    Smoking status: Never Smoker    Smokeless tobacco: Never Used    Tobacco comment: Marijuana only never tobacco   Vaping Use    Vaping Use: Never used   Substance and Sexual Activity    Alcohol use: Yes     Comment: Occasional    Drug use: Yes     Comment: marijuana daily last time 2/2/21    Sexual activity: Not on file   Other Topics Concern    Not on file   Social History Narrative    Not on file     Social Determinants of Health     Financial Resource Strain: Low Risk     Difficulty of Paying Living Expenses: Not hard at all   Food Insecurity: No Food Insecurity    Worried About Running Out of Food in the Last Year: Never true    Keith of Food in the Last Year: Never true   Transportation Needs:     Lack of Transportation (Medical): Not on file    Lack of Transportation (Non-Medical):  Not on file   Physical Activity:     Days of Exercise per Week: Not on file    Minutes of Exercise per Session: Not on file   Stress:     Feeling of Stress : Not on file   Social Connections:     Frequency of Communication with Friends and Family: Not on file    Frequency of Social Gatherings with Friends and Family: Not on file    Attends Faith Services: Not on file    Active Member of [employer] or Organizations: Not on file    Attends Club or Organization Meetings: Not on file    Marital Status: Not on file   Intimate Partner Violence:     Fear of Current or Ex-Partner: Not on file    Emotionally Abused: Not on file    Physically Abused: Not on file    Sexually Abused: Not on file   Housing Stability:     Unable to Pay for Housing in the Last Year: Not on file    Number of [place] in the Last Year: Not on file    Unstable Housing in the Last Year: Not on file       Family History   Problem Relation Age of Onset    Hypertension Mother     Diabetes Father     Brain Cancer Maternal Grandmother     Heart Defect Maternal Grandfather     Stroke Maternal Aunt        Allergies:  Sulfamethoxazole-trimethoprim    Home Medications:  Prior to Admission medications    Medication Sig Start Date End Date Taking? Authorizing Provider   ARIPiprazole (ABILIFY) 5 MG tablet take 1 tablet by mouth nightly 9/7/21   Rachna Prasad MD   gabapentin (NEURONTIN) 300 MG capsule take 1 capsule by mouth three times a day 9/7/21 10/7/21  Rachna Prasad MD   ciclopirox (PENLAC) 8 % solution Apply topically nightly. 8/11/21   Rachna Prasad MD   lidocaine (XYLOCAINE) 5 % ointment apply topically if needed 7/19/21   Rachna Prasad MD   metroNIDAZOLE (FLAGYL) 500 MG tablet Take 1 tablet by mouth 2 times daily Take with Food. Do NOT drink alcohol.  7/12/21   Soila Milligan DO   atorvastatin (LIPITOR) 10 MG tablet take 1 tablet by mouth at bedtime 3/9/21   Historical Provider, MD   ibuprofen (ADVIL;MOTRIN) 600 MG tablet take 1 tablet by mouth every 6 hours 3/9/21 Historical Provider, MD   aspirin 81 MG EC tablet Take 81 mg by mouth daily    Historical Provider, MD       REVIEW OF SYSTEMS    (2-9 systems for level 4, 10 or more for level 5)      Review of Systems   Constitutional: Negative for fever. Eyes: Negative for photophobia. Respiratory: Negative for shortness of breath. Cardiovascular: Negative for chest pain. Gastrointestinal: Negative for abdominal pain, diarrhea, nausea and vomiting. Genitourinary: Negative for decreased urine volume, difficulty urinating, dysuria, flank pain, frequency, hematuria, urgency, vaginal bleeding, vaginal discharge and vaginal pain. Musculoskeletal: Positive for back pain. Negative for arthralgias, gait problem, neck pain and neck stiffness. Skin: Negative for pallor, rash and wound. Allergic/Immunologic: Negative for environmental allergies. Neurological: Negative for dizziness, tremors, syncope, weakness, light-headedness, numbness and headaches. Hematological: Negative for adenopathy. Psychiatric/Behavioral: Negative for agitation and confusion. PHYSICAL EXAM   (up to 7 for level 4, 8 or more for level 5)      INITIAL VITALS:   /75   Pulse 76   Temp 96.8 °F (36 °C) (Oral)   Resp 20   Ht 6' 6\" (1.981 m)   Wt 184 lb (83.5 kg)   LMP 11/08/2021   SpO2 100%   BMI 21.26 kg/m²     Physical Exam  Vitals reviewed. Constitutional:       General: She is not in acute distress. Appearance: Normal appearance. She is not toxic-appearing. HENT:      Head: Normocephalic and atraumatic. Right Ear: External ear normal.      Left Ear: External ear normal.      Nose: Nose normal.      Mouth/Throat:      Pharynx: Oropharynx is clear. Eyes:      General: No scleral icterus. Conjunctiva/sclera: Conjunctivae normal.   Cardiovascular:      Rate and Rhythm: Normal rate. Pulses: Normal pulses. Pulmonary:      Effort: Pulmonary effort is normal.   Abdominal:      Palpations: Abdomen is soft. Tenderness: There is no abdominal tenderness. There is no left CVA tenderness, guarding or rebound. Musculoskeletal:         General: No swelling or deformity. Normal range of motion. Cervical back: Normal range of motion. Right lower leg: No edema. Left lower leg: No edema. Skin:     General: Skin is warm. Capillary Refill: Capillary refill takes less than 2 seconds. Neurological:      Mental Status: She is alert. GCS: GCS eye subscore is 4. GCS verbal subscore is 5. GCS motor subscore is 6. Cranial Nerves: Cranial nerves are intact. Sensory: Sensation is intact. Motor: Motor function is intact. No weakness, tremor, atrophy or abnormal muscle tone. Coordination: Coordination is intact. Gait: Gait is intact. Gait normal.      Deep Tendon Reflexes: Babinski sign absent on the right side. Babinski sign absent on the left side. Reflex Scores:       Patellar reflexes are 2+ on the left side. Achilles reflexes are 2+ on the right side and 2+ on the left side. Psychiatric:         Mood and Affect: Mood normal.         DIFFERENTIAL  DIAGNOSIS     PLAN (LABS / [place]  / EKG):  Orders Placed This Encounter   Procedures    Urinalysis Reflex to Culture    Pregnancy, Urine       MEDICATIONS ORDERED:  Orders Placed This Encounter   Medications    lidocaine 4 % external patch 1 patch    acetaminophen (TYLENOL) tablet 1,000 mg    naproxen (NAPROSYN) 500 MG tablet     Sig: Take 1 tablet by mouth 2 times daily (with meals)     Dispense:  20 tablet     Refill:  0    lidocaine (LIDODERM) 5 %     Sig: Place 1 patch onto the skin daily 12 hours on, 12 hours off.      Dispense:  30 patch     Refill:  0       DDX: muscle strain, sprain, uti    DIAGNOSTIC RESULTS / [address] [facility]   LAB RESULTS:  Results for orders placed or performed during the hospital encounter of 11/18/21   Urinalysis Reflex to Culture    Specimen: Urine, clean catch   Result Value Ref Range    Color, UA Yellow Yellow    Turbidity UA Clear Clear    Glucose, Ur NEGATIVE NEGATIVE    Bilirubin Urine NEGATIVE NEGATIVE    Ketones, Urine NEGATIVE NEGATIVE    Specific Gravity, UA 1.021 1.005 - 1.030    Urine Hgb NEGATIVE NEGATIVE    pH, UA 6.0 5.0 - 8.0    Protein, UA NEGATIVE NEGATIVE    Urobilinogen, Urine Normal Normal    Nitrite, Urine NEGATIVE NEGATIVE    Leukocyte Esterase, Urine NEGATIVE NEGATIVE    Urinalysis Comments       Microscopic exam not performed based on chemical results unless requested in original order. Pregnancy, Urine   Result Value Ref Range    HCG(Urine) Pregnancy Test NEGATIVE NEGATIVE       IMPRESSION: Patient is alert oriented nontoxic 42-year-old female no acute distress without any focal neuro deficits she is ambulating without difficulty or assistance planing of a 24-hour history of low back pain generalized relieved by warm soaks station is intact she has no back pain red flags plan will be urinalysis, supportive care with acetaminophen, Lidoderm patch will obtain urine pregnancy test    RADIOLOGY:      EKG      All EKG's are interpreted by the Emergency Department Physician who either signs or Co-signs this chart in the absence of a cardiologist.    EMERGENCY DEPARTMENT COURSE:  Evaluate history physical exam signs and symptoms are consistent with muscle strain urinalysis is unremarkable urine pregnancy test was negative provided Lidoderm patch and acetaminophen with significant improvement in her pain symptoms patient discharged home in stable condition with outpatient follow-up. PROCEDURES:      CONSULTS:  None    CRITICAL CARE:      FINAL IMPRESSION      1.  Strain of lumbar region, initial encounter          DISPOSITION / PLAN     DISPOSITION  dc      PATIENT REFERRED TO:  Jhonny Miller MD  [address] [place] [address]  [phone]    Call today  for followup and reevaluation in 1-2 days    [facility]  [address] [facility]  [phone]  Go to   If symptoms worsen, As needed      DISCHARGE MEDICATIONS:  Discharge Medication List as of 11/18/2021 11:42 PM      START taking these medications    Details   naproxen (NAPROSYN) 500 MG tablet Take 1 tablet by mouth 2 times daily (with meals), Disp-20 tablet, R-0Print      lidocaine (LIDODERM) 5 % Place 1 patch onto the skin daily 12 hours on, 12 hours off., Disp-30 patch, R-0Print             Cheli Oliver DO  Emergency Medicine Resident    (Please note that portions of thisnote were completed with a voice recognition program.  Efforts were made to edit the dictations but occasionally words are mis-transcribed.)        Cheli Oliver DO  Resident  11/19/21 [id number]

## 2021-11-19 NOTE — ED PROVIDER NOTES
[address] [facility]     Emergency Department     Faculty Attestation    I performed a history and physical examination of the patient and discussed management with the resident. I reviewed the residents note and agree with the documented findings and plan of care. Any areas of disagreement are noted on the chart. I was personally present for the key portions of any procedures. I have documented in the chart those procedures where I was not present during the key portions. I have reviewed the emergency nurses triage note. I agree with the chief complaint, past medical history, past surgical history, allergies, medications, social and family history as documented unless otherwise noted below. For Physician Assistant/ Nurse Practitioner cases/documentation I have personally evaluated this patient and have completed at least one if not all key elements of the E/M (history, physical exam, and MDM). Additional findings are as noted. I have personally seen and evaluated the patient. I find the patient's history and physical exam are consistent with the NP/PA documentation. I agree with the care provided, treatment rendered, disposition and follow-up plan. Severe lower back discomfort tenderness noted at the SI joint bilaterally no neurologic complaints. Critical Care     Anuel Krishna M.D.   Attending Emergency  Physician              Daysi Mendes MD  11/18/21 [id number]

## 2021-11-19 NOTE — ED NOTES
Discharges instructions given by physician. Verbalized understanding. All questions answered.        Namrata Simms LPN  05/71/32 [id number]

## 2021-12-03 ENCOUNTER — HOSPITAL ENCOUNTER (OUTPATIENT)
Age: 41
Setting detail: SPECIMEN
Discharge: HOME | End: 2021-12-03

## 2021-12-03 ENCOUNTER — OFFICE VISIT (OUTPATIENT)
Dept: OBGYN CLINIC | Age: 41
End: 2021-12-03
Payer: MEDICARE

## 2021-12-03 VITALS
DIASTOLIC BLOOD PRESSURE: 84 MMHG | SYSTOLIC BLOOD PRESSURE: 128 MMHG | WEIGHT: 181 LBS | HEIGHT: 66 IN | BODY MASS INDEX: 29.09 KG/M2

## 2021-12-03 DIAGNOSIS — Z11.3 SCREEN FOR STD (SEXUALLY TRANSMITTED DISEASE): ICD-10-CM

## 2021-12-03 DIAGNOSIS — N89.8 VAGINAL DISCHARGE: Primary | ICD-10-CM

## 2021-12-03 PROBLEM — R00.2 PALPITATIONS: Status: ACTIVE | Noted: 2021-12-03

## 2021-12-03 PROBLEM — T14.8XXA FRACTURE OF BONE: Status: ACTIVE | Noted: 2021-12-03

## 2021-12-03 PROBLEM — D64.9 ANEMIA: Status: ACTIVE | Noted: 2021-12-03

## 2021-12-03 PROBLEM — E07.9 DISORDER OF THYROID: Status: ACTIVE | Noted: 2021-12-03

## 2021-12-03 PROCEDURE — G8484 FLU IMMUNIZE NO ADMIN: HCPCS | Performed by: NURSE PRACTITIONER

## 2021-12-03 PROCEDURE — G8417 CALC BMI ABV UP PARAM F/U: HCPCS | Performed by: NURSE PRACTITIONER

## 2021-12-03 PROCEDURE — 1036F TOBACCO NON-USER: CPT | Performed by: NURSE PRACTITIONER

## 2021-12-03 PROCEDURE — G8427 DOCREV CUR MEDS BY ELIG CLIN: HCPCS | Performed by: NURSE PRACTITIONER

## 2021-12-03 PROCEDURE — 99203 OFFICE O/P NEW LOW 30 MIN: CPT | Performed by: NURSE PRACTITIONER

## 2021-12-03 NOTE — PROGRESS NOTES
Shweta Richmond  12/3/2021    YOB: 1980          The patient was seen today. She is here regarding vaginal discharge/ STD screening. States she stepped out on her partner and has been having vaginal discomfort since . Her bowels are regular and she is voiding without difficulty.      HPI:  Shweta Richmond is a 36 y.o. female [id number] Vaginal discharge/ STD screening      OB History    Para Term  AB Living   4 2 2 0 2 2   SAB IAB Ectopic Molar Multiple Live Births   1 0 0 0 0 2      # Outcome Date GA Lbr Chauncey/2nd Weight Sex Delivery Anes PTL Lv   4 AB 2005           3 SAB 2004           2 Term  40w0d  7 lb 9 oz (3.43 kg) F Vag-Spont   CASSIDY   1 Term  40w0d  6 lb 14 oz (3.118 kg) F Vag-Spont   CASSIDY       Past Medical History:   Diagnosis Date    ADD (attention deficit disorder)     ADHD (attention deficit hyperactivity disorder)     Bipolar 1 disorder (HCC)     Bronchitis     Constipation     Depression     Diarrhea     Difficulty swallowing     Dizziness     Fibromyalgia     Fibromyalgia     Hammertoe of right foot     Headache     HLD (hyperlipidemia)     IBS (irritable bowel syndrome)     MRSA (methicillin resistant staph aureus) culture positive     Nausea & vomiting     Nervously anxious     SOB (shortness of breath)     Thyroid disorder     Wears glasses     Weight gain        Past Surgical History:   Procedure Laterality Date    DILATION AND CURETTAGE OF UTERUS      EXCISION / BIOPSY SKIN LESION OF LEG Left 2017    EXCISION MEDIAL THIGH LESION performed by Karlo Ovalles IV, DO at [address] Right 2/3/2021    TOE HAMMER REPAIR 4th & 5th TOES performed by John Farias DPM at [address] [place] [address]      2 cone biopsys    PRE-MALIGNANT / BENIGN SKIN LESION EXCISION Left 2017    inner thigh     TONSILLECTOMY      adenoids       Family History   Problem Relation Age of Onset    Hypertension Mother    Theodora Diabetes Father     Brain Cancer Maternal Grandmother     Heart Defect Maternal Grandfather     Stroke Maternal Aunt        Social History     Socioeconomic History    Marital status:      Spouse name: Not on file    Number of children: Not on file    Years of education: Not on file    Highest education level: Not on file   Occupational History    Not on file   Tobacco Use    Smoking status: Never Smoker    Smokeless tobacco: Never Used    Tobacco comment: Marijuana only never tobacco   Vaping Use    Vaping Use: Never used   Substance and Sexual Activity    Alcohol use: Yes     Comment: Occasional    Drug use: Yes     Comment: marijuana daily last time 2/2/21    Sexual activity: Yes     Partners: Male   Other Topics Concern    Not on file   Social History Narrative    Not on file     Social Determinants of Health     Financial Resource Strain: Low Risk     Difficulty of Paying Living Expenses: Not hard at all   Food Insecurity: No Food Insecurity    Worried About Running Out of Food in the Last Year: Never true    Keith of Food in the Last Year: Never true   Transportation Needs:     Lack of Transportation (Medical): Not on file    Lack of Transportation (Non-Medical):  Not on file   Physical Activity:     Days of Exercise per Week: Not on file    Minutes of Exercise per Session: Not on file   Stress:     Feeling of Stress : Not on file   Social Connections:     Frequency of Communication with Friends and Family: Not on file    Frequency of Social Gatherings with Friends and Family: Not on file    Attends Sabianism Services: Not on file    Active Member of Clubs or Organizations: Not on file    Attends Club or Organization Meetings: Not on file    Marital Status: Not on file   Intimate Partner Violence:     Fear of Current or Ex-Partner: Not on file    Emotionally Abused: Not on file    Physically Abused: Not on file    Sexually Abused: Not on file   Housing Stability:  Unable to Pay for Housing in the Last Year: Not on file    Number of Places Lived in the Last Year: Not on file    Unstable Housing in the Last Year: Not on file         MEDICATIONS:  Current Outpatient Medications   Medication Sig Dispense Refill    lidocaine (LIDODERM) 5 % Place 1 patch onto the skin daily 12 hours on, 12 hours off. 30 patch 0    ARIPiprazole (ABILIFY) 5 MG tablet take 1 tablet by mouth nightly (Patient not taking: Reported on 12/3/2021) 30 tablet 3    gabapentin (NEURONTIN) 300 MG capsule take 1 capsule by mouth three times a day 90 capsule 1    ciclopirox (PENLAC) 8 % solution Apply topically nightly. (Patient not taking: Reported on 12/3/2021) 6 mL 0    atorvastatin (LIPITOR) 10 MG tablet take 1 tablet by mouth at bedtime (Patient not taking: Reported on 12/3/2021)      aspirin 81 MG EC tablet Take 81 mg by mouth daily (Patient not taking: Reported on 12/3/2021)       No current facility-administered medications for this visit. ALLERGIES:  Allergies as of 12/03/2021 - Fully Reviewed 12/03/2021   Allergen Reaction Noted    Neomycin-bacitracin zn-polymyx  12/03/2021    Sulfamethoxazole-trimethoprim  12/29/2016         REVIEW OF SYSTEMS:    yes   A minimum of an eleven point review of systems was completed. Review Of Systems (11 point):  Constitutional: No fever, chills or malaise;  No weight change or fatigue  Head and Eyes: No vision, Headache, Dizziness or trauma in last 12 months  ENT ROS: No hearing, Tinnitis, sinus or taste problems  Hematological and Lymphatic ROS:No Lymphoma, Von Willebrand's, Hemophillia or Bleeding History  Psych ROS: No Depression, Homicidal thoughts,suicidal thoughts, or anxiety  Breast ROS: No prior breast abnormalities or lumps  Respiratory ROS: No SOB, Pneumoniae,Cough, or Pulmonary Embolism History  Cardiovascular ROS: No Chest Pain with Exertion, Palpitations, Syncope, Edema, Arrhythmia  Gastrointestinal ROS: No Indigestion, Heartburn, Nausea, vomiting, Diarrhea, Constipation,or Bowel Changes; No Bloody Stools or melena  Genito-Urinary ROS: No Dysuria, Hematuria or Nocturia. No Urinary Incontinence or Vaginal Discharge  Musculoskeletal ROS: No Arthralgia, Arthritis,Gout,Osteoporosis or Rheumatism  Neurological ROS: No CVA, Migraines, Epilepsy, Seizure Hx, or Limb Weakness  Dermatological ROS: No Rash, Itching, Hives, Mole Changes or Cancer          Blood pressure 128/84, height 5' 6\" (1.676 m), weight 181 lb (82.1 kg), last menstrual period 11/08/2021, not currently breastfeeding. Chaperone for Intimate Exam   Chaperone was offered and accepted as part of the rooming process.  Chaperone: Samson Gomez MA         Abdomen: Soft non-tender; good bowel sounds. No guarding, rebound or rigidity. No CVA tenderness bilaterally. Extremities: No calf tenderness, DTR 2/4, and No edema bilaterally    Pelvic: Vulva and vagina appear normal. Bimanual exam reveals normal uterus and adnexa. Vaginal mucosa red. Minimal discharge noted    Diagnostics:  No results found. Lab Results:  Results for orders placed or performed during the hospital encounter of 11/18/21   Urinalysis Reflex to Culture    Specimen: Urine, clean catch   Result Value Ref Range    Color, UA Yellow Yellow    Turbidity UA Clear Clear    Glucose, Ur NEGATIVE NEGATIVE    Bilirubin Urine NEGATIVE NEGATIVE    Ketones, Urine NEGATIVE NEGATIVE    Specific Gravity, UA 1.021 1.005 - 1.030    Urine Hgb NEGATIVE NEGATIVE    pH, UA 6.0 5.0 - 8.0    Protein, UA NEGATIVE NEGATIVE    Urobilinogen, Urine Normal Normal    Nitrite, Urine NEGATIVE NEGATIVE    Leukocyte Esterase, Urine NEGATIVE NEGATIVE    Urinalysis Comments       Microscopic exam not performed based on chemical results unless requested in original order. Pregnancy, Urine   Result Value Ref Range    HCG(Urine) Pregnancy Test NEGATIVE NEGATIVE         Assessment:   Diagnosis Orders   1.  Vaginal discharge  VAGINITIS DNA PROBE    C.trachomatis N.gonorrhoeae DNA    Hepatitis B Surface Antigen    Hepatitis C Antibody    HIV Screen    T. pallidum Ab   2. Screen for STD (sexually transmitted disease)       Patient Active Problem List    Diagnosis Date Noted    Anemia 12/03/2021    Disorder of thyroid 12/03/2021    Fracture of bone 12/03/2021     wrist and ulna      Palpitations 12/03/2021    Hammertoe of right foot     Pain and swelling of toe of right foot     Right sided abdominal pain 07/02/2020    Constipation 07/02/2020    Depression with suicidal ideation 07/01/2020    Polysubstance abuse ([facility] [address] 07/01/2020    Bipolar II disorder ([facility] 07/01/2020    Fibromyalgia 07/27/2019    Closed fracture of lower end of right radius with routine healing 07/02/2018    Closed nondisplaced oblique fracture of shaft of ulna with routine healing 04/03/2017    Closed nondisplaced oblique fracture of shaft of left ulna 03/22/2017    Postnasal drip 09/17/2015    Marijuana abuse 09/17/2015    Obesity (BMI 30.0-34.9) 09/17/2015    Suicide attempt ([facility] 10/18/2013    Bipolar 1 disorder ([facility]        The patient was informed of the recommended preventative health recommendations. 1. Annuals every year; Cytology collections per prevailing guidelines. 2. Mammograms begin every year at 37 yo if no abnormalities are found and no family history. 3. Bone density studies every 2-3 years. Begin at 71 yo. If no fracture history or osteoporosis family history. (significant). 4. Colonoscopy begin at 40 yo. Repeat every ten years if negative and no family history. 5. Calcium of 8441-4515 mg/day in split dosing  6. Vitamin D 400-800 IU/day  7. All other preventative health recommendations will be managed by the patients Primary care physician. PLAN:  Return in about 3 weeks (around 12/24/2021) for annual.  Vaginal cultures collected  Lab slip given  Repeat Annual every 1 year  Cervical Cytology Evaluation begins at 24years old.   If Negative Cytology, Follow-up screening per current guidelines. Return to the office in 3 weeks. Counseled on preventative health maintenance follow-up. Orders Placed This Encounter   Procedures    VAGINITIS DNA PROBE     Standing Status:   Future     Standing Expiration Date:   12/3/2022    C.trachomatis N.gonorrhoeae DNA     Standing Status:   Future     Standing Expiration Date:   12/3/2022    Hepatitis B Surface Antigen     Standing Status:   Future     Standing Expiration Date:   12/3/2022    Hepatitis C Antibody     Standing Status:   Future     Standing Expiration Date:   12/3/2022    HIV Screen     Standing Status:   Future     Standing Expiration Date:   12/3/2022    T. pallidum Ab     Standing Status:   Future     Standing Expiration Date:   1/2/2022           The patient, Dave Griggs is a 36 y.o. female, was seen with a total time spent of 25 minutes for the visit on this date of service by the E/M provider. The time component had both face to face and non face to face time spent in determining the total time component. Counseling and education regarding her diagnosis listed below and her options regarding those diagnoses were also included in determining her time component. Diagnosis Orders   1. Vaginal discharge  VAGINITIS DNA PROBE    C.trachomatis N.gonorrhoeae DNA    Hepatitis B Surface Antigen    Hepatitis C Antibody    HIV Screen    T. pallidum Ab   2. Screen for STD (sexually transmitted disease)          The patient had her preventative health maintenance recommendations and follow-up reviewed with her at the completion of her visit.

## 2021-12-04 DIAGNOSIS — N89.8 VAGINAL DISCHARGE: ICD-10-CM

## 2021-12-04 LAB
DIRECT EXAM: ABNORMAL
Lab: ABNORMAL
SPECIMEN DESCRIPTION: ABNORMAL

## 2021-12-06 ENCOUNTER — TELEPHONE (OUTPATIENT)
Dept: OBGYN CLINIC | Age: 41
End: 2021-12-06

## 2021-12-06 LAB
C TRACH DNA GENITAL QL NAA+PROBE: NEGATIVE
N. GONORRHOEAE DNA: NEGATIVE
SPECIMEN DESCRIPTION: NORMAL

## 2021-12-06 NOTE — TELEPHONE ENCOUNTER
----- Message from Monika Hurst, APRN - CNP sent at 12/6/2021  9:22 AM EST -----  +BV- flagyl 500mg pO BID x7 days

## 2021-12-08 ENCOUNTER — TELEPHONE (OUTPATIENT)
Dept: OBGYN CLINIC | Age: 41
End: 2021-12-08

## 2021-12-08 NOTE — TELEPHONE ENCOUNTER
----- Message from MATT Shelton CNP sent at 12/6/2021  9:22 AM EST -----  +BV- flagyl 500mg pO BID x7 days

## 2021-12-16 ENCOUNTER — TELEPHONE (OUTPATIENT)
Dept: OBGYN CLINIC | Age: 41
End: 2021-12-16

## 2021-12-16 DIAGNOSIS — R20.0 NUMBNESS AND TINGLING IN RIGHT HAND: ICD-10-CM

## 2021-12-16 DIAGNOSIS — R20.2 NUMBNESS AND TINGLING IN RIGHT HAND: ICD-10-CM

## 2021-12-16 DIAGNOSIS — M25.531 PAIN, WRIST, RIGHT: ICD-10-CM

## 2021-12-16 RX ORDER — GABAPENTIN 300 MG/1
CAPSULE ORAL
Qty: 90 CAPSULE | Refills: 1 | Status: SHIPPED | OUTPATIENT
Start: 2021-12-16 | End: 2022-03-08

## 2021-12-16 RX ORDER — METRONIDAZOLE 500 MG/1
500 TABLET ORAL 2 TIMES DAILY
Qty: 14 TABLET | Refills: 0 | Status: SHIPPED | OUTPATIENT
Start: 2021-12-16 | End: 2021-12-23

## 2021-12-16 NOTE — TELEPHONE ENCOUNTER
Per José Milligan NP, pt notified of +BV; script for flagyl to be sent to pt pharmacy. Pt verbalized understanding.

## 2021-12-16 NOTE — TELEPHONE ENCOUNTER
Dave Griggs is calling to request a refill on the following medication(s):    Last Visit Date (If Applicable):  5/75/1641    Next Visit Date:    12/20/2021    Medication Request:  Requested Prescriptions     Pending Prescriptions Disp Refills    gabapentin (NEURONTIN) 300 MG capsule [Pharmacy Med Name: GABAPENTIN 300 MG CAPSULE] 90 capsule 1     Sig: take 1 capsule by mouth three times a day

## 2021-12-16 NOTE — TELEPHONE ENCOUNTER
----- Message from MATT Villavicencio CNP sent at 12/6/2021  9:22 AM EST -----  +BV- flagyl 500mg pO BID x7 days

## 2022-03-08 DIAGNOSIS — R20.2 NUMBNESS AND TINGLING IN RIGHT HAND: ICD-10-CM

## 2022-03-08 DIAGNOSIS — M25.531 PAIN, WRIST, RIGHT: ICD-10-CM

## 2022-03-08 DIAGNOSIS — R20.0 NUMBNESS AND TINGLING IN RIGHT HAND: ICD-10-CM

## 2022-03-08 RX ORDER — GABAPENTIN 300 MG/1
CAPSULE ORAL
Qty: 90 CAPSULE | Refills: 1 | Status: SHIPPED | OUTPATIENT
Start: 2022-03-08 | End: 2022-05-27 | Stop reason: SDUPTHER

## 2022-04-09 ENCOUNTER — APPOINTMENT (OUTPATIENT)
Dept: CT IMAGING | Age: 42
End: 2022-04-09
Payer: MEDICARE

## 2022-04-09 ENCOUNTER — HOSPITAL ENCOUNTER (EMERGENCY)
Age: 42
Discharge: HOME | End: 2022-04-09
Attending: EMERGENCY MEDICINE
Payer: MEDICARE

## 2022-04-09 VITALS
TEMPERATURE: 97.2 F | HEART RATE: 84 BPM | DIASTOLIC BLOOD PRESSURE: 78 MMHG | OXYGEN SATURATION: 98 % | RESPIRATION RATE: 18 BRPM | SYSTOLIC BLOOD PRESSURE: 117 MMHG

## 2022-04-09 DIAGNOSIS — S01.01XA LACERATION OF SCALP, INITIAL ENCOUNTER: Primary | ICD-10-CM

## 2022-04-09 PROCEDURE — 6370000000 HC RX 637 (ALT 250 FOR IP): Performed by: STUDENT IN AN ORGANIZED HEALTH CARE EDUCATION/TRAINING PROGRAM

## 2022-04-09 PROCEDURE — 99283 EMERGENCY DEPT VISIT LOW MDM: CPT

## 2022-04-09 PROCEDURE — 70450 CT HEAD/BRAIN W/O DYE: CPT

## 2022-04-09 PROCEDURE — 12002 RPR S/N/AX/GEN/TRNK2.6-7.5CM: CPT

## 2022-04-09 RX ORDER — IBUPROFEN 800 MG/1
800 TABLET ORAL EVERY 8 HOURS PRN
Qty: 21 TABLET | Refills: 0 | Status: SHIPPED | OUTPATIENT
Start: 2022-04-09 | End: 2022-08-30 | Stop reason: DRUGHIGH

## 2022-04-09 RX ORDER — ACETAMINOPHEN 500 MG
1000 TABLET ORAL ONCE
Status: COMPLETED | OUTPATIENT
Start: 2022-04-09 | End: 2022-04-09

## 2022-04-09 RX ORDER — ACETAMINOPHEN 500 MG
1000 TABLET ORAL 4 TIMES DAILY PRN
Qty: 60 TABLET | Refills: 0 | Status: SHIPPED | OUTPATIENT
Start: 2022-04-09 | End: 2022-08-30 | Stop reason: SDUPTHER

## 2022-04-09 RX ADMIN — ACETAMINOPHEN 1000 MG: 500 TABLET ORAL at 22:46

## 2022-04-09 ASSESSMENT — PAIN DESCRIPTION - FREQUENCY: FREQUENCY: CONTINUOUS

## 2022-04-09 ASSESSMENT — PAIN DESCRIPTION - DESCRIPTORS: DESCRIPTORS: HEADACHE

## 2022-04-09 ASSESSMENT — PAIN - FUNCTIONAL ASSESSMENT: PAIN_FUNCTIONAL_ASSESSMENT: 0-10

## 2022-04-09 ASSESSMENT — PAIN SCALES - GENERAL: PAINLEVEL_OUTOF10: 8

## 2022-04-09 ASSESSMENT — PAIN DESCRIPTION - PAIN TYPE: TYPE: ACUTE PAIN

## 2022-04-10 ASSESSMENT — ENCOUNTER SYMPTOMS
PHOTOPHOBIA: 0
VOMITING: 0
NAUSEA: 0
COUGH: 0
SHORTNESS OF BREATH: 0
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
## 2022-04-16 ENCOUNTER — HOSPITAL ENCOUNTER (EMERGENCY)
Age: 42
Discharge: LEFT BEFORE DISCHARGE | End: 2022-04-16

## 2022-04-16 ENCOUNTER — HOSPITAL ENCOUNTER (EMERGENCY)
Age: 42
Discharge: HOME | End: 2022-04-16
Attending: EMERGENCY MEDICINE
Payer: MEDICARE

## 2022-04-16 VITALS
DIASTOLIC BLOOD PRESSURE: 90 MMHG | TEMPERATURE: 97.5 F | SYSTOLIC BLOOD PRESSURE: 132 MMHG | OXYGEN SATURATION: 96 % | HEART RATE: 88 BPM | HEIGHT: 66 IN | WEIGHT: 175 LBS | BODY MASS INDEX: 28.12 KG/M2

## 2022-04-16 DIAGNOSIS — Z48.02 ENCOUNTER FOR STAPLE REMOVAL: Primary | ICD-10-CM

## 2022-04-16 PROCEDURE — 99282 EMERGENCY DEPT VISIT SF MDM: CPT

## 2022-04-16 ASSESSMENT — ENCOUNTER SYMPTOMS
SHORTNESS OF BREATH: 0
FACIAL SWELLING: 0
VOMITING: 0
COUGH: 0
ABDOMINAL PAIN: 0

## 2022-04-17 NOTE — ED PROVIDER NOTES
[facility] ED  Emergency Department Encounter  Emergency Medicine Resident     Pt Name: Pepper Vanegas  MRN: [id number]  [place] 1980  Date of evaluation: 4/16/22  PCP:  Felicia Gutiérrez MD    [address] [place] [address]       Chief Complaint   Patient presents with    Suture / Staple Removal       HISTORY OFPRESENT ILLNESS  (Location/Symptom, Timing/Onset, Context/Setting, Quality, Duration, Modifying Factors,Severity.)      Pepper Vanegas is a 39 y. o.yo female who presents with encounter for staple removal over posterior scalp. Patient denies any headache. Patient has no complaints today. She also denies any shortness of breath or chest pain. Patient reports that staples were placed in a week ago and she was told to come in 7 days to have removed. PAST MEDICAL / SURGICAL / SOCIAL / FAMILY HISTORY      has a past medical history of ADD (attention deficit disorder), ADHD (attention deficit hyperactivity disorder), Bipolar 1 disorder ([address] Bronchitis, Constipation, Depression, Diarrhea, Difficulty swallowing, Dizziness, Fibromyalgia, Fibromyalgia, Hammertoe of right foot, Headache, HLD (hyperlipidemia), IBS (irritable bowel syndrome), MRSA (methicillin resistant staph aureus) culture positive, Nausea & vomiting, Nervously anxious, SOB (shortness of breath), Thyroid disorder, Wears glasses, and Weight gain. has a past surgical history that includes other surgical history; Dilation and curettage of uterus; Tonsillectomy; pre-malignant / benign skin lesion excision (Left, 02/24/2017); EXCISION / BIOPSY SKIN LESION OF LEG (Left, 2/24/2017); and Hammer toe surgery (Right, 2/3/2021).      Social History     Socioeconomic History    Marital status:      Spouse name: Not on file    Number of children: Not on file    Years of education: Not on file    Highest education level: Not on file   Occupational History    Not on file   Tobacco Use    Smoking status: Never Smoker    Smokeless tobacco: Never Used    Tobacco comment: Marijuana only never tobacco   Vaping Use    Vaping Use: Never used   Substance and Sexual Activity    Alcohol use: Yes     Comment: Occasional    Drug use: Yes     Comment: marijuana daily last time 2/2/21    Sexual activity: Yes     Partners: Male   Other Topics Concern    Not on file   Social History Narrative    Not on file     Social Determinants of Health     Financial Resource Strain:     Difficulty of Paying Living Expenses: Not on file   Food Insecurity:     Worried About Running Out of Food in the Last Year: Not on file    Keith of Food in the Last Year: Not on file   Transportation Needs:     Lack of Transportation (Medical): Not on file    Lack of Transportation (Non-Medical):  Not on file   Physical Activity:     Days of Exercise per Week: Not on file    Minutes of Exercise per Session: Not on file   Stress:     Feeling of Stress : Not on file   Social Connections:     Frequency of Communication with Friends and Family: Not on file    Frequency of Social Gatherings with Friends and Family: Not on file    Attends Yarsanism Services: Not on file    Active Member of [address] [place] [address] [facility] or Organizations: Not on file    Attends Club or Organization Meetings: Not on file    Marital Status: Not on file   Intimate Partner Violence:     Fear of Current or Ex-Partner: Not on file    Emotionally Abused: Not on file    Physically Abused: Not on file    Sexually Abused: Not on file   Housing Stability:     Unable to Pay for Housing in the Last Year: Not on file    Number of [place] in the Last Year: Not on file    Unstable Housing in the Last Year: Not on file       Family History   Problem Relation Age of Onset    Hypertension Mother     Diabetes Father     Brain Cancer Maternal Grandmother     Heart Defect Maternal Grandfather     Stroke Maternal Aunt         Allergies:  Neomycin-bacitracin zn-polymyx and Sulfamethoxazole-trimethoprim    Home Medications:  Prior to Admission medications    Medication Sig Start Date End Date Taking? Authorizing Provider   acetaminophen (TYLENOL) 500 MG tablet Take 2 tablets by mouth 4 times daily as needed for Pain 4/9/22   Wyatt Fink DO   ibuprofen (IBU) 800 MG tablet Take 1 tablet by mouth every 8 hours as needed for Pain 4/9/22   Wyatt Fink DO   gabapentin (NEURONTIN) 300 MG capsule take 1 capsule by mouth three times a day 3/8/22 4/7/22  Sonny Harrington MD   lidocaine (LIDODERM) 5 % Place 1 patch onto the skin daily 12 hours on, 12 hours off. 11/18/21   Eros Rudd DO   ARIPiprazole (ABILIFY) 5 MG tablet take 1 tablet by mouth nightly  Patient not taking: Reported on 12/3/2021 9/7/21   Sonny Harrington MD   ciclopirox (PENLAC) 8 % solution Apply topically nightly. Patient not taking: Reported on 12/3/2021 8/11/21   Sonny Harrington MD   atorvastatin (LIPITOR) 10 MG tablet take 1 tablet by mouth at bedtime  Patient not taking: Reported on 12/3/2021 3/9/21   Historical Provider, MD   aspirin 81 MG EC tablet Take 81 mg by mouth daily  Patient not taking: Reported on 12/3/2021    Historical Provider, MD       REVIEW OFSYSTEMS    (2-9 systems for level 4, 10 or more for level 5)      Review of Systems   Constitutional: Negative for fatigue and fever. HENT: Negative for facial swelling and hearing loss. Staple removal over posterior scalp   Respiratory: Negative for cough and shortness of breath. Gastrointestinal: Negative for abdominal pain and vomiting. Skin: Positive for wound. PHYSICAL EXAM   (up to 7 for level 4, 8 or more forlevel 5)      INITIAL VITALS:   ED Triage Vitals   BP Temp Temp src Pulse Resp SpO2 Height Weight   04/16/22 2115 04/16/22 2115 -- 04/16/22 2115 -- 04/16/22 2115 04/16/22 2113 04/16/22 2113   (!) 132/90 97.5 °F (36.4 °C)  88  96 % 5' 6\" (1.676 m) 175 lb (79.4 kg)       Physical Exam  Constitutional:       Appearance: Normal appearance.    HENT: Head:      Comments: Presence of one staple over posterior scalp without any wound dehiscence     Mouth/Throat:      Mouth: Mucous membranes are moist.   Eyes:      Pupils: Pupils are equal, round, and reactive to light. Cardiovascular:      Rate and Rhythm: Normal rate. Pulmonary:      Effort: No respiratory distress. Abdominal:      General: There is no distension. Tenderness: There is no abdominal tenderness. Musculoskeletal:         General: No swelling. Cervical back: No rigidity. Skin:     Coloration: Skin is not jaundiced. Neurological:      General: No focal deficit present. Mental Status: She is alert. Psychiatric:         Mood and Affect: Mood normal.         DIFFERENTIAL  DIAGNOSIS     PLAN (LABS / IMAGING / EKG):  No orders of the defined types were placed in this encounter. MEDICATIONS ORDERED:  No orders of the defined types were placed in this encounter. Initial MDM/Plan: 39 y.o. female who presents with encounter for staple removal.  1 staple that is over the posterior scalp has been removed. No wound dehiscence noted. Patient to be discharged    DIAGNOSTIC RESULTS / EMERGENCYDEPARTMENT COURSE / MDM     LABS:  Labs Reviewed - No data to display      RADIOLOGY:  No results found. EKG      All EKG's are interpreted by the Emergency Department Physicianwho either signs or Co-signs this chart in the absence of a cardiologist.    EMERGENCY DEPARTMENT COURSE:          PROCEDURES:  None    CONSULTS:  None    CRITICAL CARE:      FINAL IMPRESSION      1.  Encounter for staple removal          DISPOSITION / PLAN     DISPOSITION Decision To Discharge 04/16/2022 09:53:08 PM      PATIENT REFERRED TO:  [facility]  [address] [facility] [id number] [phone]    If symptoms worsen    Ileana Lopez MD  [address] [place] [address] Drive,  [address]  [phone]      As needed      DISCHARGE MEDICATIONS:  New Prescriptions No medications on file       Esther Batres MD  Emergency Medicine Resident    (Please note that portions of this note were completed with a voice recognition program.Efforts were made to edit the dictations but occasionally words are mis-transcribed.)        Esther Batres MD  Resident  04/16/22 0194

## 2022-04-17 NOTE — ED PROVIDER NOTES
[address] ED     Emergency Department     Faculty Attestation    I performed a history and physical examination of the patient and discussed management with the resident. I reviewed the residents note and agree with the documented findings and plan of care. Any areas of disagreement are noted on the chart. I was personally present for the key portions of any procedures. I have documented in the chart those procedures where I was not present during the key portions. I have reviewed the emergency nurses triage note. I agree with the chief complaint, past medical history, past surgical history, allergies, medications, social and family history as documented unless otherwise noted below. For Physician Assistant/ Nurse Practitioner cases/documentation I have personally evaluated this patient and have completed at least one if not all key elements of the E/M (history, physical exam, and MDM). Additional findings are as noted. Patient presents requesting staple removal.  Patient had a staple placed about a week ago after she hit her head on the corner of a dresser. She says she has been well since then and has not had any problems with the wound. The resident was able to remove the staple without difficulty. The wound appears to be healing well without any surrounding erythema, warmth, drainage. Will discharge.       Mikie Garcia MD  Attending Emergency  Physician              Yolande Hicks MD  04/16/22 6680

## 2022-05-11 ENCOUNTER — TELEPHONE (OUTPATIENT)
Dept: FAMILY MEDICINE CLINIC | Age: 42
End: 2022-05-11

## 2022-05-11 NOTE — TELEPHONE ENCOUNTER
Patient called in stating that she dropped off some paperwork to get filled out and she stated that the received the paperwork but it was not all the way filled out correctly and it needs to be re done.     Please advise

## 2022-05-26 DIAGNOSIS — M25.531 PAIN, WRIST, RIGHT: ICD-10-CM

## 2022-05-26 DIAGNOSIS — R20.0 NUMBNESS AND TINGLING IN RIGHT HAND: ICD-10-CM

## 2022-05-26 DIAGNOSIS — R20.2 NUMBNESS AND TINGLING IN RIGHT HAND: ICD-10-CM

## 2022-05-27 RX ORDER — GABAPENTIN 300 MG/1
CAPSULE ORAL
Qty: 90 CAPSULE | Refills: 1 | Status: SHIPPED | OUTPATIENT
Start: 2022-05-27 | End: 2022-06-26

## 2022-05-27 NOTE — TELEPHONE ENCOUNTER
Goldy Guerrero is calling to request a refill on the following medication(s):    Last Visit Date (If Applicable):  9/05/1778    Next Visit Date:    Visit date not found    Medication Request:  Requested Prescriptions     Pending Prescriptions Disp Refills    gabapentin (NEURONTIN) 300 MG capsule [Pharmacy Med Name: GABAPENTIN 300 MG CAPSULE] 90 capsule 1     Sig: take 1 capsule by mouth three times a day

## 2022-08-02 DIAGNOSIS — R20.2 NUMBNESS AND TINGLING IN RIGHT HAND: ICD-10-CM

## 2022-08-02 DIAGNOSIS — M25.531 PAIN, WRIST, RIGHT: ICD-10-CM

## 2022-08-02 DIAGNOSIS — R20.0 NUMBNESS AND TINGLING IN RIGHT HAND: ICD-10-CM

## 2022-08-03 ENCOUNTER — HOSPITAL ENCOUNTER (EMERGENCY)
Age: 42
Discharge: HOME | End: 2022-08-04
Attending: EMERGENCY MEDICINE
Payer: MEDICARE

## 2022-08-03 DIAGNOSIS — R11.2 INTRACTABLE VOMITING WITH NAUSEA, UNSPECIFIED VOMITING TYPE: Primary | ICD-10-CM

## 2022-08-03 LAB
ABSOLUTE EOS #: 0.22 K/UL (ref 0–0.44)
ABSOLUTE IMMATURE GRANULOCYTE: 0.03 K/UL (ref 0–0.3)
ABSOLUTE LYMPH #: 1.02 K/UL (ref 1.1–3.7)
ABSOLUTE MONO #: 0.46 K/UL (ref 0.1–1.2)
ALBUMIN SERPL-MCNC: 3.6 G/DL (ref 3.5–5.2)
ALBUMIN/GLOBULIN RATIO: 1.3 (ref 1–2.5)
ALP BLD-CCNC: 49 U/L (ref 35–104)
ALT SERPL-CCNC: 12 U/L (ref 5–33)
ANION GAP SERPL CALCULATED.3IONS-SCNC: 9 MMOL/L (ref 9–17)
AST SERPL-CCNC: 15 U/L
BASOPHILS # BLD: 0 % (ref 0–2)
BASOPHILS ABSOLUTE: <0.03 K/UL (ref 0–0.2)
BILIRUB SERPL-MCNC: <0.1 MG/DL (ref 0.3–1.2)
BILIRUBIN URINE: NEGATIVE
BUN BLDV-MCNC: 9 MG/DL (ref 6–20)
CALCIUM SERPL-MCNC: 9 MG/DL (ref 8.6–10.4)
CHLORIDE BLD-SCNC: 108 MMOL/L (ref 98–107)
CO2: 22 MMOL/L (ref 20–31)
COLOR: YELLOW
COMMENT UA: NORMAL
CREAT SERPL-MCNC: 0.59 MG/DL (ref 0.5–0.9)
EOSINOPHILS RELATIVE PERCENT: 2 % (ref 1–4)
GFR AFRICAN AMERICAN: >60 ML/MIN
GFR NON-AFRICAN AMERICAN: >60 ML/MIN
GFR SERPL CREATININE-BSD FRML MDRD: ABNORMAL ML/MIN/{1.73_M2}
GLUCOSE BLD-MCNC: 100 MG/DL (ref 70–99)
GLUCOSE URINE: NEGATIVE
HCG QUALITATIVE: NEGATIVE
HCT VFR BLD CALC: 35.3 % (ref 36.3–47.1)
HEMOGLOBIN: 12 G/DL (ref 11.9–15.1)
IMMATURE GRANULOCYTES: 0 %
KETONES, URINE: NEGATIVE
LEUKOCYTE ESTERASE, URINE: NEGATIVE
LIPASE: 51 U/L (ref 13–60)
LYMPHOCYTES # BLD: 10 % (ref 24–43)
MCH RBC QN AUTO: 30.4 PG (ref 25.2–33.5)
MCHC RBC AUTO-ENTMCNC: 34 G/DL (ref 28.4–34.8)
MCV RBC AUTO: 89.4 FL (ref 82.6–102.9)
MONOCYTES # BLD: 5 % (ref 3–12)
NITRITE, URINE: NEGATIVE
NRBC AUTOMATED: 0 PER 100 WBC
PDW BLD-RTO: 12 % (ref 11.8–14.4)
PH UA: 6.5 (ref 5–8)
PLATELET # BLD: ABNORMAL K/UL (ref 138–453)
POTASSIUM SERPL-SCNC: 4.2 MMOL/L (ref 3.7–5.3)
PROTEIN UA: NEGATIVE
RBC # BLD: 3.95 M/UL (ref 3.95–5.11)
SARS-COV-2, RAPID: NOT DETECTED
SEG NEUTROPHILS: 82 % (ref 36–65)
SEGMENTED NEUTROPHILS ABSOLUTE COUNT: 8.06 K/UL (ref 1.5–8.1)
SODIUM BLD-SCNC: 139 MMOL/L (ref 135–144)
SPECIFIC GRAVITY UA: 1.01 (ref 1–1.03)
SPECIMEN DESCRIPTION: NORMAL
TOTAL PROTEIN: 6.4 G/DL (ref 6.4–8.3)
TURBIDITY: CLEAR
URINE HGB: NEGATIVE
UROBILINOGEN, URINE: NORMAL
WBC # BLD: 9.8 K/UL (ref 3.5–11.3)

## 2022-08-03 PROCEDURE — 2580000003 HC RX 258

## 2022-08-03 PROCEDURE — 87660 TRICHOMONAS VAGIN DIR PROBE: CPT

## 2022-08-03 PROCEDURE — 87480 CANDIDA DNA DIR PROBE: CPT

## 2022-08-03 PROCEDURE — 84703 CHORIONIC GONADOTROPIN ASSAY: CPT

## 2022-08-03 PROCEDURE — 83690 ASSAY OF LIPASE: CPT

## 2022-08-03 PROCEDURE — 85055 RETICULATED PLATELET ASSAY: CPT

## 2022-08-03 PROCEDURE — 99285 EMERGENCY DEPT VISIT HI MDM: CPT

## 2022-08-03 PROCEDURE — 85025 COMPLETE CBC W/AUTO DIFF WBC: CPT

## 2022-08-03 PROCEDURE — 6360000002 HC RX W HCPCS

## 2022-08-03 PROCEDURE — 80053 COMPREHEN METABOLIC PANEL: CPT

## 2022-08-03 PROCEDURE — 87635 SARS-COV-2 COVID-19 AMP PRB: CPT

## 2022-08-03 PROCEDURE — 87491 CHLMYD TRACH DNA AMP PROBE: CPT

## 2022-08-03 PROCEDURE — 96375 TX/PRO/DX INJ NEW DRUG ADDON: CPT

## 2022-08-03 PROCEDURE — 81003 URINALYSIS AUTO W/O SCOPE: CPT

## 2022-08-03 PROCEDURE — 96374 THER/PROPH/DIAG INJ IV PUSH: CPT

## 2022-08-03 PROCEDURE — 87510 GARDNER VAG DNA DIR PROBE: CPT

## 2022-08-03 PROCEDURE — 87591 N.GONORRHOEAE DNA AMP PROB: CPT

## 2022-08-03 RX ORDER — SODIUM CHLORIDE 9 MG/ML
1000 INJECTION, SOLUTION INTRAVENOUS CONTINUOUS
Status: ACTIVE | OUTPATIENT
Start: 2022-08-03 | End: 2022-08-03

## 2022-08-03 RX ORDER — MORPHINE SULFATE 4 MG/ML
4 INJECTION, SOLUTION INTRAMUSCULAR; INTRAVENOUS ONCE
Status: COMPLETED | OUTPATIENT
Start: 2022-08-03 | End: 2022-08-03

## 2022-08-03 RX ORDER — ACETAMINOPHEN 325 MG/1
650 TABLET ORAL ONCE
Status: CANCELLED | OUTPATIENT
Start: 2022-08-03 | End: 2022-08-03

## 2022-08-03 RX ORDER — FENTANYL CITRATE 50 UG/ML
25 INJECTION, SOLUTION INTRAMUSCULAR; INTRAVENOUS ONCE
Status: COMPLETED | OUTPATIENT
Start: 2022-08-03 | End: 2022-08-03

## 2022-08-03 RX ORDER — ONDANSETRON 2 MG/ML
4 INJECTION INTRAMUSCULAR; INTRAVENOUS ONCE
Status: COMPLETED | OUTPATIENT
Start: 2022-08-03 | End: 2022-08-03

## 2022-08-03 RX ORDER — GABAPENTIN 300 MG/1
CAPSULE ORAL
Qty: 90 CAPSULE | Refills: 1 | OUTPATIENT
Start: 2022-08-03 | End: 2022-09-02

## 2022-08-03 RX ADMIN — ONDANSETRON 4 MG: 2 INJECTION INTRAMUSCULAR; INTRAVENOUS at 22:42

## 2022-08-03 RX ADMIN — SODIUM CHLORIDE 1000 ML: 9 INJECTION, SOLUTION INTRAVENOUS at 22:42

## 2022-08-03 RX ADMIN — MORPHINE SULFATE 4 MG: 4 INJECTION INTRAVENOUS at 22:50

## 2022-08-03 RX ADMIN — FENTANYL CITRATE 25 MCG: 50 INJECTION, SOLUTION INTRAMUSCULAR; INTRAVENOUS at 23:46

## 2022-08-03 ASSESSMENT — ENCOUNTER SYMPTOMS
CHEST TIGHTNESS: 0
DIARRHEA: 1
VOMITING: 1
BLOOD IN STOOL: 0
SHORTNESS OF BREATH: 0
CONSTIPATION: 1
ABDOMINAL PAIN: 1
NAUSEA: 1
COUGH: 0

## 2022-08-03 ASSESSMENT — PAIN DESCRIPTION - ORIENTATION: ORIENTATION: RIGHT;LOWER

## 2022-08-03 ASSESSMENT — PAIN - FUNCTIONAL ASSESSMENT
PAIN_FUNCTIONAL_ASSESSMENT: ACTIVITIES ARE NOT PREVENTED
PAIN_FUNCTIONAL_ASSESSMENT: 0-10

## 2022-08-03 ASSESSMENT — PAIN SCALES - GENERAL
PAINLEVEL_OUTOF10: 8
PAINLEVEL_OUTOF10: 10

## 2022-08-03 ASSESSMENT — PAIN DESCRIPTION - LOCATION
LOCATION: ABDOMEN
LOCATION: ABDOMEN;BACK
LOCATION: ABDOMEN
LOCATION: ABDOMEN

## 2022-08-03 NOTE — TELEPHONE ENCOUNTER
Kasandra Moore is calling to request a refill on the following medication(s):    Last Visit Date (If Applicable):  Visit date not found    Next Visit Date:    Visit date not found    Medication Request:  Requested Prescriptions     Pending Prescriptions Disp Refills    gabapentin (NEURONTIN) 300 MG capsule [Pharmacy Med Name: GABAPENTIN 300 MG CAPSULE] 90 capsule 1     Sig: take 1 capsule by mouth three times a day

## 2022-08-04 ENCOUNTER — APPOINTMENT (OUTPATIENT)
Dept: CT IMAGING | Age: 42
End: 2022-08-04
Payer: MEDICARE

## 2022-08-04 VITALS
SYSTOLIC BLOOD PRESSURE: 146 MMHG | OXYGEN SATURATION: 99 % | BODY MASS INDEX: 28.25 KG/M2 | WEIGHT: 175 LBS | RESPIRATION RATE: 15 BRPM | HEART RATE: 54 BPM | DIASTOLIC BLOOD PRESSURE: 83 MMHG | TEMPERATURE: 98.2 F

## 2022-08-04 LAB
C TRACH DNA GENITAL QL NAA+PROBE: NEGATIVE
CANDIDA SPECIES, DNA PROBE: NEGATIVE
GARDNERELLA VAGINALIS, DNA PROBE: POSITIVE
LACTIC ACID, WHOLE BLOOD: 1.9 MMOL/L (ref 0.7–2.1)
N. GONORRHOEAE DNA: NEGATIVE
PLATELET, FLUORESCENCE: NORMAL K/UL (ref 138–453)
SOURCE: ABNORMAL
SPECIMEN DESCRIPTION: NORMAL
TRICHOMONAS VAGINALIS DNA: NEGATIVE

## 2022-08-04 PROCEDURE — 2500000003 HC RX 250 WO HCPCS

## 2022-08-04 PROCEDURE — 83605 ASSAY OF LACTIC ACID: CPT

## 2022-08-04 PROCEDURE — 6360000002 HC RX W HCPCS

## 2022-08-04 PROCEDURE — A4216 STERILE WATER/SALINE, 10 ML: HCPCS

## 2022-08-04 PROCEDURE — 6360000004 HC RX CONTRAST MEDICATION

## 2022-08-04 PROCEDURE — 74177 CT ABD & PELVIS W/CONTRAST: CPT

## 2022-08-04 PROCEDURE — 2580000003 HC RX 258

## 2022-08-04 PROCEDURE — C9113 INJ PANTOPRAZOLE SODIUM, VIA: HCPCS

## 2022-08-04 RX ORDER — FAMOTIDINE 10 MG/ML
20 INJECTION, SOLUTION INTRAVENOUS ONCE
Status: COMPLETED | OUTPATIENT
Start: 2022-08-04 | End: 2022-08-04

## 2022-08-04 RX ORDER — ONDANSETRON 4 MG/1
4 TABLET, FILM COATED ORAL 3 TIMES DAILY PRN
Qty: 30 TABLET | Refills: 0 | Status: SHIPPED | OUTPATIENT
Start: 2022-08-04

## 2022-08-04 RX ORDER — 0.9 % SODIUM CHLORIDE 0.9 %
500 INTRAVENOUS SOLUTION INTRAVENOUS ONCE
Status: COMPLETED | OUTPATIENT
Start: 2022-08-04 | End: 2022-08-04

## 2022-08-04 RX ORDER — PANTOPRAZOLE SODIUM 20 MG/1
20 TABLET, DELAYED RELEASE ORAL
Qty: 30 TABLET | Refills: 0 | Status: SHIPPED | OUTPATIENT
Start: 2022-08-04

## 2022-08-04 RX ORDER — MORPHINE SULFATE 4 MG/ML
4 INJECTION, SOLUTION INTRAMUSCULAR; INTRAVENOUS ONCE
Status: COMPLETED | OUTPATIENT
Start: 2022-08-04 | End: 2022-08-04

## 2022-08-04 RX ADMIN — MORPHINE SULFATE 4 MG: 4 INJECTION INTRAVENOUS at 01:08

## 2022-08-04 RX ADMIN — IOPAMIDOL 75 ML: 755 INJECTION, SOLUTION INTRAVENOUS at 01:25

## 2022-08-04 RX ADMIN — SODIUM CHLORIDE 40 MG: 9 INJECTION, SOLUTION INTRAMUSCULAR; INTRAVENOUS; SUBCUTANEOUS at 01:38

## 2022-08-04 RX ADMIN — SODIUM CHLORIDE 500 ML: 9 INJECTION, SOLUTION INTRAVENOUS at 01:12

## 2022-08-04 RX ADMIN — SODIUM CHLORIDE 500 ML: 9 INJECTION, SOLUTION INTRAVENOUS at 01:13

## 2022-08-04 RX ADMIN — FAMOTIDINE 20 MG: 10 INJECTION, SOLUTION INTRAVENOUS at 01:08

## 2022-08-04 ASSESSMENT — PAIN SCALES - GENERAL
PAINLEVEL_OUTOF10: 3
PAINLEVEL_OUTOF10: 10

## 2022-08-04 ASSESSMENT — PAIN - FUNCTIONAL ASSESSMENT: PAIN_FUNCTIONAL_ASSESSMENT: NONE - DENIES PAIN

## 2022-08-04 NOTE — ED NOTES
Pt instructed on clean catch technique, verbalizes understanding and ambulates to restroom to obtain specimen         Zaria Ruiz RN  08/03/22 [id number]

## 2022-08-04 NOTE — DISCHARGE INSTRUCTIONS
TAKE THE MEDICINES PRESCRIBED TO YOU AS PRESCRIBED. TAKE PANTOPRAZOLE EVERY DAY BEFORE BREAKFAST. TAKE ZOFRAN EVERY 8 HOURS AS NEEDED FOR NAUSEA , DO NOT TAKE IF NOT NAUSEOUS. DRINK PLENTY OF CLEAR FLUIDS FOR THE NEXT 24 HOURS AND A LOW FAT DIET. AVOID SPICY AND IRRITATING FOODS. CALL TODAY TO SCHEDULE AN APPOINTMENT WITH YOUR PRIMARY CARE DOCTOR [[phone]]. INFORM THEM THAT YOU HAVE RECENTLY BEEN ADMITTED TO THE EMERGENCY DEPARTMENT. RETURN TO THE EMERGENCY DEPARTMENT SHOULD YOUR NAUSEA BECOME WORSE, YOUR DIARRHEA BECOME WORSE, YOUR ABDOMINAL PAIN BECOMES WORSE, IF YOU BEGIN TO NOTICE BLOOD IN YOUR VOMIT OR STOOL, OR IF YOU FEEL WORSE.

## 2022-08-04 NOTE — ED PROVIDER NOTES
Faculty Sign-Out Attestation  Handoff taken on the following patient from prior Attending Physician: Aubrey Marshall    I was available and discussed any additional care issues that arose and coordinated the management plans with the resident(s) caring for the patient during my duty period. Any areas of disagreement with residents documentation of care or procedures are noted on the chart. I was personally present for the key portions of any/all procedures during my duty period. I have documented in the chart those procedures where I was not present during the key portions.     Abdominal pain, dysuria, vaginal discharge,   Swabs pending, lab pending,   Probable treat for cystitis, // +/- disposition    Yudi Claros DO  Attending Physician       Yudi Claros DO  08/03/22 1696    -pain out of proportion to exam, lactate 1.9, ct abdomen > entero colitis, no obstruction  S/s improved, tolerating liquids, given return instruction,   Lipase 51, wbc 9.8, hcg-, AN 9, Cr 0.59,        Yudi Claros DO  08/04/22 0051

## 2022-08-04 NOTE — ED NOTES
Writer present at bedside for pelvic exam done per ER resident, resident reports vaginal specimens unable to be obtained at this time       Shabana Mckeon RN  08/03/22 8752

## 2022-08-04 NOTE — ED PROVIDER NOTES
[address] [facility]     Emergency Department     Faculty Attestation    I performed a history and physical examination of the patient and discussed management with the resident. I reviewed the residents note and agree with the documented findings and plan of care. Any areas of disagreement are noted on the chart. I was personally present for the key portions of any procedures. I have documented in the chart those procedures where I was not present during the key portions. I have reviewed the emergency nurses triage note. I agree with the chief complaint, past medical history, past surgical history, allergies, medications, social and family history as documented unless otherwise noted below. For Physician Assistant/ Nurse Practitioner cases/documentation I have personally evaluated this patient and have completed at least one if not all key elements of the E/M (history, physical exam, and MDM). Additional findings are as noted. Patient presents with abdominal pain, back pain, nausea, vomiting, diarrhea. She says that she has been a little more fatigued than usual over the past couple of days. She had been out of town visiting her grandson but no one there was sick. She says that she rode the train home today and about an hour after getting home she started having vomiting and diarrhea. She says that the abdominal pain started shortly after that. She says she has been having some burning with urination as well. She denies fever, chest pain, shortness of breath, cough. She says she has had some nasal congestion. Patient says she also has been having abnormal thick vaginal discharge and did have a heavier period than usual a couple of weeks ago. On exam, patient is lying in the bed and appears mildly uncomfortable. Lungs clear to auscultation bilaterally and heart sounds are normal.  Abdomen is soft and nontender. No rebound or guarding is present.   There is mild tenderness palpation of the lower back without CVA tenderness on my exam.  The resident will perform a pelvic exam.  Will check labs and treat patient's pain and nausea and reassess.       Donalynn Ormond, MD  Attending Emergency  Physician            Nicol Smith MD  08/03/22 5171

## 2022-08-04 NOTE — ED PROVIDER NOTES
[facility]  ED  Emergency Department Encounter  Emergency Medicine Resident     Pt Scott Canchola  MRN: [id number]  [place] 1980  Date of evaluation: 8/3/22  PCP:  Gunjan Moore MD      [address]       Chief Complaint   Patient presents with    Abdominal Pain    Back Pain    Nausea       HISTORY OF PRESENT ILLNESS  (Location/Symptom, Timing/Onset, Context/Setting, Quality, Duration, Modifying Factors, Severity.)      Tayla Vera is a 39 y.o. female who presents with a 12-hour history of acute onset abdominal pain associated with multiple episodes of vomiting, diarrhea, and chills. Patient states that this pain is novel, constant and severe in severity, and radiates into the back. The patient also endorses a history of dysuria, menorrhagia, and vaginal discharge. Patient denies any previous medical or surgical history. The patient states has been prescribed gabapentin but is poorly compliant. The patient denies any sick contacts, she states that the symptoms appeared on returning from a train trip from [place]. PAST MEDICAL / SURGICAL / SOCIAL / FAMILY HISTORY      has a past medical history of ADD (attention deficit disorder), ADHD (attention deficit hyperactivity disorder), Bipolar 1 disorder ([facility] [address] Bronchitis, Constipation, Depression, Diarrhea, Difficulty swallowing, Dizziness, Fibromyalgia, Fibromyalgia, Hammertoe of right foot, Headache, HLD (hyperlipidemia), IBS (irritable bowel syndrome), MRSA (methicillin resistant staph aureus) culture positive, Nausea & vomiting, Nervously anxious, SOB (shortness of breath), Thyroid disorder, Wears glasses, and Weight gain. has a past surgical history that includes other surgical history; Dilation and curettage of uterus; Tonsillectomy; pre-malignant / benign skin lesion excision (Left, 02/24/2017); EXCISION / BIOPSY SKIN LESION OF LEG (Left, 2/24/2017); and Hammer toe surgery (Right, 2/3/2021).       Social History Socioeconomic History    Marital status:      Spouse name: Not on file    Number of children: Not on file    Years of education: Not on file    Highest education level: Not on file   Occupational History    Not on file   Tobacco Use    Smoking status: Never    Smokeless tobacco: Never    Tobacco comments:     Marijuana only never tobacco   Vaping Use    Vaping Use: Never used   Substance and Sexual Activity    Alcohol use: Yes     Comment: Occasional    Drug use: Yes     Comment: marijuana daily last time 2/2/21    Sexual activity: Yes     Partners: Male   Other Topics Concern    Not on file   Social History Narrative    Not on file     Social Determinants of Health     Financial Resource Strain: Not on file   Food Insecurity: Not on file   Transportation Needs: Not on file   Physical Activity: Not on file   Stress: Not on file   Social Connections: Not on file   Intimate Partner Violence: Not on file   Housing Stability: Not on file       Family History   Problem Relation Age of Onset    Hypertension Mother     Diabetes Father     Brain Cancer Maternal Grandmother     Heart Defect Maternal Grandfather     Stroke Maternal Aunt        Allergies:  Neomycin-bacitracin zn-polymyx and Sulfamethoxazole-trimethoprim    Home Medications:  Prior to Admission medications    Medication Sig Start Date End Date Taking? Authorizing Provider   pantoprazole (PROTONIX) 20 MG tablet Take 1 tablet by mouth every morning (before breakfast) 8/4/22  Yes Queen Nehemiah MD   ondansetron (ZOFRAN) 4 MG tablet Take 1 tablet by mouth 3 times daily as needed for Nausea or Vomiting (Take every 8 hours as needed for nausea and vomiting.  Do not take if not nauseous.) 8/4/22  Yes Queen Nehemiah MD   gabapentin (NEURONTIN) 300 MG capsule take 1 capsule by mouth three times a day 5/27/22 6/26/22  Kelsey Suarez,    acetaminophen (TYLENOL) 500 MG tablet Take 2 tablets by mouth 4 times daily as needed for Pain 4/9/22   Venus Hahn DO   ibuprofen (IBU) 800 MG tablet Take 1 tablet by mouth every 8 hours as needed for Pain 4/9/22   Trace Fink,    lidocaine (LIDODERM) 5 % Place 1 patch onto the skin daily 12 hours on, 12 hours off. 11/18/21   Marian Lewis,    ARIPiprazole (ABILIFY) 5 MG tablet take 1 tablet by mouth nightly  Patient not taking: Reported on 12/3/2021 9/7/21   Chelly Rahman MD   ciclopirox (PENLAC) 8 % solution Apply topically nightly. Patient not taking: Reported on 12/3/2021 8/11/21   Chelly Rahman MD   atorvastatin (LIPITOR) 10 MG tablet take 1 tablet by mouth at bedtime  Patient not taking: Reported on 12/3/2021 3/9/21   Historical Provider, MD   aspirin 81 MG EC tablet Take 81 mg by mouth daily  Patient not taking: Reported on 12/3/2021    Historical Provider, MD       REVIEW OF SYSTEMS    (2-9 systems for level 4, 10 or more for level 5)      Review of Systems   Constitutional:  Positive for activity change, appetite change, chills and diaphoresis. Eyes:  Negative for visual disturbance. Respiratory:  Negative for cough, chest tightness and shortness of breath. Gastrointestinal:  Positive for abdominal pain, constipation, diarrhea, nausea and vomiting. Negative for blood in stool. Genitourinary:  Positive for dysuria, menstrual problem (LMP was heavier than usual) and vaginal discharge. Negative for hematuria. Skin:  Negative for rash and wound. Neurological:  Negative for syncope and numbness. PHYSICAL EXAM   (up to 7 for level 4, 8 or more for level 5)      INITIAL VITALS:   BP (!) 146/83   Pulse 54   Temp 98.2 °F (36.8 °C) (Oral)   Resp 15   Wt 175 lb (79.4 kg)   LMP 07/12/2022 (Approximate)   SpO2 99%   BMI 28.25 kg/m²     Physical Exam  Exam conducted with a chaperone present. Constitutional:       Appearance: She is ill-appearing. HENT:      Mouth/Throat:      Mouth: Mucous membranes are moist.   Abdominal:      General: There are no signs of injury.       Palpations: Abdomen is soft. There is no hepatomegaly, splenomegaly, mass or pulsatile mass. Tenderness: There is generalized abdominal tenderness. There is left CVA tenderness. There is no guarding or rebound. Genitourinary:     Exam position: Lithotomy position. Pubic Area: No rash or pubic lice. Jose stage (genital): 5. Labia:         Right: No rash, tenderness, lesion or injury. Left: No rash, tenderness, lesion or injury. Urethra: No prolapse. Vagina: Vaginal discharge (thin white discharge) present. No erythema, tenderness, bleeding or prolapsed vaginal walls. Cervix: No cervical motion tenderness, friability, erythema or cervical bleeding. Adnexa:         Right: No mass or tenderness. Left: No mass or tenderness. Comments: Accompanied by nurse Jluis Dean (first attempt) and then by nurse Joel Hyman and Dr. Justin Canada (second attempt). Two attempts were made as the cervical os could not be visualized on first attempt.        DIFFERENTIAL  DIAGNOSIS     PLAN (LABS / IMAGING / EKG):  Orders Placed This Encounter   Procedures    COVID-19, Rapid    C.trachomatis N.gonorrhoeae DNA (Cervical or Vaginal Swab)    Vaginitis DNA Probe    CT ABDOMEN PELVIS W IV CONTRAST Additional Contrast? None    CBC with Auto Differential    Comprehensive Metabolic Panel    Lipase    Urinalysis with Reflex to Culture    HCG Qualitative, Serum    Immature Platelet Fraction    Lactic Acid    Vaginal exam       MEDICATIONS ORDERED:  Orders Placed This Encounter   Medications    0.9 % sodium chloride infusion    ondansetron (ZOFRAN) injection 4 mg    morphine injection 4 mg    fentaNYL (SUBLIMAZE) injection 25 mcg    morphine injection 4 mg    famotidine (PEPCID) injection 20 mg    0.9 % sodium chloride bolus    iopamidol (ISOVUE-370) 76 % injection 75 mL    0.9 % sodium chloride bolus    pantoprazole (PROTONIX) 40 mg in sodium chloride (PF) 10 mL injection    pantoprazole (PROTONIX) 20 MG tablet     Sig: Take 1 tablet by mouth every morning (before breakfast)     Dispense:  30 tablet     Refill:  0    ondansetron (ZOFRAN) 4 MG tablet     Sig: Take 1 tablet by mouth 3 times daily as needed for Nausea or Vomiting (Take every 8 hours as needed for nausea and vomiting. Do not take if not nauseous.)     Dispense:  30 tablet     Refill:  0       DDX: pancreatitis, pyelonephritis, urinary tract infection, mesenteric ischemia, small/large bowel obstruction, hepatitis, cholecystitis, appendicitis, gastritis, gastroenteritis, pelvic inflammatory disease, and COVID-19 were considered in the differential diagnosis    DIAGNOSTIC RESULTS / EMERGENCY DEPARTMENT COURSE / MDM   LAB RESULTS:  Results for orders placed or performed during the hospital encounter of 08/03/22   COVID-19, Rapid    Specimen: Nasopharyngeal Swab   Result Value Ref Range    Specimen Description . NASOPHARYNGEAL SWAB     SARS-CoV-2, Rapid Not Detected Not Detected   Vaginitis DNA Probe    Specimen: Vaginal   Result Value Ref Range    Source . VAGINAL SWAB     Trichomonas Vaginalis DNA NEGATIVE NEGATIVE    GARDNERELLA VAGINALIS, DNA PROBE POSITIVE (A) NEGATIVE    CANDIDA SPECIES, DNA PROBE NEGATIVE NEGATIVE   CBC with Auto Differential   Result Value Ref Range    WBC 9.8 3.5 - 11.3 k/uL    RBC 3.95 3.95 - 5.11 m/uL    Hemoglobin 12.0 11.9 - 15.1 g/dL    Hematocrit 35.3 (L) 36.3 - 47.1 %    MCV 89.4 82.6 - 102.9 fL    MCH 30.4 25.2 - 33.5 pg    MCHC 34.0 28.4 - 34.8 g/dL    RDW 12.0 11.8 - 14.4 %    Platelets See Reflexed IPF Result 138 - 453 k/uL    NRBC Automated 0.0 0.0 per 100 WBC    Seg Neutrophils 82 (H) 36 - 65 %    Lymphocytes 10 (L) 24 - 43 %    Monocytes 5 3 - 12 %    Eosinophils % 2 1 - 4 %    Basophils 0 0 - 2 %    Immature Granulocytes 0 0 %    Segs Absolute 8.06 1.50 - 8.10 k/uL    Absolute Lymph # 1.02 (L) 1.10 - 3.70 k/uL    Absolute Mono # 0.46 0.10 - 1.20 k/uL    Absolute Eos # 0.22 0.00 - 0.44 k/uL    Basophils Absolute <0.03 0.00 - 0.20 k/uL    Absolute Immature Granulocyte 0.03 0.00 - 0.30 k/uL   Comprehensive Metabolic Panel   Result Value Ref Range    Glucose 100 (H) 70 - 99 mg/dL    BUN 9 6 - 20 mg/dL    Creatinine 0.59 0.50 - 0.90 mg/dL    Calcium 9.0 8.6 - 10.4 mg/dL    Sodium 139 135 - 144 mmol/L    Potassium 4.2 3.7 - 5.3 mmol/L    Chloride 108 (H) 98 - 107 mmol/L    CO2 22 20 - 31 mmol/L    Anion Gap 9 9 - 17 mmol/L    Alkaline Phosphatase 49 35 - 104 U/L    ALT 12 5 - 33 U/L    AST 15 <32 U/L    Total Bilirubin <0.10 (L) 0.3 - 1.2 mg/dL    Total Protein 6.4 6.4 - 8.3 g/dL    Albumin 3.6 3.5 - 5.2 g/dL    Albumin/Globulin Ratio 1.3 1.0 - 2.5    GFR Non-African American >60 >60 mL/min    GFR African American >60 >60 mL/min    GFR Comment         Lipase   Result Value Ref Range    Lipase 51 13 - 60 U/L   Urinalysis with Reflex to Culture    Specimen: Urine, clean catch   Result Value Ref Range    Color, UA Yellow Yellow    Turbidity UA Clear Clear    Glucose, Ur NEGATIVE NEGATIVE    Bilirubin Urine NEGATIVE NEGATIVE    Ketones, Urine NEGATIVE NEGATIVE    Specific Gravity, UA 1.015 1.005 - 1.030    Urine Hgb NEGATIVE NEGATIVE    pH, UA 6.5 5.0 - 8.0    Protein, UA NEGATIVE NEGATIVE    Urobilinogen, Urine Normal Normal    Nitrite, Urine NEGATIVE NEGATIVE    Leukocyte Esterase, Urine NEGATIVE NEGATIVE    Urinalysis Comments       Microscopic exam not performed based on chemical results unless requested in original order. HCG Qualitative, Serum   Result Value Ref Range    hCG Qual NEGATIVE NEGATIVE   Immature Platelet Fraction   Result Value Ref Range    Platelet, Fluorescence Platelet clumps present, count appears adequate. 138 - 453 k/uL   Lactic Acid   Result Value Ref Range    Lactic Acid, Whole Blood 1.9 0.7 - 2.1 mmol/L       IMPRESSION: Intractable nausea and vomiting    RADIOLOGY:  CT ABDOMEN PELVIS W IV CONTRAST Additional Contrast? None   Final Result   1.   Mildly dilated jejunal loops gradually tapering to nondilated but still   gas and fluid-filled small bowel flowing into the gas and fluid-filled right   hemicolon. Mild thickening of the mucosal folds in the jejunum and at least   a short segment of wall thickening. More pronounced inflammation and mural   enhancement at the sigmoid and rectum. There is no obstruction, portal   venous gas, or pneumoperitoneum. Features suggest enterocolitis without a   focal obstruction. 2.  Biliary system is not dilated. Small amount of fluid in the renal   calices without hydronephrosis or hydroureter. EKG    All EKG's are interpreted by the Emergency Department Physician who either signs or Co-signs this chart in the absence of a cardiologist.    EMERGENCY DEPARTMENT COURSE:  Patient provided history and examination. Patient provided 4 mg IV morphine and ondansetron for analgesia and nausea control, respectively. An additional 25 mcg of fentanyl was added. The patient's pain was still not controlled-4 mg IV morphine prescribed. Patient's initial investigations came back unremarkable, with the exception of positive bacterial vaginosis probe. Given the patient's symptoms vomiting and severe abdominal pain not matching the picture of bacterial vaginosis a CT scan and sepsis were ordered to search for other etiologies behind the patient's abdominal pain. CT scan shows mild wall thickening and some mucosal inflammation. The patient symptoms were managed in the emergency department and the patient was discharged on pantoprazole and ondansetron. The patient was given the contact information for [address] [place] [address] and told to contact them in the morning to schedule an appointment ASAP. The patient was informed that or her symptoms to worsen she is to return to the emergency department. No notes of  Admission Criteria type on file. PROCEDURES:  Pelvic examination was performed. The first attempt was unsuccessful at visualizing the cervical os.   Nurse Chelsie Edwards was present. The second attempt was successful at visualizing the cervical os with the aid of Dr. Mari Abrams and nurse Josiah Restrepo as chaperone. Vaginitis and chlamydia plus gonorrhea probes collected. CONSULTS:  None    CRITICAL CARE:  None         FINAL IMPRESSION      1. Intractable vomiting with nausea, unspecified vomiting type          DISPOSITION / PLAN     DISPOSITION Decision To Discharge 08/04/2022 03:08:27 AM      PATIENT REFERRED TO:  [facility] ED  [address] [facility]  [phone]  Go to   If symptoms worsen. [address] [address] [phone]  Schedule an appointment as soon as possible for a visit today      DISCHARGE MEDICATIONS:  New Prescriptions    ONDANSETRON (ZOFRAN) 4 MG TABLET    Take 1 tablet by mouth 3 times daily as needed for Nausea or Vomiting (Take every 8 hours as needed for nausea and vomiting.  Do not take if not nauseous.)    PANTOPRAZOLE (PROTONIX) 20 MG TABLET    Take 1 tablet by mouth every morning (before breakfast)       Daysi Ly MD  Emergency Medicine Resident    (Please note that portions of thisnote were completed with a voice recognition program.  Efforts were made to edit the dictations but occasionally words are mis-transcribed.)        Daysi Ly MD  Resident  08/04/22 4412

## 2022-08-04 NOTE — ED NOTES
The following labs labeled with pt sticker and tubed to lab:     [] Blue     [] Lavender   [] on ice  [] Green/yellow  [x] Green/black [] on ice  [] Yellow  [] Red  [] Pink      [] COVID-19 swab    [] Rapid  [] PCR  [] Flu swab  [] Peds Viral Panel     [] Urine Sample  [] Pelvic Cultures  [] Blood Cultures            Sandra Bahena RN  08/04/22 0884

## 2022-08-04 NOTE — ED NOTES
The following labs labeled with pt sticker and tubed to lab:     [] Blue     [] Rebecca Qualia   [] on ice  [] Green/yellow  [] Green/black [] on ice  [] Yellow  [] Red  [] Pink      [] COVID-19 swab    [] Rapid  [] PCR  [] Flu swab  [] Peds Viral Panel     [] Urine Sample  [x] Pelvic Cultures  [] Blood Cultures            Lashon Berger RN  08/03/22 0726

## 2022-08-04 NOTE — ED NOTES
Pt resting on stretcher, attached to monitor, RR even and non labored, call light within reach.       Jasson Betancourt RN  08/04/22 9948

## 2022-08-30 ENCOUNTER — OFFICE VISIT (OUTPATIENT)
Dept: FAMILY MEDICINE CLINIC | Age: 42
End: 2022-08-30
Payer: MEDICARE

## 2022-08-30 VITALS
HEART RATE: 73 BPM | WEIGHT: 180.4 LBS | BODY MASS INDEX: 29.12 KG/M2 | DIASTOLIC BLOOD PRESSURE: 71 MMHG | SYSTOLIC BLOOD PRESSURE: 109 MMHG

## 2022-08-30 DIAGNOSIS — M54.2 NECK PAIN: ICD-10-CM

## 2022-08-30 DIAGNOSIS — B96.89 BV (BACTERIAL VAGINOSIS): ICD-10-CM

## 2022-08-30 DIAGNOSIS — Z76.89 ENCOUNTER TO ESTABLISH CARE: ICD-10-CM

## 2022-08-30 DIAGNOSIS — N76.0 BV (BACTERIAL VAGINOSIS): ICD-10-CM

## 2022-08-30 DIAGNOSIS — M25.512 LEFT SHOULDER PAIN, UNSPECIFIED CHRONICITY: ICD-10-CM

## 2022-08-30 DIAGNOSIS — M54.50 CHRONIC MIDLINE LOW BACK PAIN WITHOUT SCIATICA: Primary | ICD-10-CM

## 2022-08-30 DIAGNOSIS — G89.29 CHRONIC MIDLINE LOW BACK PAIN WITHOUT SCIATICA: Primary | ICD-10-CM

## 2022-08-30 PROCEDURE — G8417 CALC BMI ABV UP PARAM F/U: HCPCS

## 2022-08-30 PROCEDURE — G8427 DOCREV CUR MEDS BY ELIG CLIN: HCPCS

## 2022-08-30 PROCEDURE — 1036F TOBACCO NON-USER: CPT

## 2022-08-30 PROCEDURE — 99203 OFFICE O/P NEW LOW 30 MIN: CPT

## 2022-08-30 RX ORDER — METRONIDAZOLE 500 MG/1
500 TABLET ORAL 2 TIMES DAILY
Qty: 14 TABLET | Refills: 0 | Status: SHIPPED | OUTPATIENT
Start: 2022-08-30 | End: 2022-09-06

## 2022-08-30 RX ORDER — IBUPROFEN 600 MG/1
600 TABLET ORAL 2 TIMES DAILY PRN
Qty: 360 TABLET | Refills: 1 | Status: SHIPPED | OUTPATIENT
Start: 2022-08-30

## 2022-08-30 RX ORDER — LIDOCAINE 50 MG/G
1 PATCH TOPICAL DAILY
Qty: 30 PATCH | Refills: 0 | Status: CANCELLED | OUTPATIENT
Start: 2022-08-30

## 2022-08-30 RX ORDER — ACETAMINOPHEN 500 MG
1000 TABLET ORAL 4 TIMES DAILY PRN
Qty: 90 TABLET | Refills: 1 | Status: SHIPPED | OUTPATIENT
Start: 2022-08-30

## 2022-08-30 RX ORDER — LIDOCAINE 50 MG/G
OINTMENT TOPICAL DAILY
Qty: 240 G | Refills: 1 | Status: SHIPPED | OUTPATIENT
Start: 2022-08-30

## 2022-08-30 SDOH — ECONOMIC STABILITY: FOOD INSECURITY: WITHIN THE PAST 12 MONTHS, THE FOOD YOU BOUGHT JUST DIDN'T LAST AND YOU DIDN'T HAVE MONEY TO GET MORE.: NEVER TRUE

## 2022-08-30 SDOH — ECONOMIC STABILITY: FOOD INSECURITY: WITHIN THE PAST 12 MONTHS, YOU WORRIED THAT YOUR FOOD WOULD RUN OUT BEFORE YOU GOT MONEY TO BUY MORE.: NEVER TRUE

## 2022-08-30 ASSESSMENT — PATIENT HEALTH QUESTIONNAIRE - PHQ9
SUM OF ALL RESPONSES TO PHQ QUESTIONS 1-9: 5
SUM OF ALL RESPONSES TO PHQ QUESTIONS 1-9: 5
1. LITTLE INTEREST OR PLEASURE IN DOING THINGS: 1
SUM OF ALL RESPONSES TO PHQ QUESTIONS 1-9: 5
SUM OF ALL RESPONSES TO PHQ9 QUESTIONS 1 & 2: 2
8. MOVING OR SPEAKING SO SLOWLY THAT OTHER PEOPLE COULD HAVE NOTICED. OR THE OPPOSITE, BEING SO FIGETY OR RESTLESS THAT YOU HAVE BEEN MOVING AROUND A LOT MORE THAN USUAL: 0
7. TROUBLE CONCENTRATING ON THINGS, SUCH AS READING THE NEWSPAPER OR WATCHING TELEVISION: 0
5. POOR APPETITE OR OVEREATING: 1
10. IF YOU CHECKED OFF ANY PROBLEMS, HOW DIFFICULT HAVE THESE PROBLEMS MADE IT FOR YOU TO DO YOUR WORK, TAKE CARE OF THINGS AT HOME, OR GET ALONG WITH OTHER PEOPLE: 0
9. THOUGHTS THAT YOU WOULD BE BETTER OFF DEAD, OR OF HURTING YOURSELF: 0
4. FEELING TIRED OR HAVING LITTLE ENERGY: 1
2. FEELING DOWN, DEPRESSED OR HOPELESS: 1
SUM OF ALL RESPONSES TO PHQ QUESTIONS 1-9: 5
3. TROUBLE FALLING OR STAYING ASLEEP: 1
6. FEELING BAD ABOUT YOURSELF - OR THAT YOU ARE A FAILURE OR HAVE LET YOURSELF OR YOUR FAMILY DOWN: 0

## 2022-08-30 ASSESSMENT — ENCOUNTER SYMPTOMS
VOMITING: 0
SORE THROAT: 0
COUGH: 0
BACK PAIN: 1
NAUSEA: 0
SHORTNESS OF BREATH: 0
ABDOMINAL PAIN: 0

## 2022-08-30 ASSESSMENT — SOCIAL DETERMINANTS OF HEALTH (SDOH): HOW HARD IS IT FOR YOU TO PAY FOR THE VERY BASICS LIKE FOOD, HOUSING, MEDICAL CARE, AND HEATING?: NOT HARD AT ALL

## 2022-08-30 NOTE — PROGRESS NOTES
Visit Information    Have you changed or started any medications since your last visit including any over-the-counter medicines, vitamins, or herbal medicines? no   Are you having any side effects from any of your medications? -  no  Have you stopped taking any of your medications? Is so, why? -  no    Have you seen any other physician or provider since your last visit? No  Have you had any other diagnostic tests since your last visit? No  Have you been seen in the emergency room and/or had an admission to a hospital since we last saw you? No  Have you had your routine dental cleaning in the past 6 months? no    Have you activated your [employer] account? If not, what are your barriers?  Yes     Patient Care Team:  Parish Griffin MD as PCP - General (Family Medicine)  Ellery Duverney, MD as PCP - [facility]    Medical History Review  Past Medical, Family, and Social History reviewed and does not contribute to the patient presenting condition    Health Maintenance   Topic Date Due    COVID-19 Vaccine (1) Never done    Varicella vaccine (1 of 2 - 2-dose childhood series) Never done    Cervical cancer screen  Never done    DTaP/Tdap/Td vaccine (2 - Tdap) 08/13/2019    Lipids  07/02/2021    Depression Monitoring  04/29/2022    Flu vaccine (1) 09/01/2022    Hepatitis C screen  Completed    HIV screen  Completed    Hepatitis A vaccine  Aged Out    Hepatitis B vaccine  Aged Out    Hib vaccine  Aged Out    Meningococcal (ACWY) vaccine  Aged Out    Pneumococcal 0-64 years Vaccine  Aged Out

## 2022-08-30 NOTE — PATIENT INSTRUCTIONS
Thank you for letting us take care of you today. We hope all your questions were addressed. If a question was overlooked or something else comes to mind after you return home, please contact a member of your Care Team listed below. Your Care Team at [place] [address] is Team #2  Stephanie Yuan DO (Faculty)  Yaima Odonnell (Faculty)  Miguel Morin MD (Resident)  Fang Smith MD (Resident)  Leeann Santana MD (Resident)  Jarred Thrasher MD (Resident)  Junior Abdullahi., SOPHIA Alfonso.,  JOSSUE Friedman., LPN Lovenia Brunner., [facility] office)  Esther Shin, [address] (Clinical Practice Manager)  Katerina [facility] (Clinical Pharmacist)     Office phone number: [phone]    If you need to get in right away due to illness, please be advised we have \"Same Day\" appointments available Monday-Friday. Please call us at [phone] option #3 to schedule your \"Same Day\" appointment.

## 2022-08-30 NOTE — PROGRESS NOTES
Eric Barry (:  1980) is a 39 y.o. female,New patient, here for evaluation of the following chief complaint(s):  Shoulder Pain (New patient, establishing care) and Lower Back Pain (Going on for over 2 months)    ASSESSMENT/PLAN:    1. Left shoulder pain, unspecified chronicity  -     diclofenac sodium (VOLTAREN) 1 % GEL; Apply topically 2 times daily, Topical, 2 TIMES DAILY Starting 2022, Disp-350 g, R-1, Normal  -     ibuprofen (ADVIL;MOTRIN) 600 MG tablet; Take 1 tablet by mouth 2 times daily as needed for Pain, Disp-360 tablet, R-1Normal  -     acetaminophen (TYLENOL) 500 MG tablet; Take 2 tablets by mouth 4 times daily as needed for Pain, Disp-90 tablet, R-1Normal  2. Neck pain  -     diclofenac sodium (VOLTAREN) 1 % GEL; Apply topically 2 times daily, Topical, 2 TIMES DAILY Starting 2022, Disp-350 g, R-1, Normal  -     ibuprofen (ADVIL;MOTRIN) 600 MG tablet; Take 1 tablet by mouth 2 times daily as needed for Pain, Disp-360 tablet, R-1Normal  -     acetaminophen (TYLENOL) 500 MG tablet; Take 2 tablets by mouth 4 times daily as needed for Pain, Disp-90 tablet, R-1Normal  3. Chronic midline low back pain without sciatica  -     lidocaine (XYLOCAINE) 5 % ointment; Apply topically daily Apply topically as needed., Topical, DAILY Starting 2022, Disp-240 g, R-1, Normal  -     ibuprofen (ADVIL;MOTRIN) 600 MG tablet; Take 1 tablet by mouth 2 times daily as needed for Pain, Disp-360 tablet, R-1Normal  -     acetaminophen (TYLENOL) 500 MG tablet; Take 2 tablets by mouth 4 times daily as needed for Pain, Disp-90 tablet, R-1Normal  -     XR LUMBAR SPINE (MIN 4 VIEWS); Future  4. BV (bacterial vaginosis)  -     metroNIDAZOLE (FLAGYL) 500 MG tablet; Take 1 tablet by mouth 2 times daily for 7 days, Disp-14 tablet, R-0Normal  5. Encounter to establish care    Patient does complain of some continued vaginal discharge.   Was diagnosed with BV at previous ER visit early August.  Will prescribe Flagyl at this time. Return in about 4 weeks (around 9/27/2022), or if symptoms worsen or fail to improve, for Pap smear. Subjective     SUBJECTIVE/OBJECTIVE:    HPI    Ms. Tejas Bush, 69-year-old female, with previous medical history of bipolar disorder, suicide attempt, and substance use. Presents today to the [address] [place] [address] clinic today to establish care and neck and shoulder pain. Patient was seen at the ED on 8/3/2022 for abdominal pain and nausea vomiting. BV was positive for vaginitis probe however it seems that patient was not discharged on Flagyl. Neck and shoulder pain  Started about 2 months ago  Patient works as a  and is involved in chronic repetitive movement of shoulder  No history of trauma  Strength intact  Rates pain 6/10    Chronic low back pain  For several years  Has had lumbar x-ray ordered in past in 2019 by previous PCP however not completed  Pain does not radiate down legs  No urinary or bowel incontinence    Cervical cancer screen  Last Pap smear: unknown    No other acute concerns at this time    Review of Systems   Constitutional:  Negative for chills and fever. HENT:  Negative for congestion and sore throat. Respiratory:  Negative for cough and shortness of breath. Cardiovascular:  Negative for chest pain and leg swelling. Gastrointestinal:  Negative for abdominal pain, nausea and vomiting. Musculoskeletal:  Positive for back pain and neck pain. Neurological:  Negative for syncope and headaches. Psychiatric/Behavioral:  Negative for agitation, behavioral problems and confusion. Objective   Physical Exam  Vitals and nursing note reviewed. Constitutional:       General: She is not in acute distress. Appearance: Normal appearance. She is not ill-appearing. Cardiovascular:      Rate and Rhythm: Normal rate and regular rhythm. Heart sounds: No murmur heard. Pulmonary:      Effort: No respiratory distress.       Breath sounds: Normal breath sounds. No wheezing or rhonchi. Abdominal:      Palpations: Abdomen is soft. Tenderness: There is no abdominal tenderness. There is no guarding or rebound. Musculoskeletal:         General: No tenderness. Right lower leg: No edema. Left lower leg: No edema. Neurological:      Mental Status: She is alert and oriented to person, place, and time. Sensory: No sensory deficit. Motor: No weakness. On this date 8/30/2022 I have spent 30 minutes reviewing previous notes, test results and face to face with the patient discussing the diagnosis and importance of compliance with the treatment plan as well as documenting on the day of the visit. An electronic signature was used to authenticate this note.     --Normajean Castleman, MD

## 2022-08-30 NOTE — PROGRESS NOTES
Attending Physician Statement  I have discussed the care of Charles Escamilla, including pertinent history and exam findings,  with the resident. I have reviewed the key elements of all parts of the encounter with the resident. I agree with the assessment, plan and orders as documented by the resident.   (Esau Ocasio)    Adriana Montesinos MD

## 2022-08-31 ENCOUNTER — TELEPHONE (OUTPATIENT)
Dept: FAMILY MEDICINE CLINIC | Age: 42
End: 2022-08-31

## 2022-11-29 ENCOUNTER — HOSPITAL ENCOUNTER (EMERGENCY)
Age: 42
Discharge: HOME | End: 2022-11-29

## 2022-11-29 VITALS
BODY MASS INDEX: 27.44 KG/M2 | WEIGHT: 170 LBS | TEMPERATURE: 97.3 F | HEART RATE: 68 BPM | OXYGEN SATURATION: 100 % | RESPIRATION RATE: 18 BRPM

## 2022-11-29 ASSESSMENT — PAIN DESCRIPTION - DESCRIPTORS: DESCRIPTORS: DISCOMFORT;ACHING

## 2022-11-29 ASSESSMENT — PAIN DESCRIPTION - LOCATION: LOCATION: ABDOMEN

## 2022-11-29 ASSESSMENT — PAIN SCALES - GENERAL: PAINLEVEL_OUTOF10: 10

## 2022-12-06 ENCOUNTER — TELEPHONE (OUTPATIENT)
Dept: FAMILY MEDICINE CLINIC | Age: 42
End: 2022-12-06

## 2022-12-06 ENCOUNTER — APPOINTMENT (OUTPATIENT)
Dept: CT IMAGING | Age: 42
End: 2022-12-06
Payer: MEDICARE

## 2022-12-06 ENCOUNTER — HOSPITAL ENCOUNTER (EMERGENCY)
Age: 42
Discharge: HOME | End: 2022-12-06
Attending: EMERGENCY MEDICINE
Payer: MEDICARE

## 2022-12-06 VITALS
RESPIRATION RATE: 16 BRPM | HEIGHT: 66 IN | TEMPERATURE: 98.1 F | WEIGHT: 170 LBS | OXYGEN SATURATION: 100 % | DIASTOLIC BLOOD PRESSURE: 103 MMHG | HEART RATE: 95 BPM | BODY MASS INDEX: 27.32 KG/M2 | SYSTOLIC BLOOD PRESSURE: 126 MMHG

## 2022-12-06 DIAGNOSIS — R10.11 ABDOMINAL PAIN, RIGHT UPPER QUADRANT: ICD-10-CM

## 2022-12-06 DIAGNOSIS — K59.00 CONSTIPATION, UNSPECIFIED CONSTIPATION TYPE: Primary | ICD-10-CM

## 2022-12-06 LAB
ABSOLUTE EOS #: 0.4 K/UL (ref 0–0.4)
ABSOLUTE LYMPH #: 1.6 K/UL (ref 1–4.8)
ABSOLUTE MONO #: 0.5 K/UL (ref 0.1–1.3)
ALBUMIN SERPL-MCNC: 3.7 G/DL (ref 3.5–5.2)
ALP BLD-CCNC: 50 U/L (ref 35–104)
ALT SERPL-CCNC: 11 U/L (ref 5–33)
ANION GAP SERPL CALCULATED.3IONS-SCNC: 9 MMOL/L (ref 9–17)
AST SERPL-CCNC: 18 U/L
BACTERIA: NORMAL
BASOPHILS # BLD: 1 % (ref 0–2)
BASOPHILS ABSOLUTE: 0 K/UL (ref 0–0.2)
BILIRUB SERPL-MCNC: <0.2 MG/DL (ref 0.3–1.2)
BILIRUBIN URINE: NEGATIVE
BUN BLDV-MCNC: 12 MG/DL (ref 6–20)
CALCIUM SERPL-MCNC: 8.7 MG/DL (ref 8.6–10.4)
CASTS UA: NORMAL /LPF
CHLORIDE BLD-SCNC: 106 MMOL/L (ref 98–107)
CO2: 23 MMOL/L (ref 20–31)
COLOR: YELLOW
CREAT SERPL-MCNC: 0.73 MG/DL (ref 0.5–0.9)
EOSINOPHILS RELATIVE PERCENT: 6 % (ref 0–4)
EPITHELIAL CELLS UA: NORMAL /HPF
GFR SERPL CREATININE-BSD FRML MDRD: >60 ML/MIN/1.73M2
GLUCOSE BLD-MCNC: 95 MG/DL (ref 70–99)
GLUCOSE URINE: NEGATIVE
HCG(URINE) PREGNANCY TEST: NEGATIVE
HCT VFR BLD CALC: 33.5 % (ref 36–46)
HEMOGLOBIN: 11 G/DL (ref 12–16)
KETONES, URINE: NEGATIVE
LEUKOCYTE ESTERASE, URINE: NEGATIVE
LIPASE: 26 U/L (ref 13–60)
LYMPHOCYTES # BLD: 21 % (ref 24–44)
MCH RBC QN AUTO: 29.1 PG (ref 26–34)
MCHC RBC AUTO-ENTMCNC: 33 G/DL (ref 31–37)
MCV RBC AUTO: 88.2 FL (ref 80–100)
MONOCYTES # BLD: 7 % (ref 1–7)
NITRITE, URINE: NEGATIVE
PDW BLD-RTO: 13.3 % (ref 11.5–14.9)
PH UA: 7.5 (ref 5–8)
PLATELET # BLD: 281 K/UL (ref 150–450)
PMV BLD AUTO: 8.1 FL (ref 6–12)
POTASSIUM SERPL-SCNC: 3.6 MMOL/L (ref 3.7–5.3)
PROTEIN UA: NEGATIVE
RBC # BLD: 3.79 M/UL (ref 4–5.2)
RBC UA: NORMAL /HPF
SEG NEUTROPHILS: 65 % (ref 36–66)
SEGMENTED NEUTROPHILS ABSOLUTE COUNT: 5.1 K/UL (ref 1.3–9.1)
SODIUM BLD-SCNC: 138 MMOL/L (ref 135–144)
SPECIFIC GRAVITY UA: 1.02 (ref 1–1.03)
TOTAL PROTEIN: 6.7 G/DL (ref 6.4–8.3)
TURBIDITY: ABNORMAL
URINE HGB: NEGATIVE
UROBILINOGEN, URINE: NORMAL
WBC # BLD: 7.7 K/UL (ref 3.5–11)
WBC UA: NORMAL /HPF

## 2022-12-06 PROCEDURE — 6360000004 HC RX CONTRAST MEDICATION: Performed by: EMERGENCY MEDICINE

## 2022-12-06 PROCEDURE — 6360000002 HC RX W HCPCS: Performed by: STUDENT IN AN ORGANIZED HEALTH CARE EDUCATION/TRAINING PROGRAM

## 2022-12-06 PROCEDURE — 36415 COLL VENOUS BLD VENIPUNCTURE: CPT

## 2022-12-06 PROCEDURE — 96374 THER/PROPH/DIAG INJ IV PUSH: CPT

## 2022-12-06 PROCEDURE — 81001 URINALYSIS AUTO W/SCOPE: CPT

## 2022-12-06 PROCEDURE — 2580000003 HC RX 258: Performed by: EMERGENCY MEDICINE

## 2022-12-06 PROCEDURE — 85025 COMPLETE CBC W/AUTO DIFF WBC: CPT

## 2022-12-06 PROCEDURE — 6360000002 HC RX W HCPCS: Performed by: EMERGENCY MEDICINE

## 2022-12-06 PROCEDURE — 83690 ASSAY OF LIPASE: CPT

## 2022-12-06 PROCEDURE — 96361 HYDRATE IV INFUSION ADD-ON: CPT

## 2022-12-06 PROCEDURE — 96375 TX/PRO/DX INJ NEW DRUG ADDON: CPT

## 2022-12-06 PROCEDURE — 74177 CT ABD & PELVIS W/CONTRAST: CPT

## 2022-12-06 PROCEDURE — 99285 EMERGENCY DEPT VISIT HI MDM: CPT

## 2022-12-06 PROCEDURE — 80053 COMPREHEN METABOLIC PANEL: CPT

## 2022-12-06 PROCEDURE — 81025 URINE PREGNANCY TEST: CPT

## 2022-12-06 PROCEDURE — 96376 TX/PRO/DX INJ SAME DRUG ADON: CPT

## 2022-12-06 RX ORDER — 0.9 % SODIUM CHLORIDE 0.9 %
80 INTRAVENOUS SOLUTION INTRAVENOUS ONCE
Status: COMPLETED | OUTPATIENT
Start: 2022-12-06 | End: 2022-12-06

## 2022-12-06 RX ORDER — SODIUM CHLORIDE 0.9 % (FLUSH) 0.9 %
10 SYRINGE (ML) INJECTION PRN
Status: DISCONTINUED | OUTPATIENT
Start: 2022-12-06 | End: 2022-12-06 | Stop reason: HOSPADM

## 2022-12-06 RX ORDER — ONDANSETRON 2 MG/ML
4 INJECTION INTRAMUSCULAR; INTRAVENOUS ONCE
Status: COMPLETED | OUTPATIENT
Start: 2022-12-06 | End: 2022-12-06

## 2022-12-06 RX ORDER — MORPHINE SULFATE 10 MG/ML
6 INJECTION, SOLUTION INTRAMUSCULAR; INTRAVENOUS ONCE
Status: COMPLETED | OUTPATIENT
Start: 2022-12-06 | End: 2022-12-06

## 2022-12-06 RX ORDER — 0.9 % SODIUM CHLORIDE 0.9 %
1000 INTRAVENOUS SOLUTION INTRAVENOUS ONCE
Status: COMPLETED | OUTPATIENT
Start: 2022-12-06 | End: 2022-12-06

## 2022-12-06 RX ADMIN — IOPAMIDOL 75 ML: 755 INJECTION, SOLUTION INTRAVENOUS at 19:37

## 2022-12-06 RX ADMIN — SODIUM CHLORIDE 1000 ML: 9 INJECTION, SOLUTION INTRAVENOUS at 17:51

## 2022-12-06 RX ADMIN — MORPHINE SULFATE 6 MG: 10 INJECTION, SOLUTION INTRAMUSCULAR; INTRAVENOUS at 19:21

## 2022-12-06 RX ADMIN — SODIUM CHLORIDE 80 ML: 9 INJECTION, SOLUTION INTRAVENOUS at 19:37

## 2022-12-06 RX ADMIN — SODIUM CHLORIDE, PRESERVATIVE FREE 10 ML: 5 INJECTION INTRAVENOUS at 19:37

## 2022-12-06 RX ADMIN — ONDANSETRON 4 MG: 2 INJECTION INTRAMUSCULAR; INTRAVENOUS at 17:51

## 2022-12-06 RX ADMIN — MORPHINE SULFATE 6 MG: 10 INJECTION, SOLUTION INTRAMUSCULAR; INTRAVENOUS at 17:51

## 2022-12-06 ASSESSMENT — ENCOUNTER SYMPTOMS
NAUSEA: 1
VOMITING: 1
ABDOMINAL PAIN: 1
BACK PAIN: 0
SHORTNESS OF BREATH: 0
COUGH: 0

## 2022-12-06 NOTE — ED NOTES
Mode of arrival (squad #, walk in, police, etc) : Walk in         Chief complaint(s): RT side flank pain, RT rib pain         Arrival Note (brief scenario, treatment PTA, etc). : Pt states hacing intermittent rib/ flank pain on the RT side all week. Pt states the pain got increasing worse today. Pt is tearful in triage. Pt denies any urinary or bowel issues. Pt denies any injury. Pt denies shest pain, sob, nvd.         C= \"Have you ever felt that you should Cut down on your drinking? \"  No  A= \"Have people Annoyed you by criticizing your drinking? \"  No  G= \"Have you ever felt bad or Guilty about your drinking? \"  No  E= \"Have you ever had a drink as an Eye-opener first thing in the morning to steady your nerves or to help a hangover? \"  No      Deferred []      Reason for deferring: N/A    *If yes to two or more: probable alcohol abuse. Amauri Webb  12/06/22 2779

## 2022-12-06 NOTE — TELEPHONE ENCOUNTER
Patient was transferred to office from nurse triage for numbness in left hand that was moving up her arm and shoulder pain. Patient states it does not happen all the time but when it does it's very painful. Patient states that her hands go numb so bad that they feel cold to the touch. Writer did suggest patient be seen at ED or an urgent care for better assessment. Patient became upset and states \"I don't know why they transferred me to you if you can't tell me what's wrong,\" then patient hung up.

## 2022-12-06 NOTE — ED PROVIDER NOTES
[address] [facility]  EMERGENCY DEPARTMENT ENCOUNTER      Pt Name: Maisha Bran  MRN: [id number]  [place] 1980  Date of evaluation: 12/6/22      CHIEF COMPLAINT       Chief Complaint   Patient presents with    Flank Pain     HISTORY OF PRESENT ILLNESS   HPI 39 y.o. female presents with c/o abdominal pain / flank pain. Symptoms started about a week ago. Pain is described as sharp in character, severe in severity. The pain is located primarily in the RUQ with radiation to her right flank. The pain has been intermittent in course. The patient tried no medications prior to arrival for relief of symptoms. The patient denies a history of similar symptoms. REVIEW OF SYSTEMS       Review of Systems   Constitutional:  Negative for fever. HENT:  Negative for congestion. Eyes:  Negative for visual disturbance. Respiratory:  Negative for cough and shortness of breath. Cardiovascular:  Negative for chest pain. Gastrointestinal:  Positive for abdominal pain, nausea and vomiting (twice yesterday). Genitourinary:  Positive for flank pain, frequency and urgency. Negative for dysuria. Musculoskeletal:  Negative for back pain. Skin:  Negative for rash. Neurological:  Negative for headaches. Psychiatric/Behavioral:  Negative for confusion.       PAST MEDICAL HISTORY     Past Medical History:   Diagnosis Date    ADD (attention deficit disorder)     ADHD (attention deficit hyperactivity disorder)     Bipolar 1 disorder (HCC)     Bronchitis     Constipation     Depression     Diarrhea     Difficulty swallowing     Dizziness     Fibromyalgia     Fibromyalgia     Hammertoe of right foot     Headache     HLD (hyperlipidemia)     IBS (irritable bowel syndrome)     MRSA (methicillin resistant staph aureus) culture positive     Nausea & vomiting     Nervously anxious     SOB (shortness of breath)     Thyroid disorder     Wears glasses     Weight gain        SURGICAL HISTORY       Past Surgical History: Procedure Laterality Date    DILATION AND CURETTAGE OF UTERUS      EXCISION / BIOPSY SKIN LESION OF LEG Left 2017    EXCISION MEDIAL THIGH LESION performed by Dereck Ovalles IV, DO at [facility] TOE SURGERY Right 2/3/2021    TOE HAMMER REPAIR 4th & 5th TOES performed by Kane Trujillo DPM at [address]      2 cone biopsys    PRE-MALIGNANT / BENIGN SKIN LESION EXCISION Left 2017    inner thigh     TONSILLECTOMY      adenoids       CURRENT MEDICATIONS       Discharge Medication List as of 2022  8:41 PM        CONTINUE these medications which have NOT CHANGED    Details   lidocaine (XYLOCAINE) 5 % ointment Apply topically daily Apply topically as needed., Topical, DAILY Starting 2022, Disp-240 g, R-1, Normal      diclofenac sodium (VOLTAREN) 1 % GEL Apply topically 2 times daily, Topical, 2 TIMES DAILY Starting 2022, Disp-350 g, R-1, Normal      ibuprofen (ADVIL;MOTRIN) 600 MG tablet Take 1 tablet by mouth 2 times daily as needed for Pain, Disp-360 tablet, R-1Normal      acetaminophen (TYLENOL) 500 MG tablet Take 2 tablets by mouth 4 times daily as needed for Pain, Disp-90 tablet, R-1Normal      pantoprazole (PROTONIX) 20 MG tablet Take 1 tablet by mouth every morning (before breakfast), Disp-30 tablet, R-0Print      ondansetron (ZOFRAN) 4 MG tablet Take 1 tablet by mouth 3 times daily as needed for Nausea or Vomiting (Take every 8 hours as needed for nausea and vomiting. Do not take if not nauseous.), Disp-30 tablet, R-0Print      gabapentin (NEURONTIN) 300 MG capsule take 1 capsule by mouth three times a day, Disp-90 capsule, R-1Normal             ALLERGIES     is allergic to neomycin-bacitracin zn-polymyx and sulfamethoxazole-trimethoprim. FAMILY HISTORY     She indicated that her mother is alive. She indicated that her father is . She indicated that her maternal grandmother is .  She indicated that her maternal grandfather is . She indicated that her paternal grandmother is . She indicated that her paternal grandfather is . She indicated that the status of her maternal aunt is unknown. SOCIAL HISTORY      reports that she has never smoked. She has never used smokeless tobacco. She reports current alcohol use. She reports current drug use. PHYSICAL EXAM     INITIAL VITALS: BP (!) 126/103   Pulse 95   Temp 98.1 °F (36.7 °C) (Oral)   Resp 16   Ht 5' 6\" (1.676 m)   Wt 170 lb (77.1 kg)   LMP 2022   SpO2 100%   BMI 27.44 kg/m²   Gen: appears uncomfortable  Head: Normocephalic, atraumatic  Eye: Pupils equal round reactive to light, no conjunctivitis  ENT: MMM  Neck: no adenopathy JVD  Heart: Regular rate and rhythm no murmurs  Lungs: Clear to auscultation bilaterally, no respiratory distress  Chest wall: No crepitus, no tenderness palpation  Abdomen: Soft, RUQ ttp, guarding, no rebound, nondistended  MSK: No cva ttp  Neurologic: Patient is alert and oriented x3, motor and sensation is intact in all 4 extremities, fluent speech  Extremities: no rash or edema    MEDICAL DECISION MAKING:     MDM    39 y.o. female presenting with a week of right upper quadrant abdominal pain. Will rule out cholecystitis choledocholithiasis hepatitis pancreatitis obstructing kidney stone colitis. Checking laboratory studies CT scan IV fluids antiemetics and analgesics. Emergency Department course:  Laboratory studies show a normal WBC count. There is no acute anemia. There is no lipase elevation to suspect pancreatitis. Urinalysis does not appear infected. Pregnancy ruled out. CT scan shows no sign of colitis, cholecystitis, or obstructing gallstone. No perforation, or obstruction. Pt stable for discharge home and discharged. She left the emergency department before I could discuss her results warning precautions, and follow up plan with her.      DIAGNOSTIC RESULTS       RADIOLOGY:All plain film, CT, MRI, and formal ultrasound images (except ED bedside ultrasound) are read by the radiologist and the images and interpretations are directly viewed by the emergency physician. CT ABDOMEN PELVIS W IV CONTRAST Additional Contrast? None   Final Result   1. Motion limited study with no acute findings in the abdomen or pelvis. 2. Moderate stool in the colon which could reflect constipation. LABS: All lab results were reviewed by myself, and all abnormals are listed below. Labs Reviewed   URINALYSIS WITH REFLEX TO CULTURE - Abnormal; Notable for the following components:       Result Value    Turbidity UA Turbid (*)     All other components within normal limits   CBC WITH AUTO DIFFERENTIAL - Abnormal; Notable for the following components:    RBC 3.79 (*)     Hemoglobin 11.0 (*)     Hematocrit 33.5 (*)     Lymphocytes 21 (*)     Eosinophils % 6 (*)     All other components within normal limits   COMPREHENSIVE METABOLIC PANEL - Abnormal; Notable for the following components:    Potassium 3.6 (*)     Total Bilirubin <0.2 (*)     All other components within normal limits   PREGNANCY, URINE   LIPASE   MICROSCOPIC URINALYSIS       EMERGENCY DEPARTMENT COURSE:   Vitals:    Vitals:    12/06/22 1625 12/06/22 1907   BP: (!) 126/103    Pulse: 95    Resp:  16   Temp: 98.1 °F (36.7 °C)    TempSrc: Oral    SpO2: 100%    Weight: 170 lb (77.1 kg)    Height: 5' 6\" (1.676 m)        The patient was given the following medications while in the emergency department:  Orders Placed This Encounter   Medications    0.9 % sodium chloride bolus    ondansetron (ZOFRAN) injection 4 mg    morphine (PF) injection 6 mg    morphine (PF) injection 6 mg    0.9 % sodium chloride bolus    DISCONTD: sodium chloride flush 0.9 % injection 10 mL    iopamidol (ISOVUE-370) 76 % injection 75 mL     -------------------------  CRITICAL CARE:   CONSULTS: None  PROCEDURES: Procedures     FINAL IMPRESSION      1.  Constipation, unspecified constipation type    2.  Abdominal pain, right upper quadrant          DISPOSITION/PLAN   DISPOSITION Decision To Discharge 12/06/2022 08:38:22 PM      PATIENT REFERRED TO:  Shruti Snyder MD  [address] [place] [address]  [phone]    In 2 days      [facility] ED  [facility] [address] [place] [address] [address] [address]  [phone]    If symptoms worsen    DISCHARGE MEDICATIONS:  Discharge Medication List as of 12/6/2022  8:41 PM            Andrew Chávez MD  Attending Emergency Physician                      Andrew Chávez MD  12/07/22 6824

## 2023-05-15 ENCOUNTER — OFFICE VISIT (OUTPATIENT)
Dept: OBGYN CLINIC | Age: 43
End: 2023-05-15
Payer: COMMERCIAL

## 2023-05-15 VITALS
BODY MASS INDEX: 29.25 KG/M2 | HEIGHT: 66 IN | SYSTOLIC BLOOD PRESSURE: 104 MMHG | DIASTOLIC BLOOD PRESSURE: 66 MMHG | WEIGHT: 182 LBS

## 2023-05-15 DIAGNOSIS — N89.8 VAGINA ITCHING: ICD-10-CM

## 2023-05-15 DIAGNOSIS — Z11.3 SCREEN FOR STD (SEXUALLY TRANSMITTED DISEASE): ICD-10-CM

## 2023-05-15 DIAGNOSIS — R10.2 PELVIC PAIN: ICD-10-CM

## 2023-05-15 DIAGNOSIS — N76.0 VAGINAL INFECTION: Primary | ICD-10-CM

## 2023-05-15 PROCEDURE — G8417 CALC BMI ABV UP PARAM F/U: HCPCS | Performed by: NURSE PRACTITIONER

## 2023-05-15 PROCEDURE — 1036F TOBACCO NON-USER: CPT | Performed by: NURSE PRACTITIONER

## 2023-05-15 PROCEDURE — 99213 OFFICE O/P EST LOW 20 MIN: CPT | Performed by: NURSE PRACTITIONER

## 2023-05-15 PROCEDURE — G8427 DOCREV CUR MEDS BY ELIG CLIN: HCPCS | Performed by: NURSE PRACTITIONER

## 2023-05-15 NOTE — PROGRESS NOTES
Sulfamethoxazole-trimethoprim  12/29/2016         REVIEW OF SYSTEMS:    see problem list    A minimum of an eleven point review of systems was completed. Review Of Systems (11 point):  Constitutional: No fever, chills or malaise; No weight change or fatigue  Head and Eyes: No vision, Headache, Dizziness or trauma in last 12 months  ENT ROS: No hearing, Tinnitis, sinus or taste problems  Hematological and Lymphatic ROS:No Lymphoma, Von Willebrand's, Hemophillia or Bleeding History  Psych ROS: No Depression, Homicidal thoughts,suicidal thoughts, or anxiety  Breast ROS: No prior breast abnormalities or lumps  Respiratory ROS: No SOB, Pneumoniae,Cough, or Pulmonary Embolism History  Cardiovascular ROS: No Chest Pain with Exertion, Palpitations, Syncope, Edema, Arrhythmia  Gastrointestinal ROS: No Indigestion, Heartburn, Nausea, vomiting, Diarrhea, Constipation,or Bowel Changes; No Bloody Stools or melena  Genito-Urinary ROS: No Dysuria, Hematuria or Nocturia. No Urinary Incontinence or Vaginal Discharge  Musculoskeletal ROS: No Arthralgia, Arthritis,Gout,Osteoporosis or Rheumatism  Neurological ROS: No CVA, Migraines, Epilepsy, Seizure Hx, or Limb Weakness  Dermatological ROS: No Rash, Itching, Hives, Mole Changes or Cancer          Blood pressure 104/66, height 5' 6\" (1.676 m), weight 182 lb (82.6 kg), last menstrual period 05/01/2023, not currently breastfeeding. Chaperone for Intimate Exam  Chaperone was offered and accepted as part of the rooming process. Chaperone: Emelina MARCUM         Abdomen: Soft non-tender; good bowel sounds. No guarding, rebound or rigidity. No CVA tenderness bilaterally. Extremities: No calf tenderness, DTR 2/4, and No edema bilaterally    Pelvic: Vulva and vagina appear normal. Bimanual exam reveals normal uterus and adnexa. Diagnostics:  No results found.     Lab Results:  Results for orders placed or performed during the hospital encounter of 12/06/22   Urinalysis with Reflex to

## 2023-05-16 ENCOUNTER — TELEPHONE (OUTPATIENT)
Dept: OBGYN CLINIC | Age: 43
End: 2023-05-16

## 2023-05-16 ENCOUNTER — HOSPITAL ENCOUNTER (OUTPATIENT)
Age: 43
Setting detail: SPECIMEN
Discharge: HOME | End: 2023-05-16

## 2023-05-16 DIAGNOSIS — N76.0 VAGINAL INFECTION: ICD-10-CM

## 2023-05-16 PROBLEM — A74.9 CHLAMYDIA: Status: ACTIVE | Noted: 2023-05-16

## 2023-05-16 LAB
C TRACH DNA SPEC QL PROBE+SIG AMP: ABNORMAL
CANDIDA SPECIES, DNA PROBE: NEGATIVE
GARDNERELLA VAGINALIS, DNA PROBE: POSITIVE
N GONORRHOEA DNA SPEC QL PROBE+SIG AMP: NEGATIVE
SOURCE: ABNORMAL
SPECIMEN DESCRIPTION: ABNORMAL
TRICHOMONAS VAGINALIS DNA: NEGATIVE

## 2023-05-16 RX ORDER — DOXYCYCLINE HYCLATE 100 MG
100 TABLET ORAL 2 TIMES DAILY
Qty: 14 TABLET | Refills: 0 | Status: SHIPPED | OUTPATIENT
Start: 2023-05-16 | End: 2023-05-23

## 2023-05-16 RX ORDER — METRONIDAZOLE 500 MG/1
500 TABLET ORAL 2 TIMES DAILY
Qty: 14 TABLET | Refills: 0 | Status: SHIPPED | OUTPATIENT
Start: 2023-05-16 | End: 2023-05-23

## 2023-05-16 NOTE — TELEPHONE ENCOUNTER
Per Radha Levels NP, pt notified of +BV; RX for flagyl to be sent to pt pharmacy. Pt verbalized understanding.

## 2023-05-16 NOTE — TELEPHONE ENCOUNTER
----- Message from MATT Ennis CNP sent at 5/16/2023  3:03 PM EDT -----  + chlamydia  Doxycycline 100mg PO BID x 7 days  Abstain  Partner needs treated  Test of cure in 2-3 weeks.

## 2023-05-16 NOTE — TELEPHONE ENCOUNTER
Patient notified of results and recommendations, script for doxycycline to be sent to [facility]. Patient instructed to abstain from intercourse, partner needs to be treated, repeat cultures in 2-3 weeks.

## 2023-05-16 NOTE — TELEPHONE ENCOUNTER
----- Message from MATT Pelaez - CNP sent at 5/16/2023  9:09 AM EDT -----  +BV- flagyl 500mg PO BID x7 days

## 2023-06-19 ENCOUNTER — HOSPITAL ENCOUNTER (OUTPATIENT)
Dept: GENERAL RADIOLOGY | Age: 43
Discharge: HOME | End: 2023-06-21
Payer: COMMERCIAL

## 2023-06-19 ENCOUNTER — HOSPITAL ENCOUNTER (OUTPATIENT)
Dept: ULTRASOUND IMAGING | Age: 43
Discharge: HOME | End: 2023-06-21
Payer: COMMERCIAL

## 2023-06-19 ENCOUNTER — HOSPITAL ENCOUNTER (OUTPATIENT)
Age: 43
Setting detail: SPECIMEN
Discharge: HOME | End: 2023-06-19
Payer: COMMERCIAL

## 2023-06-19 ENCOUNTER — HOSPITAL ENCOUNTER (OUTPATIENT)
Age: 43
Discharge: HOME | End: 2023-06-21
Payer: COMMERCIAL

## 2023-06-19 ENCOUNTER — OFFICE VISIT (OUTPATIENT)
Dept: OBGYN CLINIC | Age: 43
End: 2023-06-19
Payer: COMMERCIAL

## 2023-06-19 VITALS
BODY MASS INDEX: 30.53 KG/M2 | SYSTOLIC BLOOD PRESSURE: 114 MMHG | WEIGHT: 190 LBS | HEIGHT: 66 IN | DIASTOLIC BLOOD PRESSURE: 64 MMHG

## 2023-06-19 DIAGNOSIS — Z20.2 CHLAMYDIA CONTACT, TREATED: ICD-10-CM

## 2023-06-19 DIAGNOSIS — Z11.51 SPECIAL SCREENING EXAMINATION FOR HUMAN PAPILLOMAVIRUS (HPV): ICD-10-CM

## 2023-06-19 DIAGNOSIS — Z12.31 ENCOUNTER FOR SCREENING MAMMOGRAM FOR MALIGNANT NEOPLASM OF BREAST: ICD-10-CM

## 2023-06-19 DIAGNOSIS — M54.50 CHRONIC MIDLINE LOW BACK PAIN WITHOUT SCIATICA: ICD-10-CM

## 2023-06-19 DIAGNOSIS — G89.29 CHRONIC MIDLINE LOW BACK PAIN WITHOUT SCIATICA: ICD-10-CM

## 2023-06-19 DIAGNOSIS — R53.83 OTHER FATIGUE: ICD-10-CM

## 2023-06-19 DIAGNOSIS — R10.2 PELVIC PAIN: ICD-10-CM

## 2023-06-19 DIAGNOSIS — Z01.419 WELL FEMALE EXAM WITH ROUTINE GYNECOLOGICAL EXAM: Primary | ICD-10-CM

## 2023-06-19 DIAGNOSIS — Z11.3 SCREEN FOR STD (SEXUALLY TRANSMITTED DISEASE): ICD-10-CM

## 2023-06-19 LAB
BILIRUB UR QL STRIP: NEGATIVE
CLARITY UR: CLEAR
COLOR UR: YELLOW
COMMENT UA: ABNORMAL
GLUCOSE UR STRIP-MCNC: NEGATIVE MG/DL
HBV SURFACE AG SERPL QL IA: NONREACTIVE
HCV AB SERPL QL IA: NONREACTIVE
HGB UR QL STRIP.AUTO: NEGATIVE
KETONES UR STRIP-MCNC: ABNORMAL MG/DL
LEUKOCYTE ESTERASE UR QL STRIP: NEGATIVE
NITRITE UR QL STRIP: NEGATIVE
PH UR STRIP: 5.5 [PH] (ref 5–8)
PROT UR STRIP-MCNC: NEGATIVE MG/DL
SP GR UR STRIP: 1.02 (ref 1–1.03)
UROBILINOGEN UR STRIP-ACNC: NORMAL

## 2023-06-19 PROCEDURE — 87624 HPV HI-RISK TYP POOLED RSLT: CPT

## 2023-06-19 PROCEDURE — 86694 HERPES SIMPLEX NES ANTBDY: CPT

## 2023-06-19 PROCEDURE — 86803 HEPATITIS C AB TEST: CPT

## 2023-06-19 PROCEDURE — 86695 HERPES SIMPLEX TYPE 1 TEST: CPT

## 2023-06-19 PROCEDURE — 72110 X-RAY EXAM L-2 SPINE 4/>VWS: CPT

## 2023-06-19 PROCEDURE — 76830 TRANSVAGINAL US NON-OB: CPT

## 2023-06-19 PROCEDURE — G0145 SCR C/V CYTO,THINLAYER,RESCR: HCPCS

## 2023-06-19 PROCEDURE — 99396 PREV VISIT EST AGE 40-64: CPT | Performed by: NURSE PRACTITIONER

## 2023-06-19 PROCEDURE — 87389 HIV-1 AG W/HIV-1&-2 AB AG IA: CPT

## 2023-06-19 PROCEDURE — 86696 HERPES SIMPLEX TYPE 2 TEST: CPT

## 2023-06-19 PROCEDURE — 36415 COLL VENOUS BLD VENIPUNCTURE: CPT

## 2023-06-19 PROCEDURE — 81003 URINALYSIS AUTO W/O SCOPE: CPT

## 2023-06-19 PROCEDURE — 76856 US EXAM PELVIC COMPLETE: CPT

## 2023-06-19 PROCEDURE — 87340 HEPATITIS B SURFACE AG IA: CPT

## 2023-06-19 PROCEDURE — G0123 SCREEN CERV/VAG THIN LAYER: HCPCS

## 2023-06-19 NOTE — PROGRESS NOTES
History and Physical  [address], [address] [facility] [address] [phone]   Fax [phone]  Barron Cruz  2023              43 y.o. Chief Complaint   Patient presents with    Annual Exam       Patient's last menstrual period was 2023 (approximate). Primary Care Physician: Raymond Hooper MD    The patient was seen and examined. She has no chief complaint today and is here for her annual exam.  Her bowels are regular. There are no voiding complaints. She denies any bloating. She denies vaginal discharge and was counseled on STD's and the need for barrier contraception.      HPI : Barron Cruz is a 43 y.o. female [id number]    Annual exam  RAYNE for + CT   C/o fatigue the week prior to menses  Declined hormonal control of menses    no Bloating  no Early Satiety  no Unexplained weight change of more than 15 lbs  no  PMB  no  PCB  ________________________________________________________________________  OB History    Para Term  AB Living   4 2 2 0 2 2   SAB IAB Ectopic Molar Multiple Live Births   1 0 0 0 0 2      # Outcome Date GA Lbr Chauncey/2nd Weight Sex Delivery Anes PTL Lv   4 AB 2005           3 SAB            2 Term  40w0d  7 lb 9 oz (3.43 kg) F Vag-Spont   CASSIDY   1 Term  40w0d  6 lb 14 oz (3.118 kg) F Vag-Spont   CASSIDY     Past Medical History:   Diagnosis Date    ADD (attention deficit disorder)     ADHD (attention deficit hyperactivity disorder)     Bipolar 1 disorder (HCC)     Bronchitis     Constipation     Depression     Diarrhea     Difficulty swallowing     Dizziness     Fibromyalgia     Fibromyalgia     Hammertoe of right foot     Headache     HLD (hyperlipidemia)     IBS (irritable bowel syndrome)     MRSA (methicillin resistant staph aureus) culture positive     Nausea & vomiting     Nervously anxious     SOB (shortness of breath)     Thyroid disorder     Wears glasses     Weight gain

## 2023-06-20 ENCOUNTER — TELEPHONE (OUTPATIENT)
Dept: OBGYN CLINIC | Age: 43
End: 2023-06-20

## 2023-06-20 PROBLEM — R89.4 HERPES SIMPLEX ANTIBODY POSITIVE: Status: ACTIVE | Noted: 2023-06-20

## 2023-06-20 LAB
HERPES SIMPLEX VIRUS 2 IGG: 5.08
HERPES TYPE 1/2 IGM COMBINED: 0.57
HIV 1+2 AB+HIV1 P24 AG SERPL QL IA: NONREACTIVE
HSV1 IGG SERPL QL IA: 1.08

## 2023-06-20 NOTE — TELEPHONE ENCOUNTER
LM for pt to contact office regarding results.      Herpes type II positive, type I equivocal.  If patient having symptoms and first outbreak, treat with valtrex 1 gram PO BID x 10 days    +BV- flagyl 500mg Po bID x7 days    Normal pelvic ultrasound

## 2023-06-20 NOTE — TELEPHONE ENCOUNTER
----- Message from MATT Cervantes - CNP sent at 6/20/2023  1:54 PM EDT -----  Herpes type II positive, type I equivocal.  If patient having symptoms and first outbreak, treat with valtrex 1 gram PO BID x 10 days

## 2023-06-22 ENCOUNTER — TELEPHONE (OUTPATIENT)
Dept: OBGYN CLINIC | Age: 43
End: 2023-06-22

## 2023-06-22 LAB — CYTOLOGY REPORT: NORMAL

## 2023-06-22 RX ORDER — METRONIDAZOLE 500 MG/1
500 TABLET ORAL 2 TIMES DAILY
Qty: 14 TABLET | Refills: 0 | Status: SHIPPED | OUTPATIENT
Start: 2023-06-22 | End: 2023-06-29

## 2023-06-22 RX ORDER — VALACYCLOVIR HYDROCHLORIDE 1 G/1
2000 TABLET, FILM COATED ORAL 2 TIMES DAILY
Qty: 20 TABLET | Refills: 0 | Status: SHIPPED | OUTPATIENT
Start: 2023-06-22 | End: 2023-07-02

## 2023-06-22 NOTE — TELEPHONE ENCOUNTER
----- Message from MATT Butler CNP sent at 6/20/2023  1:54 PM EDT -----  Herpes type II positive, type I equivocal.  If patient having symptoms and first outbreak, treat with valtrex 1 gram PO BID x 10 days

## 2023-06-22 NOTE — TELEPHONE ENCOUNTER
Per Armand Staley pt notified of +BV- flagyl 500mg Po bID x7 days and Herpes type II positive, type I equivocal.  If patient having symptoms and first outbreak, treat with valtrex 1 gram PO BID x 10 days/ pt with hx HSV 2 and is requesting Valtrex 500mg.

## 2023-06-23 LAB
HPV SAMPLE: NORMAL
HPV, GENOTYPE 16: NOT DETECTED
HPV, GENOTYPE 18: NOT DETECTED
HPV, HIGH RISK OTHER: NOT DETECTED
HPV, INTERPRETATION: NORMAL
SPECIMEN DESCRIPTION: NORMAL

## 2023-08-19 NOTE — BRIEF OP NOTE
PODIATRY BRIEF OP NOTE    PATIENT NAME: Zack Tavares  YOB: 1980  -  36 y.o. female  MRN: [id number]  DATE: 2/3/2021  BILLING #: [id number]    Surgeon(s):  Jonn Gandara DPM     ASSISTANTS: Ramya Garcia DPM PGY2, Joana Vigil    PRE-OP DIAGNOSIS:   1. Hammertoe deformity 4th digit, right foot  2. Hammertoe deformity 5th digit, right foot    POST-OP DIAGNOSIS: Same as above. PROCEDURE:   1. Flexor tendon tenotomy of 4th digit, right foot  2. Flexor tendon tenotomy of 5th digit, right foot  3. Derotational arthroplasty of 5th PIPJ, right foot    ANESTHESIA: MAC with local (10 cc of 0.5% marcaine plain)    HEMOSTASIS: Pneumatic ankle right tourniquet @ 250 mmHg for 22 minutes. ESTIMATED BLOOD LOSS: Less than 5cc. MATERIALS: 4-0 nylon  * No implants in log *    INJECTABLES: 5 cc 1% lidocaine plain    SPECIMEN: none  * No specimens in log *    COMPLICATIONS: none    FINDINGS: 4th and 5th digits now in rectus position. Deformity reduced    The patient was counseled at length about the risks of idalia Covid-19 during their perioperative period and any recovery window from their procedure. The patient was made aware that idalia Covid-19  may worsen their prognosis for recovering from their procedure  and lend to a higher morbidity and/or mortality risk. All material risks, benefits, and reasonable alternatives including postponing the procedure were discussed. The patient does wish to proceed with the procedure at this time.     Ramya Garcia DPM   Podiatric Medicine & Surgery   2/3/2021 at 8:11 AM clear cerebral spinal fluid

## 2024-01-22 ENCOUNTER — HOSPITAL ENCOUNTER (EMERGENCY)
Age: 44
Discharge: HOME | End: 2024-01-23
Attending: EMERGENCY MEDICINE
Payer: COMMERCIAL

## 2024-01-22 VITALS
DIASTOLIC BLOOD PRESSURE: 87 MMHG | TEMPERATURE: 99.2 F | HEART RATE: 100 BPM | SYSTOLIC BLOOD PRESSURE: 130 MMHG | RESPIRATION RATE: 16 BRPM | OXYGEN SATURATION: 98 %
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
## 2024-01-22 PROCEDURE — 81025 URINE PREGNANCY TEST: CPT

## 2024-01-22 PROCEDURE — 6360000002 HC RX W HCPCS: Performed by: STUDENT IN AN ORGANIZED HEALTH CARE EDUCATION/TRAINING PROGRAM

## 2024-01-22 PROCEDURE — 96372 THER/PROPH/DIAG INJ SC/IM: CPT

## 2024-01-22 PROCEDURE — 99284 EMERGENCY DEPT VISIT MOD MDM: CPT

## 2024-01-22 RX ORDER — KETOROLAC TROMETHAMINE 15 MG/ML
15 INJECTION, SOLUTION INTRAMUSCULAR; INTRAVENOUS ONCE
Status: COMPLETED | OUTPATIENT
Start: 2024-01-22 | End: 2024-01-22

## 2024-01-22 RX ADMIN — KETOROLAC TROMETHAMINE 15 MG: 15 INJECTION, SOLUTION INTRAMUSCULAR; INTRAVENOUS at 23:29

## 2024-01-22 ASSESSMENT — PAIN DESCRIPTION - DESCRIPTORS: DESCRIPTORS: ACHING;SORE;DISCOMFORT

## 2024-01-22 ASSESSMENT — PAIN SCALES - GENERAL: PAINLEVEL_OUTOF10: 8

## 2024-01-22 ASSESSMENT — PAIN - FUNCTIONAL ASSESSMENT: PAIN_FUNCTIONAL_ASSESSMENT: 0-10

## 2024-01-22 ASSESSMENT — PAIN DESCRIPTION - ORIENTATION: ORIENTATION: RIGHT

## 2024-01-22 ASSESSMENT — PAIN DESCRIPTION - LOCATION: LOCATION: BUTTOCKS;ELBOW

## 2024-01-23 ENCOUNTER — APPOINTMENT (OUTPATIENT)
Dept: GENERAL RADIOLOGY | Age: 44
End: 2024-01-23
Payer: COMMERCIAL

## 2024-01-23 LAB — HCG UR QL: NEGATIVE

## 2024-01-23 PROCEDURE — 72100 X-RAY EXAM L-S SPINE 2/3 VWS: CPT

## 2024-01-23 PROCEDURE — 72170 X-RAY EXAM OF PELVIS: CPT

## 2024-01-23 PROCEDURE — 73130 X-RAY EXAM OF HAND: CPT

## 2024-01-23 PROCEDURE — 73110 X-RAY EXAM OF WRIST: CPT

## 2024-01-23 PROCEDURE — 71046 X-RAY EXAM CHEST 2 VIEWS: CPT

## 2024-01-23 PROCEDURE — 73080 X-RAY EXAM OF ELBOW: CPT

## 2024-01-23 RX ORDER — ACETAMINOPHEN 500 MG
1000 TABLET ORAL EVERY 6 HOURS PRN
Qty: 12 TABLET | Refills: 0 | Status: SHIPPED | OUTPATIENT
Start: 2024-01-23

## 2024-01-23 RX ORDER — IBUPROFEN 600 MG/1
600 TABLET ORAL EVERY 8 HOURS PRN
Qty: 8 TABLET | Refills: 0 | Status: SHIPPED | OUTPATIENT
Start: 2024-01-23

## 2024-01-23 ASSESSMENT — ENCOUNTER SYMPTOMS
SHORTNESS OF BREATH: 0
ABDOMINAL PAIN: 0
PHOTOPHOBIA: 0
NAUSEA: 0
VOMITING: 0
SINUS PRESSURE: 0
SINUS PAIN: 0
FACIAL SWELLING: 0
WHEEZING: 0
BACK PAIN: 1
CHEST TIGHTNESS: 0
DIARRHEA: 0

## 2024-01-23 NOTE — DISCHARGE INSTRUCTIONS
Seen in the emergency department for a fall on ice.  X-ray images were unremarkable.  No acute fractures.  Medically stable to be discharged home.  Given prescriptions for Tylenol and Motrin for pain control.  If you begin experiencing worsened pain, skin changes, episodes of passing out, or any other concerning symptoms then represent to the emergency department for reevaluation.

## 2024-01-23 NOTE — ED PROVIDER NOTES
[facility]     Emergency Department     Faculty Attestation    I performed a history and physical examination of the patient and discussed management with the resident. I have reviewed and agree with the resident’s findings including all diagnostic interpretations, and treatment plans as written. Any areas of disagreement are noted on the chart. I was personally present for the key portions of any procedures. I have documented in the chart those procedures where I was not present during the key portions. I have reviewed the emergency nurses triage note. I agree with the chief complaint, past medical history, past surgical history, allergies, medications, social and family history as documented unless otherwise noted below. Documentation of the HPI, Physical Exam and Medical Decision Making performed by thania is based on my personal performance of the HPI, PE and MDM. For Physician Assistant/ Nurse Practitioner cases/documentation I have personally evaluated this patient and have completed at least one if not all key elements of the E/M (history, physical exam, and MDM). Additional findings are as noted.    Note Started: 11:22 PM EST     44 yo F c/o diffuse back pain s/p mechanical fall, no head or neck injury, no loc, c/o R elbow / R wrist pain, no chest or abdominal pain,   PE Adi RN Escort for exam: vss gcs 15 edith, no cervical tenderness, crepitus, or deformity, diffuse thoracic and lumbar paraspinal tenderness, no midline thoracic, or lumbar spinal tenderness, crepitus, or deformity, mild tenderness right posterior elbow, without deformity, chronic deformity noted right wrist, neurovascularly intact right upper extremity, without abrasion, compartments are soft, ambulatory with steady even gait,     Xr -, no indication of serious injury, talkative, ambulatory, vss    EKG Interpretation    Interpreted by me      CRITICAL CARE: There was a high 
gain.       has a past surgical history that includes other surgical history; Dilation and curettage of uterus; Tonsillectomy; pre-malignant / benign skin lesion excision (Left, 02/24/2017); EXCISION / BIOPSY SKIN LESION OF LEG (Left, 2/24/2017); and Hammer toe surgery (Right, 2/3/2021).      Social History     Socioeconomic History    Marital status:      Spouse name: Not on file    Number of children: Not on file    Years of education: Not on file    Highest education level: Not on file   Occupational History    Not on file   Tobacco Use    Smoking status: Never    Smokeless tobacco: Never    Tobacco comments:     Marijuana only never tobacco   Vaping Use    Vaping Use: Never used   Substance and Sexual Activity    Alcohol use: Yes     Comment: Occasional    Drug use: Yes     Comment: marijuana daily last time 2/2/21    Sexual activity: Yes     Partners: Male   Other Topics Concern    Not on file   Social History Narrative    Not on file     Social Determinants of Health     Financial Resource Strain: Low Risk  (8/30/2022)    Overall Financial Resource Strain (CARDIA)     Difficulty of Paying Living Expenses: Not hard at all   Food Insecurity: No Food Insecurity (8/30/2022)    Hunger Vital Sign     Worried About Running Out of Food in the Last Year: Never true     Ran Out of Food in the Last Year: Never true   Transportation Needs: Not on file   Physical Activity: Not on file   Stress: Not on file   Social Connections: Not on file   Intimate Partner Violence: Not on file   Housing Stability: Not on file       Family History   Problem Relation Age of Onset    Hypertension Mother     Diabetes Father     Brain Cancer Maternal Grandmother     Heart Defect Maternal Grandfather     Stroke Maternal Aunt        Allergies:  Neomycin-bacitracin zn-polymyx and Sulfamethoxazole-trimethoprim    Home Medications:  Prior to Admission medications    Medication Sig Start Date End Date Taking? Authorizing Provider

## 2024-01-23 NOTE — ED NOTES
Patient presents to the ED with c/o fall. Patient c/o right wrist pain secondary to slipping and falling on ice an hour ago. Patient c/o 8/10 pain. Patient reports hx of right wrist problems in the past. Patient is alert and oriented x4, answering questions appropriately.

## 2024-01-23 NOTE — ED NOTES
MAGGI called [facility] shelter per pt request to notify them that she is on her way and was still in ED at this time. MAGGI spoke to Macie.

## 2024-01-23 NOTE — ED NOTES
MAGGI booked cab for discharged back to [facility] using [employer]. Trip # [id number]. MAGGI received call from staff to assist.

## 2024-05-30 ENCOUNTER — HOSPITAL ENCOUNTER (EMERGENCY)
Age: 44
Discharge: HOME | End: 2024-05-30
Attending: EMERGENCY MEDICINE
Payer: COMMERCIAL

## 2024-05-30 VITALS
DIASTOLIC BLOOD PRESSURE: 82 MMHG | WEIGHT: 198.41 LBS | TEMPERATURE: 97.9 F | OXYGEN SATURATION: 100 % | BODY MASS INDEX: 32.02 KG/M2 | RESPIRATION RATE: 18 BRPM | SYSTOLIC BLOOD PRESSURE: 131 MMHG | HEART RATE: 80 BPM

## 2024-05-30 DIAGNOSIS — L02.211 ABSCESS OF SKIN OF ABDOMEN: Primary | ICD-10-CM

## 2024-05-30 DIAGNOSIS — L03.311 CELLULITIS OF ABDOMINAL WALL: ICD-10-CM

## 2024-05-30 LAB
ALBUMIN SERPL-MCNC: 3.8 G/DL (ref 3.5–5.2)
ALBUMIN/GLOB SERPL: 1 {RATIO} (ref 1–2.5)
ALP SERPL-CCNC: 55 U/L (ref 35–104)
ALT SERPL-CCNC: 12 U/L (ref 10–35)
ANION GAP SERPL CALCULATED.3IONS-SCNC: 8 MMOL/L (ref 9–16)
AST SERPL-CCNC: 21 U/L (ref 10–35)
BASOPHILS # BLD: 0.03 K/UL (ref 0–0.2)
BASOPHILS NFR BLD: 1 % (ref 0–2)
BILIRUB SERPL-MCNC: <0.2 MG/DL (ref 0–1.2)
BUN SERPL-MCNC: 14 MG/DL (ref 6–20)
CALCIUM SERPL-MCNC: 8.6 MG/DL (ref 8.6–10.4)
CHLORIDE SERPL-SCNC: 107 MMOL/L (ref 98–107)
CO2 SERPL-SCNC: 26 MMOL/L (ref 20–31)
CREAT SERPL-MCNC: 0.7 MG/DL (ref 0.5–0.9)
EOSINOPHIL # BLD: 0.44 K/UL (ref 0–0.44)
EOSINOPHILS RELATIVE PERCENT: 7 % (ref 1–4)
ERYTHROCYTE [DISTWIDTH] IN BLOOD BY AUTOMATED COUNT: 12.4 % (ref 11.8–14.4)
GFR, ESTIMATED: >90 ML/MIN/1.73M2
GLUCOSE SERPL-MCNC: 100 MG/DL (ref 74–99)
HCG SERPL QL: NEGATIVE
HCT VFR BLD AUTO: 38.2 % (ref 36.3–47.1)
HGB BLD-MCNC: 12.2 G/DL (ref 11.9–15.1)
IMM GRANULOCYTES # BLD AUTO: <0.03 K/UL (ref 0–0.3)
IMM GRANULOCYTES NFR BLD: 0 %
LACTIC ACID, WHOLE BLOOD: 1.2 MMOL/L (ref 0.7–2.1)
LYMPHOCYTES NFR BLD: 1.42 K/UL (ref 1.1–3.7)
LYMPHOCYTES RELATIVE PERCENT: 23 % (ref 24–43)
MCH RBC QN AUTO: 29.3 PG (ref 25.2–33.5)
MCHC RBC AUTO-ENTMCNC: 31.9 G/DL (ref 28.4–34.8)
MCV RBC AUTO: 91.8 FL (ref 82.6–102.9)
MONOCYTES NFR BLD: 0.42 K/UL (ref 0.1–1.2)
MONOCYTES NFR BLD: 7 % (ref 3–12)
NEUTROPHILS NFR BLD: 62 % (ref 36–65)
NEUTS SEG NFR BLD: 3.86 K/UL (ref 1.5–8.1)
NRBC BLD-RTO: 0 PER 100 WBC
PLATELET # BLD AUTO: 232 K/UL (ref 138–453)
PMV BLD AUTO: 9.2 FL (ref 8.1–13.5)
POTASSIUM SERPL-SCNC: 4.3 MMOL/L (ref 3.7–5.3)
PROT SERPL-MCNC: 6.4 G/DL (ref 6.6–8.7)
RBC # BLD AUTO: 4.16 M/UL (ref 3.95–5.11)
SODIUM SERPL-SCNC: 141 MMOL/L (ref 136–145)
WBC OTHER # BLD: 6.2 K/UL (ref 3.5–11.3)

## 2024-05-30 PROCEDURE — 2580000003 HC RX 258: Performed by: STUDENT IN AN ORGANIZED HEALTH CARE EDUCATION/TRAINING PROGRAM

## 2024-05-30 PROCEDURE — 6360000002 HC RX W HCPCS: Performed by: STUDENT IN AN ORGANIZED HEALTH CARE EDUCATION/TRAINING PROGRAM

## 2024-05-30 PROCEDURE — 96375 TX/PRO/DX INJ NEW DRUG ADDON: CPT

## 2024-05-30 PROCEDURE — 84703 CHORIONIC GONADOTROPIN ASSAY: CPT

## 2024-05-30 PROCEDURE — 6370000000 HC RX 637 (ALT 250 FOR IP): Performed by: STUDENT IN AN ORGANIZED HEALTH CARE EDUCATION/TRAINING PROGRAM

## 2024-05-30 PROCEDURE — 85025 COMPLETE CBC W/AUTO DIFF WBC: CPT

## 2024-05-30 PROCEDURE — 80053 COMPREHEN METABOLIC PANEL: CPT

## 2024-05-30 PROCEDURE — 96374 THER/PROPH/DIAG INJ IV PUSH: CPT

## 2024-05-30 PROCEDURE — 87040 BLOOD CULTURE FOR BACTERIA: CPT

## 2024-05-30 PROCEDURE — 83605 ASSAY OF LACTIC ACID: CPT

## 2024-05-30 PROCEDURE — 99284 EMERGENCY DEPT VISIT MOD MDM: CPT

## 2024-05-30 RX ORDER — OXYCODONE HYDROCHLORIDE 5 MG/1
5 TABLET ORAL ONCE
Status: COMPLETED | OUTPATIENT
Start: 2024-05-30 | End: 2024-05-30

## 2024-05-30 RX ORDER — DOXYCYCLINE HYCLATE 100 MG
100 TABLET ORAL ONCE
Status: COMPLETED | OUTPATIENT
Start: 2024-05-30 | End: 2024-05-30

## 2024-05-30 RX ORDER — ACETAMINOPHEN 500 MG
1000 TABLET ORAL
Status: COMPLETED | OUTPATIENT
Start: 2024-05-30 | End: 2024-05-30

## 2024-05-30 RX ORDER — DOXYCYCLINE HYCLATE 100 MG
100 TABLET ORAL 2 TIMES DAILY
Qty: 14 TABLET | Refills: 0 | Status: SHIPPED | OUTPATIENT
Start: 2024-05-30 | End: 2024-06-06

## 2024-05-30 RX ORDER — ACETAMINOPHEN 500 MG
1000 TABLET ORAL EVERY 6 HOURS PRN
Qty: 30 TABLET | Refills: 0 | Status: SHIPPED | OUTPATIENT
Start: 2024-05-30

## 2024-05-30 RX ORDER — IBUPROFEN 800 MG/1
800 TABLET ORAL EVERY 8 HOURS PRN
Qty: 60 TABLET | Refills: 0 | Status: SHIPPED | OUTPATIENT
Start: 2024-05-30

## 2024-05-30 RX ORDER — MORPHINE SULFATE 4 MG/ML
4 INJECTION INTRAVENOUS
Status: COMPLETED | OUTPATIENT
Start: 2024-05-30 | End: 2024-05-30

## 2024-05-30 RX ORDER — ONDANSETRON 2 MG/ML
4 INJECTION INTRAMUSCULAR; INTRAVENOUS ONCE
Status: COMPLETED | OUTPATIENT
Start: 2024-05-30 | End: 2024-05-30

## 2024-05-30 RX ORDER — 0.9 % SODIUM CHLORIDE 0.9 %
1000 INTRAVENOUS SOLUTION INTRAVENOUS ONCE
Status: COMPLETED | OUTPATIENT
Start: 2024-05-30 | End: 2024-05-30

## 2024-05-30 RX ORDER — ONDANSETRON 4 MG/1
4 TABLET, ORALLY DISINTEGRATING ORAL 3 TIMES DAILY PRN
Qty: 3 TABLET | Refills: 0 | Status: SHIPPED | OUTPATIENT
Start: 2024-05-30

## 2024-05-30 RX ADMIN — ONDANSETRON 4 MG: 2 INJECTION INTRAMUSCULAR; INTRAVENOUS at 10:43

## 2024-05-30 RX ADMIN — MORPHINE SULFATE 4 MG: 4 INJECTION INTRAVENOUS at 10:43

## 2024-05-30 RX ADMIN — DOXYCYCLINE HYCLATE 100 MG: 100 TABLET, COATED ORAL at 11:14

## 2024-05-30 RX ADMIN — ACETAMINOPHEN 1000 MG: 500 TABLET ORAL at 11:14

## 2024-05-30 RX ADMIN — SODIUM CHLORIDE 1000 ML: 9 INJECTION, SOLUTION INTRAVENOUS at 10:43

## 2024-05-30 RX ADMIN — OXYCODONE 5 MG: 5 TABLET ORAL at 12:51

## 2024-05-30 ASSESSMENT — PAIN SCALES - GENERAL: PAINLEVEL_OUTOF10: 8

## 2024-05-30 ASSESSMENT — PAIN DESCRIPTION - PAIN TYPE: TYPE: ACUTE PAIN

## 2024-05-30 ASSESSMENT — PAIN - FUNCTIONAL ASSESSMENT: PAIN_FUNCTIONAL_ASSESSMENT: 0-10

## 2024-05-30 ASSESSMENT — PAIN DESCRIPTION - FREQUENCY: FREQUENCY: CONTINUOUS

## 2024-05-30 ASSESSMENT — PAIN DESCRIPTION - DESCRIPTORS: DESCRIPTORS: DISCOMFORT

## 2024-05-30 ASSESSMENT — PAIN DESCRIPTION - LOCATION: LOCATION: ABDOMEN

## 2024-05-30 NOTE — DISCHARGE INSTR - COC
Continuity of Care Form    Patient Name: Rosemarie Tejeda   :  1980  MRN:  [id number]    Admit date:  2024  Discharge date:  ***    Code Status Order: Prior   Advance Directives:     Admitting Physician:  No admitting provider for patient encounter.  PCP: Sylvia Young MD    Discharging Nurse: ***  Discharging Hospital Unit/Room#:   Discharging Unit Phone Number: ***    Emergency Contact:   Extended Emergency Contact Information  Primary Emergency Contact: Deidra Mantilla  Home Phone: [phone]  Relation: Child    Past Surgical History:  Past Surgical History:   Procedure Laterality Date    DILATION AND CURETTAGE OF UTERUS      EXCISION / BIOPSY SKIN LESION OF LEG Left 2017    EXCISION MEDIAL THIGH LESION performed by Bryan Ovalles IV, DO at [facility] OR    HAMMER TOE SURGERY Right 2/3/2021    TOE HAMMER REPAIR 4th & 5th TOES performed by Sathya Mike DPM at [facility] OR    OTHER SURGICAL HISTORY      2 cone biopsys    PRE-MALIGNANT / BENIGN SKIN LESION EXCISION Left 2017    inner thigh     TONSILLECTOMY      adenoids       Immunization History:   Immunization History   Administered Date(s) Administered    DTaP 2009       Active Problems:  Patient Active Problem List   Diagnosis Code    Suicide attempt ([facility]) T14.91XA    Bipolar 1 disorder ([facility]) F31.9    Postnasal drip R09.82    Marijuana abuse F12.10    Obesity (BMI 30.0-34.9) E66.9    Closed nondisplaced oblique fracture of shaft of left ulna S52.235A    Closed nondisplaced oblique fracture of shaft of ulna with routine healing S52.236D    Closed fracture of lower end of right radius with routine healing S52.501D    Fibromyalgia M79.7    Depression with suicidal ideation F32.A, R45.851    Polysubstance abuse ([facility]) F19.10    Bipolar II disorder ([facility]) F31.81    Right sided abdominal pain R10.9    Constipation K59.00    Hammertoe of right foot M20.41    Pain and swelling of toe of right foot M79.674, M79.89    Anemia D64.9     {[facility]:[id number]}

## 2024-05-30 NOTE — DISCHARGE INSTRUCTIONS
You were seen due to an abscess on your abdomen.  We have prescribed you an antibiotic and we recommend that she take it for the entire course and do not stop early.  You may take Tylenol or Motrin as needed at home for pain.  You may take the Zofran as needed for nausea.    Please return to the ED if you develop worsening pain, fever, chills, nausea, vomiting, chest pain, shortness of breath, if wound is not healing or for lack of improvement within the next 2 to 3 days.  Please schedule follow-up appointment with your primary care physician as soon as you are able.

## 2024-05-30 NOTE — ED PROVIDER NOTES
[facility]     Emergency Department     Faculty Attestation    I performed a history and physical examination of the patient and discussed management with the resident. I have reviewed and agree with the resident’s findings including all diagnostic interpretations, and treatment plans as written at the time of my review. Any areas of disagreement are noted on the chart. I was personally present for the key portions of any procedures. I have documented in the chart those procedures where I was not present during the key portions. For Physician Assistant/ Nurse Practitioner cases/documentation I have personally evaluated this patient and have completed at least one if not all key elements of the E/M (history, physical exam, and MDM). Additional findings are as noted.    PtName: Rosemarie Tejeda  MRN: [id number]  Birthdate 1980  Date of evaluation: 5/30/24  Note Started: 10:49 AM EDT    Primary Care Physician: Sylvia Young MD        History: This is a 43 y.o. female who presents to the Emergency Department with complaint of skin infection.  Patient presents emerged [facility] complaining of pain and swelling in the right side of the abdomen.  Ongoing for the past several days.  Patient states he has expressed pus from the area.  She denies any fever.  Patient states he had a similar episode on the left side of the abdomen which after taking doxycycline resolved.  Patient states she does feel nauseated and fatigued.    Physical:   weight is 90 kg (198 lb 6.6 oz). Her oral temperature is 97.9 °F (36.6 °C). Her blood pressure is 131/82 and her pulse is 80. Her respiration is 18 and oxygen saturation is 100%.  There is approximately a 3 cm tender area in the right side of the abdominal wall with some surrounding erythema.    Impression: Abscess with cellulitis    Plan: Ultrasound to rule out any collection of pus that would need to be incised and drained.

## 2024-05-30 NOTE — ED NOTES
Pt to ED with c/o abscess to RLQ abd. Pt reports she was recently treated for abscess on middle of abd, took atbs, and it went away only for another abscess to form next. Pt c/o pain to RLQ abscess, redness/purulent drainage noted.

## 2024-05-30 NOTE — ED PROVIDER NOTES
[facility] ED  Emergency Department Encounter  Emergency Medicine Resident     Pt Name:Rosemarie Tejeda  MRN: [id number]  Birthdate 1980  Date of evaluation: 5/30/24  PCP:  Sylvia Young MD  Note Started: 11:37 AM EDT      CHIEF COMPLAINT       Chief Complaint   Patient presents with    Wound Infection       HISTORY OF PRESENT ILLNESS  (Location/Symptom, Timing/Onset, Context/Setting, Quality, Duration, Modifying Factors, Severity.)      Rosemarie Tejeda is a 43 y.o. female who presents with concern for skin infection.  Patient reports that she has history of MRSA and about 1 month ago had an abscess on the left side of her abdomen for which she was prescribed doxycycline and seems to have resolved.  She reports that she started having a small area of swelling and a pustule on the right side of her abdomen about 5 days ago and noted that she was able to get some pus out of the pustule yesterday but afterwards she notes that she started having severe pain in the abdomen near the site of the wound.  She reports it feels like it is burning and denies any medication prior to arrival for this pain.  She reports some nausea but denies vomiting.  She reports she feels ill and unwell and significantly fatigued.  She denies fevers or chills, shortness of breath.  She denies injecting anything into that site.    PAST MEDICAL / SURGICAL / SOCIAL / FAMILY HISTORY      has a past medical history of ADD (attention deficit disorder), ADHD (attention deficit hyperactivity disorder), Bipolar 1 disorder (HCC), Bronchitis, Constipation, Depression, Diarrhea, Difficulty swallowing, Dizziness, Fibromyalgia, Fibromyalgia, Hammertoe of right foot, Headache, HLD (hyperlipidemia), IBS (irritable bowel syndrome), MRSA (methicillin resistant staph aureus) culture positive, Nausea & vomiting, Nervously anxious, SOB (shortness of breath), Thyroid disorder, Wears glasses, and Weight gain.     has a past surgical history that

## 2024-06-02 LAB
MICROORGANISM SPEC CULT: NORMAL
SERVICE CMNT-IMP: NORMAL
SPECIMEN DESCRIPTION: NORMAL

## 2024-06-04 LAB
MICROORGANISM SPEC CULT: NORMAL
SERVICE CMNT-IMP: NORMAL
SPECIMEN DESCRIPTION: NORMAL

## 2025-04-25 ENCOUNTER — HOSPITAL ENCOUNTER (OUTPATIENT)
Dept: MAMMOGRAPHY | Age: 45
Discharge: HOME | End: 2025-04-27
Payer: COMMERCIAL

## 2025-04-25 DIAGNOSIS — Z12.31 ENCOUNTER FOR SCREENING MAMMOGRAM FOR MALIGNANT NEOPLASM OF BREAST: ICD-10-CM

## 2025-04-25 PROCEDURE — 77063 BREAST TOMOSYNTHESIS BI: CPT

## 2025-04-28 ENCOUNTER — OFFICE VISIT (OUTPATIENT)
Dept: PODIATRY | Age: 45
End: 2025-04-28
Payer: COMMERCIAL

## 2025-04-28 VITALS — HEIGHT: 66 IN | WEIGHT: 185 LBS | BODY MASS INDEX: 29.73 KG/M2

## 2025-04-28 DIAGNOSIS — M79.675 PAIN IN TOE OF LEFT FOOT: ICD-10-CM

## 2025-04-28 DIAGNOSIS — L60.0 ONYCHOCRYPTOSIS: ICD-10-CM

## 2025-04-28 DIAGNOSIS — S90.122A CONTUSION OF LESSER TOE OF LEFT FOOT WITHOUT DAMAGE TO NAIL, INITIAL ENCOUNTER: Primary | ICD-10-CM

## 2025-04-28 PROCEDURE — 99203 OFFICE O/P NEW LOW 30 MIN: CPT | Performed by: PODIATRIST

## 2025-04-28 PROCEDURE — 1036F TOBACCO NON-USER: CPT | Performed by: PODIATRIST

## 2025-04-28 PROCEDURE — G8427 DOCREV CUR MEDS BY ELIG CLIN: HCPCS | Performed by: PODIATRIST

## 2025-04-28 PROCEDURE — G8417 CALC BMI ABV UP PARAM F/U: HCPCS | Performed by: PODIATRIST

## 2025-04-28 ASSESSMENT — ENCOUNTER SYMPTOMS
COLOR CHANGE: 0
NAUSEA: 0
SHORTNESS OF BREATH: 0
DIARRHEA: 0
BACK PAIN: 0

## 2025-04-28 NOTE — PROGRESS NOTES
Rosemarie Tejeda is a 44 y.o. female who presents to the office today with chief complaint of pain and swelling to the left third toe.  Chief Complaint   Patient presents with    Toe Pain     Left third toe swollen for about a month  Dropped a compressor on second toe yesterday     Nail Problem     Left hallux nail pain    Symptoms began about 1 month(s) ago. Patient denies injury to the left third toe. Patient states that she thinks the left third toenail is ingrown. Pain is rated 7 out of 10 at it's worst and is described as intermittent. Treatments prior to today's visit include: None.  Patient also complains about ingrowth to the left big toenail.  Finally, patient complains of pain to the left second toe stating that she dropped a heavy object on this toe and it has been hurting ever since.      Allergies   Allergen Reactions    Neomycin-Bacitracin Zn-Polymyx     Sulfamethoxazole-Trimethoprim      dont remember RX       Past Medical History:   Diagnosis Date    ADD (attention deficit disorder)     ADHD (attention deficit hyperactivity disorder)     Bipolar 1 disorder (HCC)     Bronchitis     Constipation     Depression     Diarrhea     Difficulty swallowing     Dizziness     Fibromyalgia     Fibromyalgia     Hammertoe of right foot     Headache     HLD (hyperlipidemia)     IBS (irritable bowel syndrome)     MRSA (methicillin resistant staph aureus) culture positive     Nausea & vomiting     Nervously anxious     SOB (shortness of breath)     Thyroid disorder     Wears glasses     Weight gain        Prior to Admission medications    Medication Sig Start Date End Date Taking? Authorizing Provider   acetaminophen (TYLENOL) 500 MG tablet Take 2 tablets by mouth every 6 hours as needed for Pain 5/30/24  Yes Lorenza Nascimento MD   ibuprofen (ADVIL;MOTRIN) 800 MG tablet Take 1 tablet by mouth every 8 hours as needed for Pain 5/30/24  Yes Lorenza Nascimento MD   ondansetron (ZOFRAN-ODT) 4 MG disintegrating tablet Take 1

## 2025-05-06 ENCOUNTER — PROCEDURE VISIT (OUTPATIENT)
Dept: PODIATRY | Age: 45
End: 2025-05-06
Payer: COMMERCIAL

## 2025-05-06 VITALS — WEIGHT: 185 LBS | BODY MASS INDEX: 29.73 KG/M2 | HEIGHT: 66 IN

## 2025-05-06 DIAGNOSIS — L60.0 ONYCHOCRYPTOSIS: Primary | ICD-10-CM

## 2025-05-06 DIAGNOSIS — M79.675 PAIN IN TOE OF LEFT FOOT: ICD-10-CM

## 2025-05-06 PROCEDURE — 99213 OFFICE O/P EST LOW 20 MIN: CPT | Performed by: PODIATRIST

## 2025-05-06 PROCEDURE — 11750 EXCISION NAIL&NAIL MATRIX: CPT | Performed by: PODIATRIST

## 2025-05-06 PROCEDURE — 1036F TOBACCO NON-USER: CPT | Performed by: PODIATRIST

## 2025-05-06 PROCEDURE — G8417 CALC BMI ABV UP PARAM F/U: HCPCS | Performed by: PODIATRIST

## 2025-05-06 PROCEDURE — G8427 DOCREV CUR MEDS BY ELIG CLIN: HCPCS | Performed by: PODIATRIST

## 2025-05-06 NOTE — PROGRESS NOTES
[facility]  Return Patient Progress Note    Subjective: Rosemarie Tejeda 44 y.o. female that presents for follow up evaluation of ingrown nails to the left 1st and 3rd toes.  Chief Complaint   Patient presents with    Procedure     Left hallux and 3rd toenail     Patient is requesting removal of the nail borders to these toes.  Pain is rated 9 out of 10 and is described as intermittent.    Review of Systems   Constitutional:  Negative for activity change, appetite change, chills, diaphoresis, fatigue and fever.   Respiratory:  Negative for shortness of breath.    Cardiovascular:  Negative for leg swelling.   Gastrointestinal:  Negative for diarrhea and nausea.   Endocrine: Negative for cold intolerance, heat intolerance and polyuria.   Musculoskeletal:  Negative for arthralgias, back pain, gait problem, joint swelling and myalgias.   Skin:  Negative for color change, pallor, rash and wound.   Allergic/Immunologic: Negative for environmental allergies and food allergies.   Neurological:  Negative for dizziness, weakness, light-headedness and numbness.   Hematological:  Does not bruise/bleed easily.   Psychiatric/Behavioral:  Negative for behavioral problems, confusion and self-injury. The patient is not nervous/anxious.        Objective: Clinical evaluation of the patient reveals ingrowth to the bilateral borders of the left hallux nail plate and to the bilateral borders of the left third toenail. There is edema noted to each border of these toes. No erythema, calor, drainage, or malodor is noted to either toe. There is pain with palpation to both borders of the left hallux and both borders of the left third toe.     Assessment:    Diagnosis Orders   1. Onychocryptosis  REMOVAL OF NAIL MATRIX      2. Pain in toe of left foot  REMOVAL OF NAIL MATRIX            Plan: 1. Clinical evaluation of the patient. 2.  I recommended a partial nail avulsion with chemical matricectomy to the medial & lateral borders of

## 2025-05-07 ASSESSMENT — ENCOUNTER SYMPTOMS
DIARRHEA: 0
SHORTNESS OF BREATH: 0
BACK PAIN: 0
NAUSEA: 0
COLOR CHANGE: 0

## 2025-05-20 ENCOUNTER — OFFICE VISIT (OUTPATIENT)
Dept: PODIATRY | Age: 45
End: 2025-05-20
Payer: COMMERCIAL

## 2025-05-20 VITALS — BODY MASS INDEX: 29.73 KG/M2 | WEIGHT: 185 LBS | HEIGHT: 66 IN

## 2025-05-20 DIAGNOSIS — L03.032 PARONYCHIA OF GREAT TOE OF LEFT FOOT: Primary | ICD-10-CM

## 2025-05-20 DIAGNOSIS — M79.675 PAIN IN TOE OF LEFT FOOT: ICD-10-CM

## 2025-05-20 DIAGNOSIS — L60.0 ONYCHOCRYPTOSIS: ICD-10-CM

## 2025-05-20 PROCEDURE — G8417 CALC BMI ABV UP PARAM F/U: HCPCS | Performed by: PODIATRIST

## 2025-05-20 PROCEDURE — G8427 DOCREV CUR MEDS BY ELIG CLIN: HCPCS | Performed by: PODIATRIST

## 2025-05-20 PROCEDURE — 1036F TOBACCO NON-USER: CPT | Performed by: PODIATRIST

## 2025-05-20 PROCEDURE — 99213 OFFICE O/P EST LOW 20 MIN: CPT | Performed by: PODIATRIST

## 2025-05-20 RX ORDER — DOXYCYCLINE 100 MG/1
100 CAPSULE ORAL 2 TIMES DAILY
Qty: 28 CAPSULE | Refills: 0 | Status: SHIPPED | OUTPATIENT
Start: 2025-05-20 | End: 2025-06-03

## 2025-05-20 NOTE — PROGRESS NOTES
[facility]  Return Patient Progress Note    Subjective: Rosemarie Tejeda 44 y.o. female that presents for follow up evaluation of removal of ingrown nails from the left 1st and 3rd toes.  Chief Complaint   Patient presents with    Post-Op Check     Post op in office nail surgery     Patient's treatment thus far has included daily dressing changes with antibiotic ointment and Band-Aids.  Pain is rated 5 out of 10 and is described as intermittent. Patient has been following my prescribed course of therapy as instructed.     Review of Systems   Constitutional:  Negative for activity change, appetite change, chills, diaphoresis, fatigue and fever.   Respiratory:  Negative for shortness of breath.    Cardiovascular:  Negative for leg swelling.   Gastrointestinal:  Negative for diarrhea and nausea.   Endocrine: Negative for cold intolerance, heat intolerance and polyuria.   Musculoskeletal:  Negative for arthralgias, back pain, gait problem, joint swelling and myalgias.   Skin:  Positive for wound. Negative for color change, pallor and rash.   Allergic/Immunologic: Negative for environmental allergies and food allergies.   Neurological:  Negative for dizziness, weakness, light-headedness and numbness.   Hematological:  Does not bruise/bleed easily.   Psychiatric/Behavioral:  Negative for behavioral problems, confusion and self-injury. The patient is not nervous/anxious.        Objective: Clinical evaluation of the patient reveals absence to both Medial and Lateral border of the left hallux and third toe nail plates. There is erythema and edema remaining to the left hallux and third toe.  Erythema is great to the left hallux.  There is loose eschar noted to both Medial and Lateral border of the left hallux and third toe.  This eschar is easily removed with a sterile gauze. The nail bed to both toes is granular. There is no drainage or malodor noted to the left hallux or third toe. There is mild pain with palpation

## 2025-05-22 ASSESSMENT — ENCOUNTER SYMPTOMS
NAUSEA: 0
BACK PAIN: 0
COLOR CHANGE: 0
DIARRHEA: 0
SHORTNESS OF BREATH: 0

## 2025-08-20 ENCOUNTER — HOSPITAL ENCOUNTER (OUTPATIENT)
Age: 45
Setting detail: SPECIMEN
Discharge: HOME | End: 2025-08-20

## 2025-08-20 ENCOUNTER — OFFICE VISIT (OUTPATIENT)
Dept: OBGYN CLINIC | Age: 45
End: 2025-08-20
Payer: COMMERCIAL

## 2025-08-20 VITALS
HEIGHT: 66 IN | BODY MASS INDEX: 30.7 KG/M2 | WEIGHT: 191 LBS | DIASTOLIC BLOOD PRESSURE: 64 MMHG | SYSTOLIC BLOOD PRESSURE: 100 MMHG

## 2025-08-20 DIAGNOSIS — N89.8 VAGINAL DISCHARGE: ICD-10-CM

## 2025-08-20 DIAGNOSIS — Z12.31 ENCOUNTER FOR SCREENING MAMMOGRAM FOR MALIGNANT NEOPLASM OF BREAST: ICD-10-CM

## 2025-08-20 DIAGNOSIS — Z11.51 SPECIAL SCREENING EXAMINATION FOR HUMAN PAPILLOMAVIRUS (HPV): ICD-10-CM

## 2025-08-20 DIAGNOSIS — Z11.3 SCREEN FOR STD (SEXUALLY TRANSMITTED DISEASE): ICD-10-CM

## 2025-08-20 DIAGNOSIS — R68.82 DECREASED LIBIDO: ICD-10-CM

## 2025-08-20 DIAGNOSIS — Z80.3 FAMILY HX-BREAST MALIGNANCY: ICD-10-CM

## 2025-08-20 DIAGNOSIS — Z80.3 FAMILY HISTORY OF BREAST CANCER: Primary | ICD-10-CM

## 2025-08-20 DIAGNOSIS — Z01.419 WELL WOMAN EXAM WITH ROUTINE GYNECOLOGICAL EXAM: Primary | ICD-10-CM

## 2025-08-20 DIAGNOSIS — R61 NIGHT SWEATS: ICD-10-CM

## 2025-08-20 PROCEDURE — 99213 OFFICE O/P EST LOW 20 MIN: CPT | Performed by: NURSE PRACTITIONER

## 2025-08-20 PROCEDURE — G8427 DOCREV CUR MEDS BY ELIG CLIN: HCPCS | Performed by: NURSE PRACTITIONER

## 2025-08-20 PROCEDURE — 1036F TOBACCO NON-USER: CPT | Performed by: NURSE PRACTITIONER

## 2025-08-20 PROCEDURE — 99396 PREV VISIT EST AGE 40-64: CPT | Performed by: NURSE PRACTITIONER

## 2025-08-20 PROCEDURE — G8417 CALC BMI ABV UP PARAM F/U: HCPCS | Performed by: NURSE PRACTITIONER

## 2025-08-21 ENCOUNTER — RESULTS FOLLOW-UP (OUTPATIENT)
Dept: OBGYN CLINIC | Age: 45
End: 2025-08-21

## 2025-08-22 LAB
HPV I/H RISK 4 DNA CVX QL NAA+PROBE: NOT DETECTED
HPV SAMPLE: NORMAL
HPV, INTERPRETATION: NORMAL
HPV16 DNA CVX QL NAA+PROBE: NOT DETECTED
HPV18 DNA CVX QL NAA+PROBE: NOT DETECTED
SPECIMEN DESCRIPTION: NORMAL

## 2025-08-28 ENCOUNTER — INITIAL CONSULT (OUTPATIENT)
Age: 45
End: 2025-08-28
Payer: COMMERCIAL

## 2025-08-28 DIAGNOSIS — Z80.3 FAMILY HISTORY OF BREAST CANCER: Primary | ICD-10-CM

## 2025-08-28 PROCEDURE — 96041 GENETIC COUNSELING SVC EA 30: CPT | Performed by: GENETIC COUNSELOR, MS

## 2025-09-03 LAB — CYTOLOGY REPORT: NORMAL

## (undated) DEVICE — STERILE POLYISOPRENE POWDER-FREE SURGICAL GLOVES WITH EMOLLIENT COATING: Brand: PROTEXIS

## (undated) DEVICE — SVMMC PEDS/UROLOGY MINOR PACK: Brand: MEDLINE INDUSTRIES, INC.

## (undated) DEVICE — SUTURE MCRYL SZ 4-0 L18IN ABSRB UD L16MM PC-3 3/8 CIR PRIM Y845G

## (undated) DEVICE — PAD N ADH W3XL4IN POLY COT SFT PERF FLM EASILY CUT ABSRB

## (undated) DEVICE — GLOVE SURG SZ 85 L12IN FNGR ORTHO 126MIL CRM LTX FREE

## (undated) DEVICE — MERCY HEALTH ST CHARLES: Brand: MEDLINE INDUSTRIES, INC.

## (undated) DEVICE — DRESSING PETRO W3XL3IN OIL EMUL N ADH GZ KNIT IMPREG CELOS

## (undated) DEVICE — SUTURE VCRL SZ 3-0 L27IN ABSRB UD L26MM SH 1/2 CIR J416H

## (undated) DEVICE — SKIN AFFIX SURG ADHESIVE 72/CS 0.55ML: Brand: MEDLINE

## (undated) DEVICE — ZIMMER® STERILE DISPOSABLE TOURNIQUET CUFF WITH PLC, DUAL PORT, SINGLE BLADDER, 18 IN. (46 CM)

## (undated) DEVICE — GLOVE ORANGE PI 8 1/2   MSG9085

## (undated) DEVICE — BNDG,ELSTC,MATRIX,STRL,4"X5YD,LF,HOOK&LP: Brand: MEDLINE

## (undated) DEVICE — GOWN,SIRUS,NONRNF,SETINSLV,XL,20/CS: Brand: MEDLINE

## (undated) DEVICE — Device

## (undated) DEVICE — 12FR FRAZIER SUCTION HANDLE: Brand: CARDINAL HEALTH

## (undated) DEVICE — CHLORAPREP 26ML ORANGE

## (undated) DEVICE — SMALL OSC. SAW BLADE, 5.5MM X 11.5MM X 0.38MM: Brand: MICROAIRE®

## (undated) DEVICE — SOLUTION IV IRRIG POUR BRL 0.9% SODIUM CHL 2F7124

## (undated) DEVICE — TOWEL SURG W16XL26IN WHT NONFENESTRATED ST 4 PER PK

## (undated) DEVICE — BANDAGE,GAUZE,BULKEE II,4.5"X4.1YD,STRL: Brand: MEDLINE

## (undated) DEVICE — SUTURE ETHLN SZ 4-0 L18IN NONABSORBABLE BLK L24MM FS-1 3/8 1629H

## (undated) DEVICE — SINGLE PORT MANIFOLD: Brand: NEPTUNE 2